# Patient Record
Sex: FEMALE | Race: WHITE | NOT HISPANIC OR LATINO | Employment: FULL TIME | URBAN - METROPOLITAN AREA
[De-identification: names, ages, dates, MRNs, and addresses within clinical notes are randomized per-mention and may not be internally consistent; named-entity substitution may affect disease eponyms.]

---

## 2018-02-24 PROBLEM — S82.842A CLOSED BIMALLEOLAR FRACTURE OF LEFT ANKLE: Status: ACTIVE | Noted: 2018-02-24

## 2018-02-25 PROBLEM — Q85.00 NEUROFIBROMATOSIS: Chronic | Status: ACTIVE | Noted: 2018-02-25

## 2018-02-25 PROBLEM — J45.909 ASTHMA: Chronic | Status: ACTIVE | Noted: 2018-02-25

## 2018-02-25 PROBLEM — S93.05XA ANKLE DISLOCATION, LEFT, INITIAL ENCOUNTER: Status: ACTIVE | Noted: 2018-02-25

## 2024-05-17 PROBLEM — B00.1: Status: ACTIVE | Noted: 2024-05-17

## 2024-05-17 PROBLEM — B00.50: Status: ACTIVE | Noted: 2024-05-17

## 2024-05-18 ENCOUNTER — HOSPITAL ENCOUNTER (INPATIENT)
Facility: HOSPITAL | Age: 69
LOS: 11 days | Discharge: NURSING, REHAB OR LONG-TERM CARE | DRG: 125 | End: 2024-05-29
Attending: STUDENT IN AN ORGANIZED HEALTH CARE EDUCATION/TRAINING PROGRAM | Admitting: HOSPITALIST
Payer: MEDICARE

## 2024-05-18 DIAGNOSIS — R07.9 CHEST PAIN: ICD-10-CM

## 2024-05-18 DIAGNOSIS — B02.30 HERPES ZOSTER OPHTHALMICUS OF LEFT EYE: ICD-10-CM

## 2024-05-18 DIAGNOSIS — E55.9 VITAMIN D DEFICIENCY: Primary | ICD-10-CM

## 2024-05-18 DIAGNOSIS — R78.81 BACTEREMIA: ICD-10-CM

## 2024-05-18 PROBLEM — F41.9 ANXIETY: Status: ACTIVE | Noted: 2024-05-18

## 2024-05-18 LAB
ALBUMIN SERPL BCP-MCNC: 3.1 G/DL (ref 3.5–5.2)
ALP SERPL-CCNC: 92 U/L (ref 55–135)
ALT SERPL W/O P-5'-P-CCNC: 7 U/L (ref 10–44)
ANION GAP SERPL CALC-SCNC: 12 MMOL/L (ref 8–16)
AST SERPL-CCNC: 12 U/L (ref 10–40)
BASOPHILS # BLD AUTO: 0.06 K/UL (ref 0–0.2)
BASOPHILS NFR BLD: 0.6 % (ref 0–1.9)
BILIRUB SERPL-MCNC: 0.5 MG/DL (ref 0.1–1)
BUN SERPL-MCNC: 11 MG/DL (ref 8–23)
CALCIUM SERPL-MCNC: 9.5 MG/DL (ref 8.7–10.5)
CHLORIDE SERPL-SCNC: 100 MMOL/L (ref 95–110)
CO2 SERPL-SCNC: 23 MMOL/L (ref 23–29)
CREAT SERPL-MCNC: 0.7 MG/DL (ref 0.5–1.4)
DIFFERENTIAL METHOD BLD: ABNORMAL
EOSINOPHIL # BLD AUTO: 0.1 K/UL (ref 0–0.5)
EOSINOPHIL NFR BLD: 0.6 % (ref 0–8)
ERYTHROCYTE [DISTWIDTH] IN BLOOD BY AUTOMATED COUNT: 14.3 % (ref 11.5–14.5)
EST. GFR  (NO RACE VARIABLE): >60 ML/MIN/1.73 M^2
GLUCOSE SERPL-MCNC: 104 MG/DL (ref 70–110)
HCT VFR BLD AUTO: 38.1 % (ref 37–48.5)
HGB BLD-MCNC: 12.4 G/DL (ref 12–16)
IMM GRANULOCYTES # BLD AUTO: 0.06 K/UL (ref 0–0.04)
IMM GRANULOCYTES NFR BLD AUTO: 0.6 % (ref 0–0.5)
LYMPHOCYTES # BLD AUTO: 0.8 K/UL (ref 1–4.8)
LYMPHOCYTES NFR BLD: 7 % (ref 18–48)
MAGNESIUM SERPL-MCNC: 1.9 MG/DL (ref 1.6–2.6)
MCH RBC QN AUTO: 29.3 PG (ref 27–31)
MCHC RBC AUTO-ENTMCNC: 32.5 G/DL (ref 32–36)
MCV RBC AUTO: 90 FL (ref 82–98)
MONOCYTES # BLD AUTO: 1.2 K/UL (ref 0.3–1)
MONOCYTES NFR BLD: 11.5 % (ref 4–15)
NEUTROPHILS # BLD AUTO: 8.6 K/UL (ref 1.8–7.7)
NEUTROPHILS NFR BLD: 79.7 % (ref 38–73)
NRBC BLD-RTO: 0 /100 WBC
PHOSPHATE SERPL-MCNC: 3 MG/DL (ref 2.7–4.5)
PLATELET # BLD AUTO: 320 K/UL (ref 150–450)
PMV BLD AUTO: 10.7 FL (ref 9.2–12.9)
POTASSIUM SERPL-SCNC: 3.7 MMOL/L (ref 3.5–5.1)
PROT SERPL-MCNC: 6.4 G/DL (ref 6–8.4)
RBC # BLD AUTO: 4.23 M/UL (ref 4–5.4)
SODIUM SERPL-SCNC: 135 MMOL/L (ref 136–145)
WBC # BLD AUTO: 10.79 K/UL (ref 3.9–12.7)

## 2024-05-18 PROCEDURE — 25000003 PHARM REV CODE 250

## 2024-05-18 PROCEDURE — 84100 ASSAY OF PHOSPHORUS: CPT | Performed by: STUDENT IN AN ORGANIZED HEALTH CARE EDUCATION/TRAINING PROGRAM

## 2024-05-18 PROCEDURE — 63600175 PHARM REV CODE 636 W HCPCS

## 2024-05-18 PROCEDURE — 80053 COMPREHEN METABOLIC PANEL: CPT | Mod: 91 | Performed by: STUDENT IN AN ORGANIZED HEALTH CARE EDUCATION/TRAINING PROGRAM

## 2024-05-18 PROCEDURE — 63600175 PHARM REV CODE 636 W HCPCS: Performed by: STUDENT IN AN ORGANIZED HEALTH CARE EDUCATION/TRAINING PROGRAM

## 2024-05-18 PROCEDURE — 25000003 PHARM REV CODE 250: Performed by: STUDENT IN AN ORGANIZED HEALTH CARE EDUCATION/TRAINING PROGRAM

## 2024-05-18 PROCEDURE — 36415 COLL VENOUS BLD VENIPUNCTURE: CPT | Performed by: STUDENT IN AN ORGANIZED HEALTH CARE EDUCATION/TRAINING PROGRAM

## 2024-05-18 PROCEDURE — 27000207 HC ISOLATION

## 2024-05-18 PROCEDURE — 11000001 HC ACUTE MED/SURG PRIVATE ROOM

## 2024-05-18 PROCEDURE — 85025 COMPLETE CBC W/AUTO DIFF WBC: CPT | Mod: 91 | Performed by: STUDENT IN AN ORGANIZED HEALTH CARE EDUCATION/TRAINING PROGRAM

## 2024-05-18 PROCEDURE — 83735 ASSAY OF MAGNESIUM: CPT | Mod: 91 | Performed by: STUDENT IN AN ORGANIZED HEALTH CARE EDUCATION/TRAINING PROGRAM

## 2024-05-18 RX ORDER — NALOXONE HCL 0.4 MG/ML
0.02 VIAL (ML) INJECTION
Status: DISCONTINUED | OUTPATIENT
Start: 2024-05-18 | End: 2024-05-29 | Stop reason: HOSPADM

## 2024-05-18 RX ORDER — IBUPROFEN 200 MG
24 TABLET ORAL
Status: DISCONTINUED | OUTPATIENT
Start: 2024-05-18 | End: 2024-05-29 | Stop reason: HOSPADM

## 2024-05-18 RX ORDER — CARBOXYMETHYLCELLULOSE SODIUM 10 MG/ML
1 GEL OPHTHALMIC 4 TIMES DAILY
Status: DISCONTINUED | OUTPATIENT
Start: 2024-05-18 | End: 2024-05-29 | Stop reason: HOSPADM

## 2024-05-18 RX ORDER — IBUPROFEN 200 MG
16 TABLET ORAL
Status: DISCONTINUED | OUTPATIENT
Start: 2024-05-18 | End: 2024-05-29 | Stop reason: HOSPADM

## 2024-05-18 RX ORDER — MORPHINE SULFATE 4 MG/ML
3 INJECTION, SOLUTION INTRAMUSCULAR; INTRAVENOUS ONCE
Status: COMPLETED | OUTPATIENT
Start: 2024-05-18 | End: 2024-05-18

## 2024-05-18 RX ORDER — GABAPENTIN 100 MG/1
100 CAPSULE ORAL 3 TIMES DAILY
Status: DISCONTINUED | OUTPATIENT
Start: 2024-05-18 | End: 2024-05-20

## 2024-05-18 RX ORDER — ATROPINE SULFATE 10 MG/ML
1 SOLUTION/ DROPS OPHTHALMIC DAILY
Status: DISCONTINUED | OUTPATIENT
Start: 2024-05-19 | End: 2024-05-29 | Stop reason: HOSPADM

## 2024-05-18 RX ORDER — ERYTHROMYCIN 5 MG/G
OINTMENT OPHTHALMIC 4 TIMES DAILY
Status: DISCONTINUED | OUTPATIENT
Start: 2024-05-18 | End: 2024-05-29 | Stop reason: HOSPADM

## 2024-05-18 RX ORDER — ONDANSETRON HYDROCHLORIDE 2 MG/ML
4 INJECTION, SOLUTION INTRAVENOUS EVERY 6 HOURS PRN
Status: DISCONTINUED | OUTPATIENT
Start: 2024-05-18 | End: 2024-05-29 | Stop reason: HOSPADM

## 2024-05-18 RX ORDER — ACETAMINOPHEN 325 MG/1
650 TABLET ORAL EVERY 6 HOURS PRN
Status: DISCONTINUED | OUTPATIENT
Start: 2024-05-18 | End: 2024-05-29 | Stop reason: HOSPADM

## 2024-05-18 RX ORDER — LORAZEPAM 0.5 MG/1
1 TABLET ORAL NIGHTLY PRN
Status: DISCONTINUED | OUTPATIENT
Start: 2024-05-18 | End: 2024-05-29 | Stop reason: HOSPADM

## 2024-05-18 RX ORDER — PREDNISOLONE ACETATE 10 MG/ML
1 SUSPENSION/ DROPS OPHTHALMIC 2 TIMES DAILY
Status: DISCONTINUED | OUTPATIENT
Start: 2024-05-18 | End: 2024-05-22

## 2024-05-18 RX ORDER — FLUTICASONE FUROATE AND VILANTEROL 100; 25 UG/1; UG/1
1 POWDER RESPIRATORY (INHALATION) DAILY
Status: DISCONTINUED | OUTPATIENT
Start: 2024-05-19 | End: 2024-05-29 | Stop reason: HOSPADM

## 2024-05-18 RX ORDER — SODIUM CHLORIDE 0.9 % (FLUSH) 0.9 %
10 SYRINGE (ML) INJECTION EVERY 12 HOURS PRN
Status: DISCONTINUED | OUTPATIENT
Start: 2024-05-18 | End: 2024-05-29 | Stop reason: HOSPADM

## 2024-05-18 RX ORDER — GLUCAGON 1 MG
1 KIT INJECTION
Status: DISCONTINUED | OUTPATIENT
Start: 2024-05-18 | End: 2024-05-29 | Stop reason: HOSPADM

## 2024-05-18 RX ORDER — OXYCODONE HYDROCHLORIDE 5 MG/1
5 TABLET ORAL EVERY 4 HOURS PRN
Status: DISCONTINUED | OUTPATIENT
Start: 2024-05-18 | End: 2024-05-29 | Stop reason: HOSPADM

## 2024-05-18 RX ADMIN — CARBOXYMETHYLCELLULOSE SODIUM 1 DROP: 10 GEL OPHTHALMIC at 06:05

## 2024-05-18 RX ADMIN — CARBOXYMETHYLCELLULOSE SODIUM 1 DROP: 10 GEL OPHTHALMIC at 08:05

## 2024-05-18 RX ADMIN — LORAZEPAM 1 MG: 0.5 TABLET ORAL at 08:05

## 2024-05-18 RX ADMIN — OXYCODONE 5 MG: 5 TABLET ORAL at 03:05

## 2024-05-18 RX ADMIN — ONDANSETRON 4 MG: 2 INJECTION INTRAMUSCULAR; INTRAVENOUS at 06:05

## 2024-05-18 RX ADMIN — ACYCLOVIR SODIUM 480 MG: 50 INJECTION, SOLUTION INTRAVENOUS at 04:05

## 2024-05-18 RX ADMIN — ERYTHROMYCIN: 5 OINTMENT OPHTHALMIC at 08:05

## 2024-05-18 RX ADMIN — GABAPENTIN 100 MG: 100 CAPSULE ORAL at 08:05

## 2024-05-18 RX ADMIN — MORPHINE SULFATE 3 MG: 4 INJECTION INTRAVENOUS at 04:05

## 2024-05-18 RX ADMIN — OXYCODONE 5 MG: 5 TABLET ORAL at 08:05

## 2024-05-18 RX ADMIN — ACETAMINOPHEN 650 MG: 325 TABLET ORAL at 08:05

## 2024-05-18 RX ADMIN — ERYTHROMYCIN: 5 OINTMENT OPHTHALMIC at 06:05

## 2024-05-18 NOTE — H&P
[facility] Surg ([address] Medicine  History & Physical    Patient Name: Jessi Dial  MRN: [id number]  Patient Class: IP- Inpatient  Admission Date: 5/18/2024  Attending Physician: Ed Jolly MD   Primary Care Provider: Oscar Shafer MD         Patient information was obtained from patient, past medical records, and ER records.     Subjective:     Principal Problem:Herpes zoster ophthalmicus of left eye    Chief Complaint: No chief complaint on file.       HPI: Jessi Dial onesimo  67 yo W w/ PMH of asthma, COPD, seizures, and neurofibromatosis who presented to [facility] ED for persistent pain tot he left side of her face and was transferred to [facility] for inpatient ophthalmology evaluation. She reports that about 5 weeks ago she started having pain b/l below her breasts that later developed into an itchy and painful foul-smelling rash. This has been improving. She presented initially to the ED on 04/23 for L-sided headache and ear pain with N/V and was treated and discharged. She then presented to [facility] ED on 04/26 for a rash with an associated burning sensation that covered the L forehead and upper eyelid. The ED provider noted that she had no Pemberton sign and fluorescein staining of the L eye showed no dendritic lesions. She was discharged with valacyclovir and prednisolone eye drops but presented to the ED again yesterday as the pain had continued to persist despite the medications. She was followed outpatient by her PCP and an ophthalmologist. The vesicular lesions have begun to crust over but she is continuing to have significant pain over her L eye and struggles to keep it open. She reports that she was started on gabapentin outpatient for additional pain control and that it initially made her drowsy and confused but that those symptoms resolved as she continued taking it. Reports vision changes due to eye discharge and pain. Denies fever, chills, hearing  changes, abdominal pain, shortness of breath, cough, chest pain.    In the OSH ED, she was hypertensive and tachycardic, afebrile. Labs notable for leukocytosis to 13.11 and Na 134. CTH without evidence of acute intracranial abnormality but notable for numerous scalp neurofibromas. Received a dose of IV acyclovir. Transferred here for further evaluation and management.     Past Medical History:   Diagnosis Date    Asthma     COPD (chronic obstructive pulmonary disease)     Neurofibromatosis     Seizures        No past surgical history on file.    Review of patient's allergies indicates:   Allergen Reactions    Avelox [moxifloxacin] Swelling       Current Facility-Administered Medications on File Prior to Encounter   Medication    [COMPLETED] acyclovir 500 mg in dextrose 5 % (D5W) 100 mL IVPB    [COMPLETED] morphine injection 4 mg    [COMPLETED] morphine injection 4 mg    [COMPLETED] ondansetron injection 4 mg    [COMPLETED] ondansetron injection 4 mg    [DISCONTINUED] 0.9%  NaCl infusion    [DISCONTINUED] acetaminophen tablet 650 mg    [DISCONTINUED] acyclovir 500 mg in dextrose 5 % (D5W) 100 mL IVPB    [DISCONTINUED] morphine injection 4 mg    [DISCONTINUED] ondansetron injection 4 mg    [DISCONTINUED] prochlorperazine injection Soln 5 mg    [DISCONTINUED] senna-docusate 8.6-50 mg per tablet 1 tablet     Current Outpatient Medications on File Prior to Encounter   Medication Sig    acetaminophen (TYLENOL) 325 MG tablet Take 2 tablets (650 mg total) by mouth every 4 (four) hours as needed.    albuterol 90 mcg/actuation inhaler Inhale 1-2 puffs into the lungs every 6 (six) hours as needed for Wheezing. Rescue    aspirin 325 MG tablet Take 1 tablet (325 mg total) by mouth 2 (two) times daily.    budesonide-formoterol 80-4.5 mcg (SYMBICORT) 80-4.5 mcg/actuation HFAA Inhale 2 puffs into the lungs 2 (two) times daily. Controller    butalbital-acetaminophen-caffeine -40 mg (FIORICET, ESGIC) -40 mg per tablet  Take 1 tablet by mouth every 4 (four) hours as needed for Pain.    fluticasone propionate (FLONASE) 50 mcg/actuation nasal spray 1 spray (50 mcg total) by Each Nostril route 2 (two) times daily as needed for Rhinitis.    levocetirizine (XYZAL) 5 MG tablet Take 1 tablet (5 mg total) by mouth every evening.    LORazepam (ATIVAN) 1 MG tablet Take 1 mg by mouth nightly as needed for Anxiety.    naproxen (NAPROSYN) 500 MG tablet Take 1 tablet (500 mg total) by mouth 2 (two) times daily with meals.    oxyCODONE (ROXICODONE) 5 MG immediate release tablet Take 1 tablet (5 mg total) by mouth every 4 (four) hours as needed for Pain.    senna-docusate 8.6-50 mg (PERICOLACE) 8.6-50 mg per tablet Take 1 tablet by mouth 2 (two) times daily.    valACYclovir (VALTREX) 1000 MG tablet Take 1 tablet (1,000 mg total) by mouth 3 (three) times daily. for 10 days    [DISCONTINUED] azithromycin (Z-VIVIENNE) 250 MG tablet Take 1 tablet (250 mg total) by mouth once daily. Take first 2 tablets together, then 1 every day until finished.     Family History    None       Tobacco Use    Smoking status: Never    Smokeless tobacco: Never   Substance and Sexual Activity    Alcohol use: No    Drug use: No    Sexual activity: Not on file     Review of Systems   Constitutional:  Negative for chills and fever.   HENT:  Negative for ear discharge and ear pain.    Eyes:  Positive for photophobia, pain, discharge and visual disturbance.   Respiratory:  Negative for cough and shortness of breath.    Cardiovascular:  Negative for chest pain.   Gastrointestinal:  Positive for nausea and vomiting. Negative for abdominal pain.   Musculoskeletal:  Negative for arthralgias and myalgias.   Skin:  Positive for rash.   Neurological:  Positive for dizziness and light-headedness.     Objective:     Vital Signs (Most Recent):  Temp: 98.3 °F (36.8 °C) (05/18/24 1419)  Pulse: 84 (05/18/24 1419)  Resp: 19 (05/18/24 1615)  BP: (!) 166/79 (05/18/24 1419)  SpO2: 98 % (05/18/24  1419) Vital Signs (24h Range):  Temp:  [97.9 °F (36.6 °C)-98.3 °F (36.8 °C)] 98.3 °F (36.8 °C)  Pulse:  [84-91] 84  Resp:  [18-20] 19  SpO2:  [96 %-98 %] 98 %  BP: (119-166)/(61-79) 166/79     Weight: 68.9 kg (151 lb 14.4 oz)  Body mass index is 28.7 kg/m².     Physical Exam  Vitals and nursing note reviewed.   Constitutional:       General: She is not in acute distress.     Appearance: She is not ill-appearing.   HENT:      Head: Normocephalic and atraumatic.   Eyes:      Extraocular Movements: Extraocular movements intact.      Comments: Crusted vesicular lesions along the L V1 dermatome with lesions extending onto the L eyelid, neurofibromas present on eyelids   Cardiovascular:      Rate and Rhythm: Normal rate and regular rhythm.      Heart sounds: Normal heart sounds.   Pulmonary:      Effort: Pulmonary effort is normal. No respiratory distress.      Breath sounds: Normal breath sounds.   Musculoskeletal:      Right lower leg: No edema.      Left lower leg: No edema.   Skin:     General: Skin is warm and dry.      Comments:   Diffuse neurofibromas  Erythematous skins and healing lesions below both breasts, no obvious vesicular lesions   Neurological:      General: No focal deficit present.      Mental Status: She is alert and oriented to person, place, and time.   Psychiatric:         Mood and Affect: Mood normal.         Behavior: Behavior normal.                Significant Labs: All pertinent labs within the past 24 hours have been reviewed.    Significant Imaging: I have reviewed all pertinent imaging results/findings within the past 24 hours.  Assessment/Plan:     * Herpes zoster ophthalmicus of left eye  3-4 week history of vesicular lesions and pain extending along the L V1 dermatome including the L eyelid. She reports L eye discharge and pain that impacts her vision. She was seen in the ED on 04/26 for this and followed outpatient by her PCP and ophthalmologist. Was taking Valtrex and using steroid eye  drops, but the pain and rash persisted. Transferred here for inpatient ophthalmology evaluation. Vision impaired by discharge and presence of rash and neurofibromas on L eyelid, but otherwise no vision loss.     - Ophthalmology consulted, appreciate recommendations  - IV acyclovir 10mg/kg q8h  - Gabapentin 100mg TID, Tylenol and oxy 5mg prn for pain    Anxiety  Continuing home ativan      Neurofibromatosis  Hx of neurofibromatosis      Asthma  Continue home inhaler        VTE Risk Mitigation (From admission, onward)           Ordered     IP VTE LOW RISK PATIENT  Once         05/18/24 1505     Place sequential compression device  Until discontinued         05/18/24 1505                                    Duyen Joy MD  Department of Hospital Medicine  [facility] - Med Surg ([id number])

## 2024-05-18 NOTE — CONSULTS
Reason for consult: Herpes zoster of L V1 dermatome     CC: blurry vision, new floaters intermittently for 1 month    HPI: Jessi Dial is a 68 y.o. female who has been having left sided facial rash and light sensitivity for 4 weeks. She has been treated as an outpatient by her PCP and ophthalmologist. She was previously on steroid drops and was taken off of them, but presented to her ophthalmologist yesterday 05/17 and was told to restart steroid drops BID. She also stated that she did have lesions on her eye that went away (per her eye doctor). She also states she was taking valtrex which had been decreased to BID from TID. She endorses difficulty opening eyelids but denies gross visual changes when eyes are open. Endorsing light sensitivity and scratchiness.       POH: CEIOL OU, plus above     Gtts: most recently pred forte BID, left eye      Past Medical History:   Diagnosis Date    Asthma     COPD (chronic obstructive pulmonary disease)     Neurofibromatosis     Seizures          No family history on file.        Current Facility-Administered Medications:     acetaminophen tablet 650 mg, 650 mg, Oral, Q6H PRN, Duyen Joy MD    acyclovir 480 mg in dextrose 5 % (D5W) 100 mL IVPB, 10 mg/kg (Ideal), Intravenous, Q8H, Duyen Joy MD, Last Rate: 100 mL/hr at 05/18/24 1610, 480 mg at 05/18/24 1610    dextrose 10% bolus 125 mL 125 mL, 12.5 g, Intravenous, PRN, Duyen Joy MD    dextrose 10% bolus 250 mL 250 mL, 25 g, Intravenous, PRN, Duyen Joy MD    [START ON 5/19/2024] fluticasone furoate-vilanteroL 100-25 mcg/dose diskus inhaler 1 puff, 1 puff, Inhalation, Daily, Aviva Alvarez MD    gabapentin capsule 100 mg, 100 mg, Oral, TID, Duyen Joy MD    glucagon (human recombinant) injection 1 mg, 1 mg, Intramuscular, PRN, Duyen Joy MD    glucose chewable tablet 16 g, 16 g, Oral, PRN, Duyen Joy MD    glucose chewable tablet 24 g, 24 g, Oral, PRN, Duyen Joy MD    LORazepam tablet 1 mg, 1 mg,  Oral, Nightly PRN, Duyen Joy MD    naloxone 0.4 mg/mL injection 0.02 mg, 0.02 mg, Intravenous, PRN, Duyen Joy MD    ondansetron injection 4 mg, 4 mg, Intravenous, Q6H PRN, Duyen Joy MD    oxyCODONE immediate release tablet 5 mg, 5 mg, Oral, Q4H PRN, Duyen Joy MD, 5 mg at 05/18/24 1536    sodium chloride 0.9% flush 10 mL, 10 mL, Intravenous, Q12H PRN, Duyen Joy MD      Review of patient's allergies indicates:   Allergen Reactions    Avelox [moxifloxacin] Swelling         Social History     Tobacco Use    Smoking status: Never    Smokeless tobacco: Never   Substance Use Topics    Alcohol use: No    Drug use: No         Base Eye Exam       Visual Acuity (Snellen - Linear)         Right Left    Dist sc 20/30 20/30              Tonometry (Tonopen, 4:10 PM)         Right Left    Pressure 15 19              Pupils         Dark Light Shape React APD    Right 2 2 Round Minimal Difficult to assess    Left 3 3 Round Minimal Difficult to assess              Visual Fields         Right Left     Full Full              Extraocular Movement         Right Left     Full Full              Dilation       Both eyes: 1% Mydriacyl, 2.5% Phenylephrine @ 4:24 PM                  Slit Lamp and Fundus Exam       External Exam         Right Left    External neurofibromas neurofibromas, vesicular scaly rash superiorly              Slit Lamp Exam         Right Left    Lids/Lashes neurofibromas erythema, upper lid thickening    Conjunctiva/Sclera White and quiet White and quiet    Cornea Clear dense inferior PEE's, no carrington dendrite/pseudodendrite, few inferior KP's    Anterior Chamber Deep and quiet deep, trace white cell    Iris Round and minimally reactive Round and minimally reactive    Lens IOL IOL    Anterior Vitreous PVD PVD              Fundus Exam         Right Left    Disc Pink and sharp Pink and sharp    C/D Ratio 0.2 0.2    Macula Flat and attached, pigment changes Flat and attached, pigment changes    Vessels  "Normal caliber and crossings Normal caliber and crossings    Periphery Flat with no holes, tears, or detachments Flat with no holes, tears, or detachments                      Plan   A/P: Jessi Dial is a 68 y.o. female    Herpes zoster ophthalmicus, left   HZO Uveitis, left   - complaining severe pain to left side of forehead, light sensitivity, floaters, no carrington vision changes. Has been treated for 4 weeks, and per patient symptoms have improved . Per patient, ophthalmoloigst noted lesions on eye that have since resolved. Was most recently on pred forte BID  - external exam with numerous vesicular lesions superior to left eye, no obvious lesions on eyelid, negative varghese's sign  - on exam, VA 20/30, IOP good, pupil fixed at approx 3 mm, AC with trace cell, K with few inferior KP's and diffuse PEE's. No carrington dendrite/pseudodendrites.   - dilated exam without evidence of retinal necrosis (PVD present OU, which could explain floaters)  -will start drops as below, can increase steroid frequency if inflammation does not improve     Recs  - Agree with IV acyclovir 10mg/kg q8hr and ID consult  - start Pred forte BID left eye   - Start Atropine daily left eye  - erythromycin ointment QID left eye and to periocular skin lesions  - Preservative free artificial tears to both eyes QID  - warm compresses to periocular lesions TID   - please space out drops by 5 minutes to ensure adequate absorption. If applying at the same time as ointment, please apply drops prior to ointment.        Ophthalmology will continue to follow while inpatient.      Adolfo Barth MD (Brad)  [employer] Ophthalmology PGY-2     "

## 2024-05-18 NOTE — HPI
Jessi Dial onesimo  67 yo W w/ PMH of asthma, COPD, seizures, and neurofibromatosis who presented to [facility] ED for persistent pain tot he left side of her face and was transferred to [facility] for inpatient ophthalmology evaluation. She reports that about 5 weeks ago she started having pain b/l below her breasts that later developed into an itchy and painful foul-smelling rash. This has been improving. She presented initially to the ED on 04/23 for L-sided headache and ear pain with N/V and was treated and discharged. She then presented to [facility] ED on 04/26 for a rash with an associated burning sensation that covered the L forehead and upper eyelid. The ED provider noted that she had no Pemberton sign and fluorescein staining of the L eye showed no dendritic lesions. She was discharged with valacyclovir and prednisolone eye drops but presented to the ED again yesterday as the pain had continued to persist despite the medications. She was followed outpatient by her PCP and an ophthalmologist. The vesicular lesions have begun to crust over but she is continuing to have significant pain over her L eye and struggles to keep it open. She reports that she was started on gabapentin outpatient for additional pain control and that it initially made her drowsy and confused but that those symptoms resolved as she continued taking it. Reports vision changes due to eye discharge and pain. Denies fever, chills, hearing changes, abdominal pain, shortness of breath, cough, chest pain.    In the OSH ED, she was hypertensive and tachycardic, afebrile. Labs notable for leukocytosis to 13.11 and Na 134. CTH without evidence of acute intracranial abnormality but notable for numerous scalp neurofibromas. Received a dose of IV acyclovir. Transferred here for further evaluation and management.

## 2024-05-18 NOTE — ASSESSMENT & PLAN NOTE
3-4 week history of vesicular lesions and pain extending along the L V1 dermatome including the L eyelid. She reports L eye discharge and pain that impacts her vision. She was seen in the ED on 04/26 for this and followed outpatient by her PCP and ophthalmologist. Was taking Valtrex and using steroid eye drops, but the pain and rash persisted. Transferred here for inpatient ophthalmology evaluation. Vision impaired by discharge and presence of rash and neurofibromas on L eyelid, but otherwise no vision loss.     - Ophthalmology consulted, appreciate recommendations  - IV acyclovir 10mg/kg q8h  - Gabapentin 100mg TID, Tylenol and oxy 5mg prn for pain

## 2024-05-18 NOTE — SUBJECTIVE & OBJECTIVE
Past Medical History:   Diagnosis Date    Asthma     COPD (chronic obstructive pulmonary disease)     Neurofibromatosis     Seizures        No past surgical history on file.    Review of patient's allergies indicates:   Allergen Reactions    Avelox [moxifloxacin] Swelling       Current Facility-Administered Medications on File Prior to Encounter   Medication    [COMPLETED] acyclovir 500 mg in dextrose 5 % (D5W) 100 mL IVPB    [COMPLETED] morphine injection 4 mg    [COMPLETED] morphine injection 4 mg    [COMPLETED] ondansetron injection 4 mg    [COMPLETED] ondansetron injection 4 mg    [DISCONTINUED] 0.9%  NaCl infusion    [DISCONTINUED] acetaminophen tablet 650 mg    [DISCONTINUED] acyclovir 500 mg in dextrose 5 % (D5W) 100 mL IVPB    [DISCONTINUED] morphine injection 4 mg    [DISCONTINUED] ondansetron injection 4 mg    [DISCONTINUED] prochlorperazine injection Soln 5 mg    [DISCONTINUED] senna-docusate 8.6-50 mg per tablet 1 tablet     Current Outpatient Medications on File Prior to Encounter   Medication Sig    acetaminophen (TYLENOL) 325 MG tablet Take 2 tablets (650 mg total) by mouth every 4 (four) hours as needed.    albuterol 90 mcg/actuation inhaler Inhale 1-2 puffs into the lungs every 6 (six) hours as needed for Wheezing. Rescue    aspirin 325 MG tablet Take 1 tablet (325 mg total) by mouth 2 (two) times daily.    budesonide-formoterol 80-4.5 mcg (SYMBICORT) 80-4.5 mcg/actuation HFAA Inhale 2 puffs into the lungs 2 (two) times daily. Controller    butalbital-acetaminophen-caffeine -40 mg (FIORICET, ESGIC) -40 mg per tablet Take 1 tablet by mouth every 4 (four) hours as needed for Pain.    fluticasone propionate (FLONASE) 50 mcg/actuation nasal spray 1 spray (50 mcg total) by Each Nostril route 2 (two) times daily as needed for Rhinitis.    levocetirizine (XYZAL) 5 MG tablet Take 1 tablet (5 mg total) by mouth every evening.    LORazepam (ATIVAN) 1 MG tablet Take 1 mg by mouth nightly as needed  for Anxiety.    naproxen (NAPROSYN) 500 MG tablet Take 1 tablet (500 mg total) by mouth 2 (two) times daily with meals.    oxyCODONE (ROXICODONE) 5 MG immediate release tablet Take 1 tablet (5 mg total) by mouth every 4 (four) hours as needed for Pain.    senna-docusate 8.6-50 mg (PERICOLACE) 8.6-50 mg per tablet Take 1 tablet by mouth 2 (two) times daily.    valACYclovir (VALTREX) 1000 MG tablet Take 1 tablet (1,000 mg total) by mouth 3 (three) times daily. for 10 days    [DISCONTINUED] azithromycin (Z-VIVIENNE) 250 MG tablet Take 1 tablet (250 mg total) by mouth once daily. Take first 2 tablets together, then 1 every day until finished.     Family History    None       Tobacco Use    Smoking status: Never    Smokeless tobacco: Never   Substance and Sexual Activity    Alcohol use: No    Drug use: No    Sexual activity: Not on file     Review of Systems   Constitutional:  Negative for chills and fever.   HENT:  Negative for ear discharge and ear pain.    Eyes:  Positive for photophobia, pain, discharge and visual disturbance.   Respiratory:  Negative for cough and shortness of breath.    Cardiovascular:  Negative for chest pain.   Gastrointestinal:  Positive for nausea and vomiting. Negative for abdominal pain.   Musculoskeletal:  Negative for arthralgias and myalgias.   Skin:  Positive for rash.   Neurological:  Positive for dizziness and light-headedness.     Objective:     Vital Signs (Most Recent):  Temp: 98.3 °F (36.8 °C) (05/18/24 1419)  Pulse: 84 (05/18/24 1419)  Resp: 19 (05/18/24 1615)  BP: (!) 166/79 (05/18/24 1419)  SpO2: 98 % (05/18/24 1419) Vital Signs (24h Range):  Temp:  [97.9 °F (36.6 °C)-98.3 °F (36.8 °C)] 98.3 °F (36.8 °C)  Pulse:  [84-91] 84  Resp:  [18-20] 19  SpO2:  [96 %-98 %] 98 %  BP: (119-166)/(61-79) 166/79     Weight: 68.9 kg (151 lb 14.4 oz)  Body mass index is 28.7 kg/m².     Physical Exam  Vitals and nursing note reviewed.   Constitutional:       General: She is not in acute distress.      Appearance: She is not ill-appearing.   HENT:      Head: Normocephalic and atraumatic.   Eyes:      Extraocular Movements: Extraocular movements intact.      Comments: Crusted vesicular lesions along the L V1 dermatome with lesions extending onto the L eyelid, neurofibromas present on eyelids   Cardiovascular:      Rate and Rhythm: Normal rate and regular rhythm.      Heart sounds: Normal heart sounds.   Pulmonary:      Effort: Pulmonary effort is normal. No respiratory distress.      Breath sounds: Normal breath sounds.   Musculoskeletal:      Right lower leg: No edema.      Left lower leg: No edema.   Skin:     General: Skin is warm and dry.      Comments:   Diffuse neurofibromas  Erythematous skins and healing lesions below both breasts, no obvious vesicular lesions   Neurological:      General: No focal deficit present.      Mental Status: She is alert and oriented to person, place, and time.   Psychiatric:         Mood and Affect: Mood normal.         Behavior: Behavior normal.                Significant Labs: All pertinent labs within the past 24 hours have been reviewed.    Significant Imaging: I have reviewed all pertinent imaging results/findings within the past 24 hours.

## 2024-05-19 LAB
ALBUMIN SERPL BCP-MCNC: 2.9 G/DL (ref 3.5–5.2)
ALP SERPL-CCNC: 89 U/L (ref 55–135)
ALT SERPL W/O P-5'-P-CCNC: 7 U/L (ref 10–44)
ANION GAP SERPL CALC-SCNC: 10 MMOL/L (ref 8–16)
AST SERPL-CCNC: 11 U/L (ref 10–40)
BASOPHILS # BLD AUTO: 0.05 K/UL (ref 0–0.2)
BASOPHILS NFR BLD: 0.6 % (ref 0–1.9)
BILIRUB SERPL-MCNC: 0.5 MG/DL (ref 0.1–1)
BUN SERPL-MCNC: 11 MG/DL (ref 8–23)
CALCIUM SERPL-MCNC: 9.3 MG/DL (ref 8.7–10.5)
CHLORIDE SERPL-SCNC: 100 MMOL/L (ref 95–110)
CO2 SERPL-SCNC: 22 MMOL/L (ref 23–29)
CREAT SERPL-MCNC: 0.6 MG/DL (ref 0.5–1.4)
DIFFERENTIAL METHOD BLD: ABNORMAL
EOSINOPHIL # BLD AUTO: 0.1 K/UL (ref 0–0.5)
EOSINOPHIL NFR BLD: 1.5 % (ref 0–8)
ERYTHROCYTE [DISTWIDTH] IN BLOOD BY AUTOMATED COUNT: 14.4 % (ref 11.5–14.5)
EST. GFR  (NO RACE VARIABLE): >60 ML/MIN/1.73 M^2
GLUCOSE SERPL-MCNC: 85 MG/DL (ref 70–110)
HCT VFR BLD AUTO: 35.8 % (ref 37–48.5)
HGB BLD-MCNC: 11.6 G/DL (ref 12–16)
IMM GRANULOCYTES # BLD AUTO: 0.06 K/UL (ref 0–0.04)
IMM GRANULOCYTES NFR BLD AUTO: 0.7 % (ref 0–0.5)
LYMPHOCYTES # BLD AUTO: 0.8 K/UL (ref 1–4.8)
LYMPHOCYTES NFR BLD: 9.8 % (ref 18–48)
MAGNESIUM SERPL-MCNC: 1.9 MG/DL (ref 1.6–2.6)
MCH RBC QN AUTO: 29.1 PG (ref 27–31)
MCHC RBC AUTO-ENTMCNC: 32.4 G/DL (ref 32–36)
MCV RBC AUTO: 90 FL (ref 82–98)
MONOCYTES # BLD AUTO: 1 K/UL (ref 0.3–1)
MONOCYTES NFR BLD: 12.3 % (ref 4–15)
NEUTROPHILS # BLD AUTO: 6 K/UL (ref 1.8–7.7)
NEUTROPHILS NFR BLD: 75.1 % (ref 38–73)
NRBC BLD-RTO: 0 /100 WBC
PHOSPHATE SERPL-MCNC: 3 MG/DL (ref 2.7–4.5)
PLATELET # BLD AUTO: 300 K/UL (ref 150–450)
PMV BLD AUTO: 10.7 FL (ref 9.2–12.9)
POTASSIUM SERPL-SCNC: 3.5 MMOL/L (ref 3.5–5.1)
PROT SERPL-MCNC: 6.1 G/DL (ref 6–8.4)
RBC # BLD AUTO: 3.98 M/UL (ref 4–5.4)
SODIUM SERPL-SCNC: 132 MMOL/L (ref 136–145)
WBC # BLD AUTO: 8.03 K/UL (ref 3.9–12.7)

## 2024-05-19 PROCEDURE — 25000003 PHARM REV CODE 250: Performed by: STUDENT IN AN ORGANIZED HEALTH CARE EDUCATION/TRAINING PROGRAM

## 2024-05-19 PROCEDURE — 25000242 PHARM REV CODE 250 ALT 637 W/ HCPCS

## 2024-05-19 PROCEDURE — 99223 1ST HOSP IP/OBS HIGH 75: CPT | Mod: ,,, | Performed by: INTERNAL MEDICINE

## 2024-05-19 PROCEDURE — 25000003 PHARM REV CODE 250

## 2024-05-19 PROCEDURE — 83735 ASSAY OF MAGNESIUM: CPT | Performed by: STUDENT IN AN ORGANIZED HEALTH CARE EDUCATION/TRAINING PROGRAM

## 2024-05-19 PROCEDURE — 27000207 HC ISOLATION

## 2024-05-19 PROCEDURE — 80053 COMPREHEN METABOLIC PANEL: CPT | Performed by: STUDENT IN AN ORGANIZED HEALTH CARE EDUCATION/TRAINING PROGRAM

## 2024-05-19 PROCEDURE — 36415 COLL VENOUS BLD VENIPUNCTURE: CPT | Performed by: STUDENT IN AN ORGANIZED HEALTH CARE EDUCATION/TRAINING PROGRAM

## 2024-05-19 PROCEDURE — 85025 COMPLETE CBC W/AUTO DIFF WBC: CPT | Performed by: STUDENT IN AN ORGANIZED HEALTH CARE EDUCATION/TRAINING PROGRAM

## 2024-05-19 PROCEDURE — 11000001 HC ACUTE MED/SURG PRIVATE ROOM

## 2024-05-19 PROCEDURE — 63600175 PHARM REV CODE 636 W HCPCS: Performed by: STUDENT IN AN ORGANIZED HEALTH CARE EDUCATION/TRAINING PROGRAM

## 2024-05-19 PROCEDURE — 84100 ASSAY OF PHOSPHORUS: CPT | Performed by: STUDENT IN AN ORGANIZED HEALTH CARE EDUCATION/TRAINING PROGRAM

## 2024-05-19 RX ADMIN — SODIUM CHLORIDE 1000 ML: 9 INJECTION, SOLUTION INTRAVENOUS at 10:05

## 2024-05-19 RX ADMIN — GABAPENTIN 100 MG: 100 CAPSULE ORAL at 02:05

## 2024-05-19 RX ADMIN — ACYCLOVIR SODIUM 480 MG: 50 INJECTION, SOLUTION INTRAVENOUS at 03:05

## 2024-05-19 RX ADMIN — ERYTHROMYCIN: 5 OINTMENT OPHTHALMIC at 05:05

## 2024-05-19 RX ADMIN — ERYTHROMYCIN: 5 OINTMENT OPHTHALMIC at 08:05

## 2024-05-19 RX ADMIN — PREDNISOLONE ACETATE 1 DROP: 10 SUSPENSION/ DROPS OPHTHALMIC at 12:05

## 2024-05-19 RX ADMIN — FLUTICASONE FUROATE AND VILANTEROL TRIFENATATE 1 PUFF: 100; 25 POWDER RESPIRATORY (INHALATION) at 08:05

## 2024-05-19 RX ADMIN — GABAPENTIN 100 MG: 100 CAPSULE ORAL at 08:05

## 2024-05-19 RX ADMIN — CARBOXYMETHYLCELLULOSE SODIUM 1 DROP: 10 GEL OPHTHALMIC at 08:05

## 2024-05-19 RX ADMIN — ACYCLOVIR SODIUM 480 MG: 50 INJECTION, SOLUTION INTRAVENOUS at 08:05

## 2024-05-19 RX ADMIN — ERYTHROMYCIN: 5 OINTMENT OPHTHALMIC at 12:05

## 2024-05-19 RX ADMIN — OXYCODONE 5 MG: 5 TABLET ORAL at 06:05

## 2024-05-19 RX ADMIN — PREDNISOLONE ACETATE 1 DROP: 10 SUSPENSION/ DROPS OPHTHALMIC at 08:05

## 2024-05-19 RX ADMIN — ACETAMINOPHEN 650 MG: 325 TABLET ORAL at 08:05

## 2024-05-19 RX ADMIN — LORAZEPAM 1 MG: 0.5 TABLET ORAL at 08:05

## 2024-05-19 RX ADMIN — CARBOXYMETHYLCELLULOSE SODIUM 1 DROP: 10 GEL OPHTHALMIC at 02:05

## 2024-05-19 RX ADMIN — ACYCLOVIR SODIUM 480 MG: 50 INJECTION, SOLUTION INTRAVENOUS at 12:05

## 2024-05-19 RX ADMIN — CARBOXYMETHYLCELLULOSE SODIUM 1 DROP: 10 GEL OPHTHALMIC at 06:05

## 2024-05-19 RX ADMIN — ATROPINE SULFATE 1 DROP: 10 SOLUTION/ DROPS OPHTHALMIC at 10:05

## 2024-05-19 NOTE — CONSULTS
[facility] ([place] [id number])  Infectious Disease  Consult Note    Patient Name: Jessi Dial  MRN: [id number]  Admission Date: 5/18/2024  Hospital Length of Stay: 1 days  Attending Physician: Ed Jolly MD  Primary Care Provider: Oscar Shafer MD     Isolation Status: Contact and Airborne    Patient information was obtained from patient and ER records.      Inpatient consult to Infectious Diseases  Consult performed by: Joy Clemons MD  Consult ordered by: Duyen Joy MD        Assessment/Plan:     Ophtho  * Herpes zoster ophthalmicus of left eye  68F with h/o asthma, COPD, seizures, and neurofibromatosis admitted 5/18 as transfer from [facility] for optho exam. Around 4/26 had shingles over L eye and was started on high dose valtrex 1gm tid x 10 days. Lesions on face have all crusted over, but she's having ongoing issues with pain and opening L eye without physically holding it open with hands    Shingles appears limited to V1 dermatome and not disseminated (no signs of shingles under breast where she was reporting pain). All external lesions have crusted over and do not appear infectious. Pt not known to be immunocompromised. Spoke with ophthalmology team and they are concerned for ongoing shingles infection in L eye because they are seeing inflammation in the anterior chamber, so it's reasonable to continue antiviral treatment for now. No clinical signs of bacterial suprainfection    Recommendations:   - continue iv acyclovir when inpatient. When ready for d/c, can transition to high dose po valtrex 1gm tid (normal renal function). Would plan on another 14 days, but duration should be until resolution of lesions on eye exam, so will needs f/u optho eval arranged outpatient to determine when to stop valtrex.  - routine hiv and hep c testing per cdc guidelines            Thank you for your consult. I will sign off. Please contact us if you have any additional questions.    Joy Clemons  "MD  Infectious Disease  [facility] ([facility])    Subjective:     Principal Problem: Herpes zoster ophthalmicus of left eye    HPI: 68F with h/o asthma, COPD, seizures, and neurofibromatosis admitted 5/18 as transfer from [facility] for optho exam. Pt reports approx 3 weeks of pain at and above L eye. Says she can't open her L eye without physically holding it upon, which has been going on about 1 month. But says she's able to see clearly when eyes held open. Denies f/c. Reports some drainage to L eye. Reports recently having some pain under b/l breasts and thinks she may have had a rash, but improved. Denies other new skin lesions or other areas of pain. Denies hearing loss or ear pain. Had been on valtrex outpatient and taking gabapentin for pain.    Has been seen by optho- noted "VA 20/30, IOP good, pupil fixed at approx 3 mm, AC with trace cell, K with few inferior KP's and diffuse PEE's. No carrington dendrite/pseudodendrites. Dilated exam without evidence of retinal necrosis (PVD present OU, which could explain floaters)"    Optho recommending iv acyclovir steroid drops and id consult    Day #3 IV acyclovir    Took po valtrex 1 tablet (1,000 mg total) by mouth 3 (three) times daily. for 10 days (4/26 - 5/6)    ID consulted for "Herpes zoster ophthalmicus - currently on IV acyclovir. Further treatment recs?     Past Medical History:   Diagnosis Date    Asthma     COPD (chronic obstructive pulmonary disease)     Neurofibromatosis     Seizures        No past surgical history on file.    Review of patient's allergies indicates:   Allergen Reactions    Avelox [moxifloxacin] Swelling       No current facility-administered medications on file prior to encounter.     Current Outpatient Medications on File Prior to Encounter   Medication Sig    acetaminophen (TYLENOL) 325 MG tablet Take 2 tablets (650 mg total) by mouth every 4 (four) hours as needed.    albuterol 90 mcg/actuation inhaler Inhale 1-2 puffs into " the lungs every 6 (six) hours as needed for Wheezing. Rescue    aspirin 325 MG tablet Take 1 tablet (325 mg total) by mouth 2 (two) times daily.    budesonide-formoterol 80-4.5 mcg (SYMBICORT) 80-4.5 mcg/actuation HFAA Inhale 2 puffs into the lungs 2 (two) times daily. Controller    butalbital-acetaminophen-caffeine -40 mg (FIORICET, ESGIC) -40 mg per tablet Take 1 tablet by mouth every 4 (four) hours as needed for Pain.    fluticasone propionate (FLONASE) 50 mcg/actuation nasal spray 1 spray (50 mcg total) by Each Nostril route 2 (two) times daily as needed for Rhinitis.    levocetirizine (XYZAL) 5 MG tablet Take 1 tablet (5 mg total) by mouth every evening.    LORazepam (ATIVAN) 1 MG tablet Take 1 mg by mouth nightly as needed for Anxiety.    naproxen (NAPROSYN) 500 MG tablet Take 1 tablet (500 mg total) by mouth 2 (two) times daily with meals.    oxyCODONE (ROXICODONE) 5 MG immediate release tablet Take 1 tablet (5 mg total) by mouth every 4 (four) hours as needed for Pain.    senna-docusate 8.6-50 mg (PERICOLACE) 8.6-50 mg per tablet Take 1 tablet by mouth 2 (two) times daily.    valACYclovir (VALTREX) 1000 MG tablet Take 1 tablet (1,000 mg total) by mouth 3 (three) times daily. for 10 days     Family History    None       Tobacco Use    Smoking status: Never    Smokeless tobacco: Never   Substance and Sexual Activity    Alcohol use: No    Drug use: No    Sexual activity: Not on file     Review of Systems   Constitutional:  Negative for chills and fever.   HENT:  Negative for ear discharge and ear pain.    Eyes:  Positive for photophobia, pain, discharge and visual disturbance.   Respiratory:  Negative for cough and shortness of breath.    Cardiovascular:  Negative for chest pain.   Gastrointestinal:  Positive for nausea and vomiting. Negative for abdominal pain.   Musculoskeletal:  Negative for arthralgias and myalgias.   Skin:  Positive for rash.   Neurological:  Positive for dizziness and  light-headedness.     Objective:     Vital Signs (Most Recent):  Temp: 98 °F (36.7 °C) (05/19/24 1137)  Pulse: 90 (05/19/24 1137)  Resp: 17 (05/19/24 1137)  BP: (!) 162/84 (05/19/24 1137)  SpO2: 96 % (05/19/24 1137) Vital Signs (24h Range):  Temp:  [97.6 °F (36.4 °C)-98.6 °F (37 °C)] 98 °F (36.7 °C)  Pulse:  [89-92] 90  Resp:  [17-19] 17  SpO2:  [91 %-96 %] 96 %  BP: (128-162)/(61-84) 162/84     Weight: 68.9 kg (151 lb 14.4 oz)  Body mass index is 28.7 kg/m².     Physical Exam  Vitals and nursing note reviewed.   Constitutional:       General: She is not in acute distress.     Appearance: She is not ill-appearing.   HENT:      Head: Normocephalic and atraumatic.   Eyes:      Extraocular Movements: Extraocular movements intact.      Comments: Crusted vesicular lesions along the L V1 dermatome with lesions extending onto the L eyelid, neurofibromas present on eyelids   Cardiovascular:      Rate and Rhythm: Normal rate and regular rhythm.      Heart sounds: Normal heart sounds.   Pulmonary:      Effort: Pulmonary effort is normal. No respiratory distress.      Breath sounds: Normal breath sounds.   Musculoskeletal:      Right lower leg: No edema.      Left lower leg: No edema.   Skin:     General: Skin is warm and dry.      Comments:   Diffuse neurofibromas  Erythematous skins and healing lesions below both breasts, no obvious vesicular lesions   Neurological:      General: No focal deficit present.      Mental Status: She is alert and oriented to person, place, and time.   Psychiatric:         Mood and Affect: Mood normal.         Behavior: Behavior normal.                Significant Labs: All pertinent labs within the past 24 hours have been reviewed.    Significant Imaging: I have reviewed all pertinent imaging results/findings within the past 24 hours.

## 2024-05-19 NOTE — SUBJECTIVE & OBJECTIVE
Interval History: NAEO. AF, 91%+ on RA. Reports that pain has improved this morning, but she still cannot open her L eye. The L eyelid does appear more erythematous this morning and the skin overlying her L forehead is erythematous with a campbell demarcation at the midline.     Review of Systems   Constitutional:  Negative for chills and fever.   Eyes:  Positive for photophobia, pain, discharge and visual disturbance.   Gastrointestinal:  Positive for nausea and vomiting. Negative for abdominal pain.   Skin:  Positive for rash.     Objective:     Vital Signs (Most Recent):  Temp: 98.3 °F (36.8 °C) (05/19/24 0805)  Pulse: 90 (05/19/24 0805)  Resp: 17 (05/19/24 0805)  BP: (!) 150/79 (05/19/24 0805)  SpO2: (!) 92 % (05/19/24 0805) Vital Signs (24h Range):  Temp:  [97.6 °F (36.4 °C)-98.6 °F (37 °C)] 98.3 °F (36.8 °C)  Pulse:  [84-92] 90  Resp:  [17-19] 17  SpO2:  [91 %-98 %] 92 %  BP: (121-166)/(61-79) 150/79     Weight: 68.9 kg (151 lb 14.4 oz)  Body mass index is 28.7 kg/m².    Intake/Output Summary (Last 24 hours) at 5/19/2024 0918  Last data filed at 5/19/2024 0530  Gross per 24 hour   Intake 880 ml   Output 400 ml   Net 480 ml         Physical Exam  Vitals and nursing note reviewed.   Constitutional:       General: She is not in acute distress.     Appearance: She is not ill-appearing.   HENT:      Head: Normocephalic and atraumatic.      Comments:   Crusted vesicular lesions along the L V1 dermatome with lesions extending onto the L eyelid, neurofibromas present on eyelids  Skin overlying L forehead and L eyelid more erythematous today, notable demarcation along midline of forehead     Mouth/Throat:      Mouth: Mucous membranes are dry.   Cardiovascular:      Rate and Rhythm: Normal rate and regular rhythm.   Pulmonary:      Effort: Pulmonary effort is normal. No respiratory distress.   Musculoskeletal:         General: Normal range of motion.      Right lower leg: No edema.      Left lower leg: No edema.   Skin:      General: Skin is warm and dry.      Comments:   Diffuse neurofibromas  Erythematous skin and healing lesions below both breasts, no obvious vesicular lesions    Neurological:      General: No focal deficit present.      Mental Status: She is alert and oriented to person, place, and time.   Psychiatric:         Mood and Affect: Mood normal.         Behavior: Behavior normal.             Significant Labs: All pertinent labs within the past 24 hours have been reviewed.    Significant Imaging: I have reviewed all pertinent imaging results/findings within the past 24 hours.

## 2024-05-19 NOTE — ASSESSMENT & PLAN NOTE
68F with h/o asthma, COPD, seizures, and neurofibromatosis admitted 5/18 as transfer from [facility] for optho exam. Around 4/26 had shingles over L eye and was started on high dose valtrex 1gm tid x 10 days. Lesions on face have all crusted over, but she's having ongoing issues with pain and opening L eye without physically holding it open with hands    Shingles appears limited to V1 dermatome and not disseminated (no signs of shingles under breast where she was reporting pain). All external lesions have crusted over and do not appear infectious. Pt not known to be immunocompromised. Spoke with ophthalmology team and they are concerned for ongoing shingles infection in L eye because they are seeing inflammation in the anterior chamber, so it's reasonable to continue antiviral treatment for now. No clinical signs of bacterial suprainfection    Recommendations:   - continue iv acyclovir when inpatient. When ready for d/c, can transition to high dose po valtrex 1gm tid (normal renal function). Would plan on another 14 days, but duration should be until resolution of lesions on eye exam, so will needs f/u optho eval arranged outpatient to determine when to stop valtrex.  - routine hiv and hep c testing per cdc guidelines

## 2024-05-19 NOTE — SUBJECTIVE & OBJECTIVE
Past Medical History:   Diagnosis Date    Asthma     COPD (chronic obstructive pulmonary disease)     Neurofibromatosis     Seizures        No past surgical history on file.    Review of patient's allergies indicates:   Allergen Reactions    Avelox [moxifloxacin] Swelling       No current facility-administered medications on file prior to encounter.     Current Outpatient Medications on File Prior to Encounter   Medication Sig    acetaminophen (TYLENOL) 325 MG tablet Take 2 tablets (650 mg total) by mouth every 4 (four) hours as needed.    albuterol 90 mcg/actuation inhaler Inhale 1-2 puffs into the lungs every 6 (six) hours as needed for Wheezing. Rescue    aspirin 325 MG tablet Take 1 tablet (325 mg total) by mouth 2 (two) times daily.    budesonide-formoterol 80-4.5 mcg (SYMBICORT) 80-4.5 mcg/actuation HFAA Inhale 2 puffs into the lungs 2 (two) times daily. Controller    butalbital-acetaminophen-caffeine -40 mg (FIORICET, ESGIC) -40 mg per tablet Take 1 tablet by mouth every 4 (four) hours as needed for Pain.    fluticasone propionate (FLONASE) 50 mcg/actuation nasal spray 1 spray (50 mcg total) by Each Nostril route 2 (two) times daily as needed for Rhinitis.    levocetirizine (XYZAL) 5 MG tablet Take 1 tablet (5 mg total) by mouth every evening.    LORazepam (ATIVAN) 1 MG tablet Take 1 mg by mouth nightly as needed for Anxiety.    naproxen (NAPROSYN) 500 MG tablet Take 1 tablet (500 mg total) by mouth 2 (two) times daily with meals.    oxyCODONE (ROXICODONE) 5 MG immediate release tablet Take 1 tablet (5 mg total) by mouth every 4 (four) hours as needed for Pain.    senna-docusate 8.6-50 mg (PERICOLACE) 8.6-50 mg per tablet Take 1 tablet by mouth 2 (two) times daily.    valACYclovir (VALTREX) 1000 MG tablet Take 1 tablet (1,000 mg total) by mouth 3 (three) times daily. for 10 days     Family History    None       Tobacco Use    Smoking status: Never    Smokeless tobacco: Never   Substance and Sexual  Activity    Alcohol use: No    Drug use: No    Sexual activity: Not on file     Review of Systems   Constitutional:  Negative for chills and fever.   HENT:  Negative for ear discharge and ear pain.    Eyes:  Positive for photophobia, pain, discharge and visual disturbance.   Respiratory:  Negative for cough and shortness of breath.    Cardiovascular:  Negative for chest pain.   Gastrointestinal:  Positive for nausea and vomiting. Negative for abdominal pain.   Musculoskeletal:  Negative for arthralgias and myalgias.   Skin:  Positive for rash.   Neurological:  Positive for dizziness and light-headedness.     Objective:     Vital Signs (Most Recent):  Temp: 98 °F (36.7 °C) (05/19/24 1137)  Pulse: 90 (05/19/24 1137)  Resp: 17 (05/19/24 1137)  BP: (!) 162/84 (05/19/24 1137)  SpO2: 96 % (05/19/24 1137) Vital Signs (24h Range):  Temp:  [97.6 °F (36.4 °C)-98.6 °F (37 °C)] 98 °F (36.7 °C)  Pulse:  [89-92] 90  Resp:  [17-19] 17  SpO2:  [91 %-96 %] 96 %  BP: (128-162)/(61-84) 162/84     Weight: 68.9 kg (151 lb 14.4 oz)  Body mass index is 28.7 kg/m².     Physical Exam  Vitals and nursing note reviewed.   Constitutional:       General: She is not in acute distress.     Appearance: She is not ill-appearing.   HENT:      Head: Normocephalic and atraumatic.   Eyes:      Extraocular Movements: Extraocular movements intact.      Comments: Crusted vesicular lesions along the L V1 dermatome with lesions extending onto the L eyelid, neurofibromas present on eyelids   Cardiovascular:      Rate and Rhythm: Normal rate and regular rhythm.      Heart sounds: Normal heart sounds.   Pulmonary:      Effort: Pulmonary effort is normal. No respiratory distress.      Breath sounds: Normal breath sounds.   Musculoskeletal:      Right lower leg: No edema.      Left lower leg: No edema.   Skin:     General: Skin is warm and dry.      Comments:   Diffuse neurofibromas  Erythematous skins and healing lesions below both breasts, no obvious vesicular  lesions   Neurological:      General: No focal deficit present.      Mental Status: She is alert and oriented to person, place, and time.   Psychiatric:         Mood and Affect: Mood normal.         Behavior: Behavior normal.                Significant Labs: All pertinent labs within the past 24 hours have been reviewed.    Significant Imaging: I have reviewed all pertinent imaging results/findings within the past 24 hours.

## 2024-05-19 NOTE — ASSESSMENT & PLAN NOTE
3-4 week history of vesicular lesions and pain extending along the L V1 dermatome including the L eyelid. She reports L eye discharge and pain that impacts her vision. She was seen in the ED on 04/26 for this and followed outpatient by her PCP and ophthalmologist. Was taking Valtrex and using steroid eye drops, but the pain and rash persisted. Transferred here for inpatient ophthalmology evaluation. Vision impaired by discharge and presence of rash and neurofibromas on L eyelid, but otherwise no vision loss.     - Ophthalmology consulted, recommended additional eye drop medications and ID consult  - ID consulted  - IV acyclovir 10mg/kg q8h  - Gabapentin 100mg TID, Tylenol and oxy 5mg prn for pain

## 2024-05-19 NOTE — PLAN OF CARE
Problem: Adult Inpatient Plan of Care  Goal: Plan of Care Review  Outcome: Progressing  Goal: Patient-Specific Goal (Individualized)  Outcome: Progressing  Goal: Absence of Hospital-Acquired Illness or Injury  Outcome: Progressing  Goal: Optimal Comfort and Wellbeing  Outcome: Progressing  Goal: Readiness for Transition of Care  Outcome: Progressing     Problem: Skin Injury Risk Increased  Goal: Skin Health and Integrity  Outcome: Progressing     Problem: Infection  Goal: Absence of Infection Signs and Symptoms  Outcome: Progressing

## 2024-05-19 NOTE — HPI
"Ms. Dial is a 68F with PMH of asthma, COPD, seizures, and neurofibromatosis admitted 5/18 as transfer from [facility] for optho exam. Pt reports approx 3 weeks of pain at and above L eye. Says she can't open her L eye without physically holding it upon, which has been going on about 1 month. But says she's able to see clearly when eyes held open. Denies f/c. Reports some drainage to L eye. Reports recently having some pain under b/l breasts and thinks she may have had a rash, but improved. Denies other new skin lesions or other areas of pain. Denies hearing loss or ear pain. Had been on valtrex outpatient and taking gabapentin for pain. Took po valtrex 1 tablet (1,000 mg total) by mouth 3 (three) times daily. for 10 days (4/26 - 5/6)    Has been seen by optho- noted "VA 20/30, IOP good, pupil fixed at approx 3 mm, AC with trace cell, K with few inferior KP's and diffuse PEE's. No carrington dendrite/pseudodendrites. Dilated exam without evidence of retinal necrosis (PVD present OU, which could explain floaters)"    Optho recommending iv acyclovir steroid drops and id consult    On last ID evaluation, plan was to continue IV acyclovir while inpatient, and to transition to PO valtrex 1gm tid (normal renal function) for tentatively 14 days, but dependent on clinical improvement and resolution of eye lesions.    Infectious disease now re-consulted for "Herpes zoster ophthalmicus now with GPC bactermeia, rapid ID showing MSSA. Antibiotic recs?".   "

## 2024-05-19 NOTE — PROGRESS NOTES
[facility] - Med Surg ([address] Medicine  Progress Note    Patient Name: Jessi Dial  MRN: [id number]  Patient Class: IP- Inpatient   Admission Date: 5/18/2024  Length of Stay: 1 days  Attending Physician: Ed Jolly MD  Primary Care Provider: Oscar Shafer MD        Subjective:     Principal Problem:Herpes zoster ophthalmicus of left eye        HPI:  Jessi Dial onesimo  69 yo W w/ PMH of asthma, COPD, seizures, and neurofibromatosis who presented to [facility] ED for persistent pain tot he left side of her face and was transferred to [facility] for inpatient ophthalmology evaluation. She reports that about 5 weeks ago she started having pain b/l below her breasts that later developed into an itchy and painful foul-smelling rash. This has been improving. She presented initially to the ED on 04/23 for L-sided headache and ear pain with N/V and was treated and discharged. She then presented to [facility] ED on 04/26 for a rash with an associated burning sensation that covered the L forehead and upper eyelid. The ED provider noted that she had no Pemberton sign and fluorescein staining of the L eye showed no dendritic lesions. She was discharged with valacyclovir and prednisolone eye drops but presented to the ED again yesterday as the pain had continued to persist despite the medications. She was followed outpatient by her PCP and an ophthalmologist. The vesicular lesions have begun to crust over but she is continuing to have significant pain over her L eye and struggles to keep it open. She reports that she was started on gabapentin outpatient for additional pain control and that it initially made her drowsy and confused but that those symptoms resolved as she continued taking it. Reports vision changes due to eye discharge and pain. Denies fever, chills, hearing changes, abdominal pain, shortness of breath, cough, chest pain.    In the OSH ED, she was hypertensive and tachycardic,  afebrile. Labs notable for leukocytosis to 13.11 and Na 134. CTH without evidence of acute intracranial abnormality but notable for numerous scalp neurofibromas. Received a dose of IV acyclovir. Transferred here for further evaluation and management.     Overview/Hospital Course:  Admitted to hospital medicine for herpes zoster ophthalmicus. Started on IV acyclovir. Ophthalmology consulted and added several eye drops. ID consulted.    Interval History: NAEO. AF, 91%+ on RA. Reports that pain has improved this morning, but she still cannot open her L eye. The L eyelid does appear more erythematous this morning and the skin overlying her L forehead is erythematous with a campbell demarcation at the midline.     Review of Systems   Constitutional:  Negative for chills and fever.   Eyes:  Positive for photophobia, pain, discharge and visual disturbance.   Gastrointestinal:  Positive for nausea and vomiting. Negative for abdominal pain.   Skin:  Positive for rash.     Objective:     Vital Signs (Most Recent):  Temp: 98.3 °F (36.8 °C) (05/19/24 0805)  Pulse: 90 (05/19/24 0805)  Resp: 17 (05/19/24 0805)  BP: (!) 150/79 (05/19/24 0805)  SpO2: (!) 92 % (05/19/24 0805) Vital Signs (24h Range):  Temp:  [97.6 °F (36.4 °C)-98.6 °F (37 °C)] 98.3 °F (36.8 °C)  Pulse:  [84-92] 90  Resp:  [17-19] 17  SpO2:  [91 %-98 %] 92 %  BP: (121-166)/(61-79) 150/79     Weight: 68.9 kg (151 lb 14.4 oz)  Body mass index is 28.7 kg/m².    Intake/Output Summary (Last 24 hours) at 5/19/2024 0918  Last data filed at 5/19/2024 0530  Gross per 24 hour   Intake 880 ml   Output 400 ml   Net 480 ml         Physical Exam  Vitals and nursing note reviewed.   Constitutional:       General: She is not in acute distress.     Appearance: She is not ill-appearing.   HENT:      Head: Normocephalic and atraumatic.      Comments:   Crusted vesicular lesions along the L V1 dermatome with lesions extending onto the L eyelid, neurofibromas present on eyelids  Skin  overlying L forehead and L eyelid more erythematous today, notable demarcation along midline of forehead     Mouth/Throat:      Mouth: Mucous membranes are dry.   Cardiovascular:      Rate and Rhythm: Normal rate and regular rhythm.   Pulmonary:      Effort: Pulmonary effort is normal. No respiratory distress.   Musculoskeletal:         General: Normal range of motion.      Right lower leg: No edema.      Left lower leg: No edema.   Skin:     General: Skin is warm and dry.      Comments:   Diffuse neurofibromas  Erythematous skin and healing lesions below both breasts, no obvious vesicular lesions    Neurological:      General: No focal deficit present.      Mental Status: She is alert and oriented to person, place, and time.   Psychiatric:         Mood and Affect: Mood normal.         Behavior: Behavior normal.             Significant Labs: All pertinent labs within the past 24 hours have been reviewed.    Significant Imaging: I have reviewed all pertinent imaging results/findings within the past 24 hours.    Assessment/Plan:      * Herpes zoster ophthalmicus of left eye  3-4 week history of vesicular lesions and pain extending along the L V1 dermatome including the L eyelid. She reports L eye discharge and pain that impacts her vision. She was seen in the ED on 04/26 for this and followed outpatient by her PCP and ophthalmologist. Was taking Valtrex and using steroid eye drops, but the pain and rash persisted. Transferred here for inpatient ophthalmology evaluation. Vision impaired by discharge and presence of rash and neurofibromas on L eyelid, but otherwise no vision loss.     - Ophthalmology consulted, recommended additional eye drop medications and ID consult  - ID consulted  - IV acyclovir 10mg/kg q8h  - Gabapentin 100mg TID, Tylenol and oxy 5mg prn for pain    Anxiety  Continuing home ativan      Neurofibromatosis  Hx of neurofibromatosis      Asthma  Continue home inhaler        VTE Risk Mitigation (From  admission, onward)           Ordered     IP VTE LOW RISK PATIENT  Once         05/18/24 1505     Place sequential compression device  Until discontinued         05/18/24 1505                    Discharge Planning   AJ:      Code Status: Full Code   Is the patient medically ready for discharge?:     Reason for patient still in hospital (select all that apply): Treatment and Consult recommendations                     Duyen Joy MD  Department of Hospital Medicine   [facility] Surg ([place])

## 2024-05-19 NOTE — HOSPITAL COURSE
Admitted to hospital medicine for herpes zoster ophthalmicus. Started on IV acyclovir. Ophthalmology consulted and added several eye drops. ID consulted with recs to transition to PO valtrex for 14 days upon discharge. Swelling and vision with mild improvement noted on 5/20/24 but significant pain still present. Became febrile and tachycardic with a new leukocytosis on 05/21, started on vanc + rocephin. CXR without an acute intrathoracic process. BCx growing GPCs and rapid ID showing MSSA, deescalated to ancef. CT maxillofacial showing mild paranasal sinus disease with aerated secretions in L sphenoid sinus and innumerable cutaneous nodules consistent with neurofibromatosis. ID re-consulted for new bacteremia. Repeat BCx NGTD. RUE US showing thrombophlebitis. TTE with EF 60-65% and without valvular vegetations. Zoster lesions visualized to be scabbed over and dry, isolation precautions discontinued. Midline placed for continued IV abx therapy, end date to be 06/20/24. IV acyclovir transitioning to Valtrex on discharge, to be continued until f/u with ophthalmologist in [place]. She has been medically stable with improving rash and pain. Discharging to SNF.

## 2024-05-20 PROBLEM — R03.0 ELEVATED BP WITHOUT DIAGNOSIS OF HYPERTENSION: Status: ACTIVE | Noted: 2024-05-20

## 2024-05-20 LAB
ALBUMIN SERPL BCP-MCNC: 2.8 G/DL (ref 3.5–5.2)
ALP SERPL-CCNC: 100 U/L (ref 55–135)
ALT SERPL W/O P-5'-P-CCNC: 9 U/L (ref 10–44)
ANION GAP SERPL CALC-SCNC: 7 MMOL/L (ref 8–16)
AST SERPL-CCNC: 13 U/L (ref 10–40)
BASOPHILS # BLD AUTO: 0.06 K/UL (ref 0–0.2)
BASOPHILS NFR BLD: 0.6 % (ref 0–1.9)
BILIRUB SERPL-MCNC: 0.5 MG/DL (ref 0.1–1)
BUN SERPL-MCNC: 7 MG/DL (ref 8–23)
CALCIUM SERPL-MCNC: 8.9 MG/DL (ref 8.7–10.5)
CHLORIDE SERPL-SCNC: 97 MMOL/L (ref 95–110)
CO2 SERPL-SCNC: 27 MMOL/L (ref 23–29)
CREAT SERPL-MCNC: 0.6 MG/DL (ref 0.5–1.4)
DIFFERENTIAL METHOD BLD: ABNORMAL
EOSINOPHIL # BLD AUTO: 0.1 K/UL (ref 0–0.5)
EOSINOPHIL NFR BLD: 0.8 % (ref 0–8)
ERYTHROCYTE [DISTWIDTH] IN BLOOD BY AUTOMATED COUNT: 13.8 % (ref 11.5–14.5)
EST. GFR  (NO RACE VARIABLE): >60 ML/MIN/1.73 M^2
GLUCOSE SERPL-MCNC: 85 MG/DL (ref 70–110)
HCT VFR BLD AUTO: 35.9 % (ref 37–48.5)
HGB BLD-MCNC: 11.6 G/DL (ref 12–16)
IMM GRANULOCYTES # BLD AUTO: 0.08 K/UL (ref 0–0.04)
IMM GRANULOCYTES NFR BLD AUTO: 0.8 % (ref 0–0.5)
LYMPHOCYTES # BLD AUTO: 0.7 K/UL (ref 1–4.8)
LYMPHOCYTES NFR BLD: 7 % (ref 18–48)
MAGNESIUM SERPL-MCNC: 1.7 MG/DL (ref 1.6–2.6)
MCH RBC QN AUTO: 28.9 PG (ref 27–31)
MCHC RBC AUTO-ENTMCNC: 32.3 G/DL (ref 32–36)
MCV RBC AUTO: 90 FL (ref 82–98)
MONOCYTES # BLD AUTO: 1.4 K/UL (ref 0.3–1)
MONOCYTES NFR BLD: 14.6 % (ref 4–15)
NEUTROPHILS # BLD AUTO: 7.3 K/UL (ref 1.8–7.7)
NEUTROPHILS NFR BLD: 76.2 % (ref 38–73)
NRBC BLD-RTO: 0 /100 WBC
PHOSPHATE SERPL-MCNC: 2.5 MG/DL (ref 2.7–4.5)
PLATELET # BLD AUTO: 291 K/UL (ref 150–450)
PMV BLD AUTO: 10.4 FL (ref 9.2–12.9)
POTASSIUM SERPL-SCNC: 3.4 MMOL/L (ref 3.5–5.1)
PROT SERPL-MCNC: 6 G/DL (ref 6–8.4)
RBC # BLD AUTO: 4.01 M/UL (ref 4–5.4)
SODIUM SERPL-SCNC: 131 MMOL/L (ref 136–145)
WBC # BLD AUTO: 9.57 K/UL (ref 3.9–12.7)

## 2024-05-20 PROCEDURE — 27000207 HC ISOLATION

## 2024-05-20 PROCEDURE — 84100 ASSAY OF PHOSPHORUS: CPT | Performed by: STUDENT IN AN ORGANIZED HEALTH CARE EDUCATION/TRAINING PROGRAM

## 2024-05-20 PROCEDURE — 63600175 PHARM REV CODE 636 W HCPCS

## 2024-05-20 PROCEDURE — 11000001 HC ACUTE MED/SURG PRIVATE ROOM

## 2024-05-20 PROCEDURE — 83735 ASSAY OF MAGNESIUM: CPT | Performed by: STUDENT IN AN ORGANIZED HEALTH CARE EDUCATION/TRAINING PROGRAM

## 2024-05-20 PROCEDURE — 63600175 PHARM REV CODE 636 W HCPCS: Performed by: STUDENT IN AN ORGANIZED HEALTH CARE EDUCATION/TRAINING PROGRAM

## 2024-05-20 PROCEDURE — 25000003 PHARM REV CODE 250

## 2024-05-20 PROCEDURE — 80053 COMPREHEN METABOLIC PANEL: CPT | Performed by: STUDENT IN AN ORGANIZED HEALTH CARE EDUCATION/TRAINING PROGRAM

## 2024-05-20 PROCEDURE — 25000003 PHARM REV CODE 250: Performed by: STUDENT IN AN ORGANIZED HEALTH CARE EDUCATION/TRAINING PROGRAM

## 2024-05-20 PROCEDURE — 85025 COMPLETE CBC W/AUTO DIFF WBC: CPT | Performed by: STUDENT IN AN ORGANIZED HEALTH CARE EDUCATION/TRAINING PROGRAM

## 2024-05-20 PROCEDURE — 36415 COLL VENOUS BLD VENIPUNCTURE: CPT | Performed by: STUDENT IN AN ORGANIZED HEALTH CARE EDUCATION/TRAINING PROGRAM

## 2024-05-20 RX ORDER — PREDNISOLONE ACETATE 10 MG/ML
1 SUSPENSION/ DROPS OPHTHALMIC 4 TIMES DAILY
COMMUNITY

## 2024-05-20 RX ORDER — POTASSIUM CHLORIDE 20 MEQ/1
40 TABLET, EXTENDED RELEASE ORAL ONCE
Status: COMPLETED | OUTPATIENT
Start: 2024-05-20 | End: 2024-05-20

## 2024-05-20 RX ORDER — LOSARTAN POTASSIUM 50 MG/1
50 TABLET ORAL DAILY
COMMUNITY

## 2024-05-20 RX ORDER — GABAPENTIN 100 MG/1
100 CAPSULE ORAL 3 TIMES DAILY
Status: ON HOLD | COMMUNITY
End: 2024-05-24 | Stop reason: HOSPADM

## 2024-05-20 RX ORDER — LISINOPRIL 2.5 MG/1
5 TABLET ORAL DAILY
Status: DISCONTINUED | OUTPATIENT
Start: 2024-05-20 | End: 2024-05-21

## 2024-05-20 RX ORDER — MAGNESIUM SULFATE HEPTAHYDRATE 40 MG/ML
2 INJECTION, SOLUTION INTRAVENOUS ONCE
Status: COMPLETED | OUTPATIENT
Start: 2024-05-20 | End: 2024-05-20

## 2024-05-20 RX ORDER — GABAPENTIN 100 MG/1
200 CAPSULE ORAL 3 TIMES DAILY
Status: DISCONTINUED | OUTPATIENT
Start: 2024-05-20 | End: 2024-05-23

## 2024-05-20 RX ORDER — SODIUM,POTASSIUM PHOSPHATES 280-250MG
2 POWDER IN PACKET (EA) ORAL ONCE
Status: COMPLETED | OUTPATIENT
Start: 2024-05-20 | End: 2024-05-20

## 2024-05-20 RX ADMIN — ONDANSETRON 4 MG: 2 INJECTION INTRAMUSCULAR; INTRAVENOUS at 10:05

## 2024-05-20 RX ADMIN — LORAZEPAM 1 MG: 0.5 TABLET ORAL at 07:05

## 2024-05-20 RX ADMIN — OXYCODONE 5 MG: 5 TABLET ORAL at 09:05

## 2024-05-20 RX ADMIN — ATROPINE SULFATE 1 DROP: 10 SOLUTION/ DROPS OPHTHALMIC at 09:05

## 2024-05-20 RX ADMIN — CARBOXYMETHYLCELLULOSE SODIUM 1 DROP: 10 GEL OPHTHALMIC at 04:05

## 2024-05-20 RX ADMIN — ERYTHROMYCIN: 5 OINTMENT OPHTHALMIC at 05:05

## 2024-05-20 RX ADMIN — POTASSIUM CHLORIDE 40 MEQ: 1500 TABLET, EXTENDED RELEASE ORAL at 09:05

## 2024-05-20 RX ADMIN — GABAPENTIN 200 MG: 100 CAPSULE ORAL at 08:05

## 2024-05-20 RX ADMIN — GABAPENTIN 200 MG: 100 CAPSULE ORAL at 02:05

## 2024-05-20 RX ADMIN — ACYCLOVIR SODIUM 480 MG: 50 INJECTION, SOLUTION INTRAVENOUS at 12:05

## 2024-05-20 RX ADMIN — ACETAMINOPHEN 650 MG: 325 TABLET ORAL at 02:05

## 2024-05-20 RX ADMIN — ACETAMINOPHEN 650 MG: 325 TABLET ORAL at 03:05

## 2024-05-20 RX ADMIN — OXYCODONE 5 MG: 5 TABLET ORAL at 07:05

## 2024-05-20 RX ADMIN — ACYCLOVIR SODIUM 480 MG: 50 INJECTION, SOLUTION INTRAVENOUS at 09:05

## 2024-05-20 RX ADMIN — ERYTHROMYCIN: 5 OINTMENT OPHTHALMIC at 08:05

## 2024-05-20 RX ADMIN — CARBOXYMETHYLCELLULOSE SODIUM 1 DROP: 10 GEL OPHTHALMIC at 02:05

## 2024-05-20 RX ADMIN — CARBOXYMETHYLCELLULOSE SODIUM 1 DROP: 10 GEL OPHTHALMIC at 09:05

## 2024-05-20 RX ADMIN — CARBOXYMETHYLCELLULOSE SODIUM 1 DROP: 10 GEL OPHTHALMIC at 10:05

## 2024-05-20 RX ADMIN — GABAPENTIN 100 MG: 100 CAPSULE ORAL at 09:05

## 2024-05-20 RX ADMIN — ACYCLOVIR SODIUM 480 MG: 50 INJECTION, SOLUTION INTRAVENOUS at 04:05

## 2024-05-20 RX ADMIN — ERYTHROMYCIN: 5 OINTMENT OPHTHALMIC at 02:05

## 2024-05-20 RX ADMIN — PREDNISOLONE ACETATE 1 DROP: 10 SUSPENSION/ DROPS OPHTHALMIC at 08:05

## 2024-05-20 RX ADMIN — MAGNESIUM SULFATE HEPTAHYDRATE 2 G: 40 INJECTION, SOLUTION INTRAVENOUS at 10:05

## 2024-05-20 RX ADMIN — OXYCODONE 5 MG: 5 TABLET ORAL at 03:05

## 2024-05-20 RX ADMIN — POTASSIUM & SODIUM PHOSPHATES POWDER PACK 280-160-250 MG 2 PACKET: 280-160-250 PACK at 09:05

## 2024-05-20 RX ADMIN — LISINOPRIL 5 MG: 2.5 TABLET ORAL at 09:05

## 2024-05-20 RX ADMIN — ERYTHROMYCIN: 5 OINTMENT OPHTHALMIC at 09:05

## 2024-05-20 NOTE — SUBJECTIVE & OBJECTIVE
Interval History: No acute events overnight. Pt continued on eye drop regimen. Vision and swelling with mild improvement. Acyclovir continued for treatment of acute Herpes Zoster with ocular involvement. Ophthalmology following.     Review of Systems   Constitutional:  Negative for chills and fever.   Eyes:  Positive for photophobia, pain, discharge and visual disturbance.   Gastrointestinal:  Positive for nausea and vomiting. Negative for abdominal pain.   Skin:  Positive for rash.     Objective:     Vital Signs (Most Recent):  Temp: 99.1 °F (37.3 °C) (05/20/24 1130)  Pulse: 108 (05/20/24 1130)  Resp: 18 (05/20/24 1130)  BP: 139/65 (05/20/24 1130)  SpO2: (!) 92 % (05/20/24 1130) Vital Signs (24h Range):  Temp:  [97.9 °F (36.6 °C)-99.4 °F (37.4 °C)] 99.1 °F (37.3 °C)  Pulse:  [] 108  Resp:  [17-19] 18  SpO2:  [92 %-94 %] 92 %  BP: (139-199)/(65-93) 139/65     Weight: 68.9 kg (151 lb 14.4 oz)  Body mass index is 28.7 kg/m².    Intake/Output Summary (Last 24 hours) at 5/20/2024 1148  Last data filed at 5/20/2024 0104  Gross per 24 hour   Intake 1200 ml   Output --   Net 1200 ml         Physical Exam  Vitals and nursing note reviewed.   Constitutional:       General: She is not in acute distress.     Appearance: She is not ill-appearing.   HENT:      Head: Normocephalic and atraumatic.      Comments: Crusted vesicular lesions along the L V1 dermatome with lesions extending onto the L eyelid, neurofibromas present on eyelids  Skin overlying L forehead and L eyelid more erythematous today, notable demarcation along midline of forehead     Nose: Nose normal. No congestion.      Mouth/Throat:      Mouth: Mucous membranes are dry.   Cardiovascular:      Rate and Rhythm: Normal rate and regular rhythm.      Pulses: Normal pulses.      Heart sounds: No murmur heard.  Pulmonary:      Effort: Pulmonary effort is normal. No respiratory distress.      Breath sounds: No wheezing.   Musculoskeletal:         General: Normal  range of motion.      Right lower leg: No edema.      Left lower leg: No edema.   Skin:     General: Skin is warm and dry.      Findings: Lesion and rash present.      Comments: Scabbed vesicular lesions on left brow  Diffuse neurofibromas     Neurological:      General: No focal deficit present.      Mental Status: She is alert and oriented to person, place, and time.   Psychiatric:         Mood and Affect: Mood normal.         Behavior: Behavior normal.             Significant Labs: All pertinent labs within the past 24 hours have been reviewed.    Significant Imaging: I have reviewed all pertinent imaging results/findings within the past 24 hours.

## 2024-05-20 NOTE — PLAN OF CARE
Patient rested most of the day. IV medications administered, eye drops and ointment administered. Patient not eating well, states doesn't like the food.

## 2024-05-20 NOTE — PROGRESS NOTES
[facility] - Med Surg ([address] Medicine  Progress Note    Patient Name: Jessi Dial  MRN: [id number]  Patient Class: IP- Inpatient   Admission Date: 5/18/2024  Length of Stay: 2 days  Attending Physician: Ed Jolly MD  Primary Care Provider: Oscar Shafer MD        Subjective:     Principal Problem:Herpes zoster ophthalmicus of left eye        HPI:  Jessi Dial onesimo  67 yo W w/ PMH of asthma, COPD, seizures, and neurofibromatosis who presented to [facility] ED for persistent pain tot he left side of her face and was transferred to [facility] for inpatient ophthalmology evaluation. She reports that about 5 weeks ago she started having pain b/l below her breasts that later developed into an itchy and painful foul-smelling rash. This has been improving. She presented initially to the ED on 04/23 for L-sided headache and ear pain with N/V and was treated and discharged. She then presented to [facility] ED on 04/26 for a rash with an associated burning sensation that covered the L forehead and upper eyelid. The ED provider noted that she had no Pemberton sign and fluorescein staining of the L eye showed no dendritic lesions. She was discharged with valacyclovir and prednisolone eye drops but presented to the ED again yesterday as the pain had continued to persist despite the medications. She was followed outpatient by her PCP and an ophthalmologist. The vesicular lesions have begun to crust over but she is continuing to have significant pain over her L eye and struggles to keep it open. She reports that she was started on gabapentin outpatient for additional pain control and that it initially made her drowsy and confused but that those symptoms resolved as she continued taking it. Reports vision changes due to eye discharge and pain. Denies fever, chills, hearing changes, abdominal pain, shortness of breath, cough, chest pain.    In the OSH ED, she was hypertensive and tachycardic,  afebrile. Labs notable for leukocytosis to 13.11 and Na 134. CTH without evidence of acute intracranial abnormality but notable for numerous scalp neurofibromas. Received a dose of IV acyclovir. Transferred here for further evaluation and management.     Overview/Hospital Course:  Admitted to hospital medicine for herpes zoster ophthalmicus. Started on IV acyclovir. Ophthalmology consulted and added several eye drops. ID consulted with recs to transition to PO valtrex for 14 days upon discharge. Swelling and vision with mild improvement noted on 5/20/24 but significant pain still present.    Interval History: No acute events overnight. Pt continued on eye drop regimen. Vision and swelling with mild improvement. Acyclovir continued for treatment of acute Herpes Zoster with ocular involvement. Ophthalmology following.     Review of Systems   Constitutional:  Negative for chills and fever.   Eyes:  Positive for photophobia, pain, discharge and visual disturbance.   Gastrointestinal:  Positive for nausea and vomiting. Negative for abdominal pain.   Skin:  Positive for rash.     Objective:     Vital Signs (Most Recent):  Temp: 99.1 °F (37.3 °C) (05/20/24 1130)  Pulse: 108 (05/20/24 1130)  Resp: 18 (05/20/24 1130)  BP: 139/65 (05/20/24 1130)  SpO2: (!) 92 % (05/20/24 1130) Vital Signs (24h Range):  Temp:  [97.9 °F (36.6 °C)-99.4 °F (37.4 °C)] 99.1 °F (37.3 °C)  Pulse:  [] 108  Resp:  [17-19] 18  SpO2:  [92 %-94 %] 92 %  BP: (139-199)/(65-93) 139/65     Weight: 68.9 kg (151 lb 14.4 oz)  Body mass index is 28.7 kg/m².    Intake/Output Summary (Last 24 hours) at 5/20/2024 1148  Last data filed at 5/20/2024 0104  Gross per 24 hour   Intake 1200 ml   Output --   Net 1200 ml         Physical Exam  Vitals and nursing note reviewed.   Constitutional:       General: She is not in acute distress.     Appearance: She is not ill-appearing.   HENT:      Head: Normocephalic and atraumatic.      Comments: Crusted vesicular lesions  along the L V1 dermatome with lesions extending onto the L eyelid, neurofibromas present on eyelids  Skin overlying L forehead and L eyelid more erythematous today, notable demarcation along midline of forehead     Nose: Nose normal. No congestion.      Mouth/Throat:      Mouth: Mucous membranes are dry.   Cardiovascular:      Rate and Rhythm: Normal rate and regular rhythm.      Pulses: Normal pulses.      Heart sounds: No murmur heard.  Pulmonary:      Effort: Pulmonary effort is normal. No respiratory distress.      Breath sounds: No wheezing.   Musculoskeletal:         General: Normal range of motion.      Right lower leg: No edema.      Left lower leg: No edema.   Skin:     General: Skin is warm and dry.      Findings: Lesion and rash present.      Comments: Scabbed vesicular lesions on left brow  Diffuse neurofibromas     Neurological:      General: No focal deficit present.      Mental Status: She is alert and oriented to person, place, and time.   Psychiatric:         Mood and Affect: Mood normal.         Behavior: Behavior normal.             Significant Labs: All pertinent labs within the past 24 hours have been reviewed.    Significant Imaging: I have reviewed all pertinent imaging results/findings within the past 24 hours.    Assessment/Plan:      * Herpes zoster ophthalmicus of left eye  3-4 week history of vesicular lesions and pain extending along the L V1 dermatome including the L eyelid. She reports L eye discharge and pain that impacts her vision. She was seen in the ED on 04/26 for this and followed outpatient by her PCP and ophthalmologist. Was taking Valtrex and using steroid eye drops, but the pain and rash persisted. Transferred here for inpatient ophthalmology evaluation. Vision impaired by discharge and presence of rash and neurofibromas on L eyelid, but otherwise no vision loss.     - Ophthalmology consulted, recommended additional eye drop medications   - ID consulted with recs to continue  IV acyclovir 10mg/kg q8h & transition to PO valtrex 1gm tid   - Gabapentin 200mg TID, Tylenol and oxy 5mg prn for pain    Elevated BP without diagnosis of hypertension  - Lisinopril started for BP management while inpatient  - Trend BP and labs    Anxiety  Continuing home ativan      Neurofibromatosis  Hx of neurofibromatosis      Asthma  Continue home inhaler        VTE Risk Mitigation (From admission, onward)           Ordered     IP VTE LOW RISK PATIENT  Once         05/18/24 1505     Place sequential compression device  Until discontinued         05/18/24 1505                    Discharge Planning   AJ: 5/24/2024     Code Status: Full Code   Is the patient medically ready for discharge?:     Reason for patient still in hospital (select all that apply): Treatment               Alexandre Castillo MD  Department of Hospital Medicine   [facility] - Med Surg ([place]-)

## 2024-05-20 NOTE — ASSESSMENT & PLAN NOTE
3-4 week history of vesicular lesions and pain extending along the L V1 dermatome including the L eyelid. She reports L eye discharge and pain that impacts her vision. She was seen in the ED on 04/26 for this and followed outpatient by her PCP and ophthalmologist. Was taking Valtrex and using steroid eye drops, but the pain and rash persisted. Transferred here for inpatient ophthalmology evaluation. Vision impaired by discharge and presence of rash and neurofibromas on L eyelid, but otherwise no vision loss.     - Ophthalmology consulted, recommended additional eye drop medications   - ID consulted with recs to continue IV acyclovir 10mg/kg q8h & transition to PO valtrex 1gm tid   - Gabapentin 200mg TID, Tylenol and oxy 5mg prn for pain

## 2024-05-20 NOTE — PLAN OF CARE
SW attempted DPA w/ patient via phone, no answer. STEPHANIE phoned patient's spouse for completion of DPA. No answer. SW left  for callback.     SW will re-attempt.              MARCELO Munoz, LMSW  [facility]  Case Management  Ext. [phone]

## 2024-05-20 NOTE — PROGRESS NOTES
Consultation Report  Ophthalmology Service    Date: 05/20/2024    CC: blurry vision, new floaters intermittently for 1 month     HPI: Jessi Dial is a 68 y.o. female who has been having left sided facial rash and light sensitivity for 4 weeks. She has been treated as an outpatient by her PCP and ophthalmologist. She was previously on steroid drops and was taken off of them, but presented to her ophthalmologist yesterday 05/17 and was told to restart steroid drops BID. She also stated that she did have lesions on her eye that went away (per her eye doctor). She also states she was taking valtrex which had been decreased to BID from TID. She endorses difficulty opening eyelids but denies gross visual changes when eyes are open. Endorsing light sensitivity and scratchiness.         POH: CEIOL OU, plus above      Gtts: most recently pred forte BID, left eye    Family Hx: Denies family history of glaucoma, macular degeneration, or blindness. family history is not on file.     PMHx:  has a past medical history of Asthma, COPD (chronic obstructive pulmonary disease), Neurofibromatosis, and Seizures.     PSurgHx:  has no past surgical history on file.     Home Medications:   Prior to Admission medications    Medication Sig Start Date End Date Taking? Authorizing Provider   acetaminophen (TYLENOL) 325 MG tablet Take 2 tablets (650 mg total) by mouth every 4 (four) hours as needed. 2/25/18   Sukhwinder Oliva MD   budesonide-formoterol 80-4.5 mcg (SYMBICORT) 80-4.5 mcg/actuation HFAA Inhale 2 puffs into the lungs 2 (two) times daily. Controller 10/5/17 10/5/18  Ximena Bell MD   fluticasone propionate (FLONASE) 50 mcg/actuation nasal spray 1 spray (50 mcg total) by Each Nostril route 2 (two) times daily as needed for Rhinitis. 4/23/24   Stefan Burr DO   gabapentin (NEURONTIN) 100 MG capsule Take 100 mg by mouth 3 (three) times daily.    Provider, Historical   levocetirizine (XYZAL) 5 MG tablet  Take 1 tablet (5 mg total) by mouth every evening. 4/23/24 4/23/25  Stefan Burr DO   LORazepam (ATIVAN) 1 MG tablet Take 1 mg by mouth nightly as needed for Anxiety.    Provider, Historical   losartan (COZAAR) 50 MG tablet Take 50 mg by mouth once daily.    Provider, Historical   prednisoLONE acetate (PRED FORTE) 1 % DrpS Place 1 drop into the left eye 4 (four) times daily.    Provider, Historical   albuterol 90 mcg/actuation inhaler Inhale 1-2 puffs into the lungs every 6 (six) hours as needed for Wheezing. Rescue 10/5/17 5/20/24  Ximena Bell MD   aspirin 325 MG tablet Take 1 tablet (325 mg total) by mouth 2 (two) times daily. 2/25/18 5/20/24  Sukhwinder Oliva MD   butalbital-acetaminophen-caffeine -40 mg (FIORICET, ESGIC) -40 mg per tablet Take 1 tablet by mouth every 4 (four) hours as needed for Pain. 4/23/24 5/20/24  Stefan Burr,    naproxen (NAPROSYN) 500 MG tablet Take 1 tablet (500 mg total) by mouth 2 (two) times daily with meals. 7/2/21 5/20/24  Yvonne Garcia PA-C   oxyCODONE (ROXICODONE) 5 MG immediate release tablet Take 1 tablet (5 mg total) by mouth every 4 (four) hours as needed for Pain. 2/25/18 5/20/24  Sukhwinder Oliva MD   senna-docusate 8.6-50 mg (PERICOLACE) 8.6-50 mg per tablet Take 1 tablet by mouth 2 (two) times daily. 2/25/18 5/20/24  Sukhwinder Oliva MD   valACYclovir (VALTREX) 1000 MG tablet Take 1 tablet (1,000 mg total) by mouth 3 (three) times daily. for 10 days 4/26/24 5/20/24  Nusrat Frank, EPIFANIO        Medications this encounter:    acyclovir  10 mg/kg (Ideal) Intravenous Q8H    atropine 1%  1 drop Left Eye Daily    carboxymethylcellulose sodium  1 drop Ophthalmic QID    erythromycin   Left Eye QID    fluticasone furoate-vilanteroL  1 puff Inhalation Daily    gabapentin  200 mg Oral TID    lisinopriL  5 mg Oral Daily    prednisoLONE acetate  1 drop Left Eye BID       Allergies: is allergic to avelox  [moxifloxacin].     Social:  reports that she has never smoked. She has never used smokeless tobacco. She reports that she does not drink alcohol and does not use drugs.     ROS: As per HPI    Ocular examination/Dilated fundus examination:  Base Eye Exam       Visual Acuity (Snellen - Linear)         Right Left    Dist sc 20/25 20/70    Dist ph sc  20/50              Tonometry (Tonopen, 2:33 PM)         Right Left    Pressure 20 21              Pupils         Dark Light Shape React APD    Right 3 2 Round Brisk None    Left 6 6 Round Minimal None by reverse   Pharm dilated OS             Extraocular Movement         Right Left     Full, Ortho Full, Ortho                  Slit Lamp and Fundus Exam       External Exam         Right Left    External neurofibromas neurofibromas, vesicular scaly rash superiorly              Slit Lamp Exam         Right Left    Lids/Lashes neurofibromas erythema, upper lid thickening, unroofed vesicular lesions    Conjunctiva/Sclera White and quiet White and quiet    Cornea Clear Moderate inferior PEE's, no carrington dendrite/pseudodendrite, few inferior KP's    Anterior Chamber Deep and quiet deep, trace white cell    Iris Round and minimally reactive Round and minimally reactive    Lens IOL IOL    Anterior Vitreous PVD PVD                      Assessment/Plan:     # Herpes zoster ophthalmicus, left   # HZO anterior uveitis, left   - complaining severe pain to left side of forehead, light sensitivity, floaters, no carrington vision changes. Has been treated for 4 weeks, and per patient symptoms have improved . Per patient, ophthalmoloigst noted lesions on eye that have since resolved. Was most recently on pred forte BID  - external exam with numerous vesicular lesions superior to left eye, no obvious lesions on eyelid, negative varghese's sign  - on exam, VA 20/30, IOP good, pupil fixed at approx 3 mm, AC with trace cell, K with few inferior KP's and diffuse PEE's. No carrington  dendrite/pseudodendrites.   - dilated exam without evidence of retinal necrosis (PVD present OU, which could explain floaters)  -will start drops as below, can increase steroid frequency if inflammation does not improve   - 5/20/24: Without evidence of posterior involvement would defer to Infectious Disease regarding continuation of IV acyclovir for skin infection. Anterior uveitis can be treated with topical prednisolone acetate and atropine      Recs  - Defer to ID/medicine for length of treatment with IV acyclovir, though agree with outpatient 1g Valtrex PO TID at discharge. Intraocular inflammation and eyelids to be managed as below:  - Increase predforte to QID left eye   - Start Atropine daily left eye  - Continue erythromycin ointment QID left eye and to periocular skin lesions until resolved   - Preservative free artificial tears to both eyes QID  - warm compresses to periocular lesions TID   - please space out drops by 5 minutes to ensure adequate absorption. If applying at the same time as ointment, please apply drops prior to ointment.   - Patient has follow up with ophthalmologist in [place], can continue outpatient management there.       Davie Spann MD PGY-2  [employer] Ophthalmology Resident  05/20/2024  2:15 PM

## 2024-05-21 LAB
ALBUMIN SERPL BCP-MCNC: 3 G/DL (ref 3.5–5.2)
ALP SERPL-CCNC: 133 U/L (ref 55–135)
ALT SERPL W/O P-5'-P-CCNC: 25 U/L (ref 10–44)
ANION GAP SERPL CALC-SCNC: 13 MMOL/L (ref 8–16)
AST SERPL-CCNC: 27 U/L (ref 10–40)
BASOPHILS # BLD AUTO: 0.07 K/UL (ref 0–0.2)
BASOPHILS NFR BLD: 0.4 % (ref 0–1.9)
BILIRUB SERPL-MCNC: 1 MG/DL (ref 0.1–1)
BUN SERPL-MCNC: 7 MG/DL (ref 8–23)
CALCIUM SERPL-MCNC: 9.4 MG/DL (ref 8.7–10.5)
CHLORIDE SERPL-SCNC: 97 MMOL/L (ref 95–110)
CO2 SERPL-SCNC: 22 MMOL/L (ref 23–29)
CREAT SERPL-MCNC: 0.6 MG/DL (ref 0.5–1.4)
DIFFERENTIAL METHOD BLD: ABNORMAL
EOSINOPHIL # BLD AUTO: 0 K/UL (ref 0–0.5)
EOSINOPHIL NFR BLD: 0.1 % (ref 0–8)
ERYTHROCYTE [DISTWIDTH] IN BLOOD BY AUTOMATED COUNT: 13.9 % (ref 11.5–14.5)
EST. GFR  (NO RACE VARIABLE): >60 ML/MIN/1.73 M^2
GLUCOSE SERPL-MCNC: 94 MG/DL (ref 70–110)
HCT VFR BLD AUTO: 38.2 % (ref 37–48.5)
HGB BLD-MCNC: 12.8 G/DL (ref 12–16)
IMM GRANULOCYTES # BLD AUTO: 0.22 K/UL (ref 0–0.04)
IMM GRANULOCYTES NFR BLD AUTO: 1.4 % (ref 0–0.5)
LYMPHOCYTES # BLD AUTO: 0.6 K/UL (ref 1–4.8)
LYMPHOCYTES NFR BLD: 4 % (ref 18–48)
MAGNESIUM SERPL-MCNC: 2 MG/DL (ref 1.6–2.6)
MCH RBC QN AUTO: 29.8 PG (ref 27–31)
MCHC RBC AUTO-ENTMCNC: 33.5 G/DL (ref 32–36)
MCV RBC AUTO: 89 FL (ref 82–98)
MONOCYTES # BLD AUTO: 2 K/UL (ref 0.3–1)
MONOCYTES NFR BLD: 12.9 % (ref 4–15)
NEUTROPHILS # BLD AUTO: 12.7 K/UL (ref 1.8–7.7)
NEUTROPHILS NFR BLD: 81.2 % (ref 38–73)
NRBC BLD-RTO: 0 /100 WBC
PHOSPHATE SERPL-MCNC: 2.5 MG/DL (ref 2.7–4.5)
PLATELET # BLD AUTO: 316 K/UL (ref 150–450)
PMV BLD AUTO: 10.8 FL (ref 9.2–12.9)
POTASSIUM SERPL-SCNC: 4 MMOL/L (ref 3.5–5.1)
PROT SERPL-MCNC: 6.6 G/DL (ref 6–8.4)
RBC # BLD AUTO: 4.3 M/UL (ref 4–5.4)
SODIUM SERPL-SCNC: 132 MMOL/L (ref 136–145)
WBC # BLD AUTO: 15.64 K/UL (ref 3.9–12.7)

## 2024-05-21 PROCEDURE — 25000003 PHARM REV CODE 250: Performed by: STUDENT IN AN ORGANIZED HEALTH CARE EDUCATION/TRAINING PROGRAM

## 2024-05-21 PROCEDURE — 87150 DNA/RNA AMPLIFIED PROBE: CPT | Performed by: STUDENT IN AN ORGANIZED HEALTH CARE EDUCATION/TRAINING PROGRAM

## 2024-05-21 PROCEDURE — 80053 COMPREHEN METABOLIC PANEL: CPT | Performed by: STUDENT IN AN ORGANIZED HEALTH CARE EDUCATION/TRAINING PROGRAM

## 2024-05-21 PROCEDURE — 87186 SC STD MICRODIL/AGAR DIL: CPT | Performed by: STUDENT IN AN ORGANIZED HEALTH CARE EDUCATION/TRAINING PROGRAM

## 2024-05-21 PROCEDURE — 87040 BLOOD CULTURE FOR BACTERIA: CPT | Mod: 59 | Performed by: STUDENT IN AN ORGANIZED HEALTH CARE EDUCATION/TRAINING PROGRAM

## 2024-05-21 PROCEDURE — 85025 COMPLETE CBC W/AUTO DIFF WBC: CPT | Performed by: STUDENT IN AN ORGANIZED HEALTH CARE EDUCATION/TRAINING PROGRAM

## 2024-05-21 PROCEDURE — 25000003 PHARM REV CODE 250

## 2024-05-21 PROCEDURE — 11000001 HC ACUTE MED/SURG PRIVATE ROOM

## 2024-05-21 PROCEDURE — 83735 ASSAY OF MAGNESIUM: CPT | Performed by: STUDENT IN AN ORGANIZED HEALTH CARE EDUCATION/TRAINING PROGRAM

## 2024-05-21 PROCEDURE — 84100 ASSAY OF PHOSPHORUS: CPT | Performed by: STUDENT IN AN ORGANIZED HEALTH CARE EDUCATION/TRAINING PROGRAM

## 2024-05-21 PROCEDURE — 63600175 PHARM REV CODE 636 W HCPCS: Performed by: HOSPITALIST

## 2024-05-21 PROCEDURE — 87077 CULTURE AEROBIC IDENTIFY: CPT | Performed by: STUDENT IN AN ORGANIZED HEALTH CARE EDUCATION/TRAINING PROGRAM

## 2024-05-21 PROCEDURE — 36415 COLL VENOUS BLD VENIPUNCTURE: CPT | Performed by: STUDENT IN AN ORGANIZED HEALTH CARE EDUCATION/TRAINING PROGRAM

## 2024-05-21 PROCEDURE — 27000207 HC ISOLATION

## 2024-05-21 PROCEDURE — 63600175 PHARM REV CODE 636 W HCPCS: Performed by: STUDENT IN AN ORGANIZED HEALTH CARE EDUCATION/TRAINING PROGRAM

## 2024-05-21 PROCEDURE — 25000003 PHARM REV CODE 250: Performed by: HOSPITALIST

## 2024-05-21 RX ORDER — LISINOPRIL 10 MG/1
10 TABLET ORAL DAILY
Status: DISCONTINUED | OUTPATIENT
Start: 2024-05-21 | End: 2024-05-23

## 2024-05-21 RX ORDER — CAPSAICIN 0.03 G/100G
CREAM TOPICAL 2 TIMES DAILY
Status: DISCONTINUED | OUTPATIENT
Start: 2024-05-21 | End: 2024-05-22

## 2024-05-21 RX ORDER — CEPHALEXIN 500 MG/1
500 CAPSULE ORAL EVERY 6 HOURS
Status: DISCONTINUED | OUTPATIENT
Start: 2024-05-21 | End: 2024-05-21

## 2024-05-21 RX ORDER — CARVEDILOL 6.25 MG/1
6.25 TABLET ORAL 2 TIMES DAILY
Status: DISCONTINUED | OUTPATIENT
Start: 2024-05-21 | End: 2024-05-21

## 2024-05-21 RX ORDER — CARVEDILOL 12.5 MG/1
12.5 TABLET ORAL 2 TIMES DAILY
Status: DISCONTINUED | OUTPATIENT
Start: 2024-05-21 | End: 2024-05-23

## 2024-05-21 RX ORDER — HYDRALAZINE HYDROCHLORIDE 25 MG/1
25 TABLET, FILM COATED ORAL EVERY 6 HOURS PRN
Status: DISCONTINUED | OUTPATIENT
Start: 2024-05-21 | End: 2024-05-29 | Stop reason: HOSPADM

## 2024-05-21 RX ORDER — SODIUM,POTASSIUM PHOSPHATES 280-250MG
2 POWDER IN PACKET (EA) ORAL
Status: COMPLETED | OUTPATIENT
Start: 2024-05-21 | End: 2024-05-21

## 2024-05-21 RX ADMIN — ACYCLOVIR SODIUM 480 MG: 50 INJECTION, SOLUTION INTRAVENOUS at 08:05

## 2024-05-21 RX ADMIN — POTASSIUM & SODIUM PHOSPHATES POWDER PACK 280-160-250 MG 2 PACKET: 280-160-250 PACK at 11:05

## 2024-05-21 RX ADMIN — ATROPINE SULFATE 1 DROP: 10 SOLUTION/ DROPS OPHTHALMIC at 08:05

## 2024-05-21 RX ADMIN — POTASSIUM & SODIUM PHOSPHATES POWDER PACK 280-160-250 MG 2 PACKET: 280-160-250 PACK at 04:05

## 2024-05-21 RX ADMIN — PREDNISOLONE ACETATE 1 DROP: 10 SUSPENSION/ DROPS OPHTHALMIC at 08:05

## 2024-05-21 RX ADMIN — ACETAMINOPHEN 650 MG: 325 TABLET ORAL at 12:05

## 2024-05-21 RX ADMIN — ERYTHROMYCIN: 5 OINTMENT OPHTHALMIC at 12:05

## 2024-05-21 RX ADMIN — GABAPENTIN 200 MG: 100 CAPSULE ORAL at 08:05

## 2024-05-21 RX ADMIN — POTASSIUM & SODIUM PHOSPHATES POWDER PACK 280-160-250 MG 2 PACKET: 280-160-250 PACK at 08:05

## 2024-05-21 RX ADMIN — LORAZEPAM 1 MG: 0.5 TABLET ORAL at 08:05

## 2024-05-21 RX ADMIN — CAPSAICIN: 0.25 CREAM TOPICAL at 09:05

## 2024-05-21 RX ADMIN — OXYCODONE 5 MG: 5 TABLET ORAL at 08:05

## 2024-05-21 RX ADMIN — ERYTHROMYCIN: 5 OINTMENT OPHTHALMIC at 08:05

## 2024-05-21 RX ADMIN — CARBOXYMETHYLCELLULOSE SODIUM 1 DROP: 10 GEL OPHTHALMIC at 04:05

## 2024-05-21 RX ADMIN — ACYCLOVIR SODIUM 480 MG: 50 INJECTION, SOLUTION INTRAVENOUS at 12:05

## 2024-05-21 RX ADMIN — CEFTRIAXONE 1 G: 1 INJECTION, POWDER, FOR SOLUTION INTRAMUSCULAR; INTRAVENOUS at 01:05

## 2024-05-21 RX ADMIN — VANCOMYCIN HYDROCHLORIDE 1000 MG: 1 INJECTION, POWDER, LYOPHILIZED, FOR SOLUTION INTRAVENOUS at 02:05

## 2024-05-21 RX ADMIN — CARBOXYMETHYLCELLULOSE SODIUM 1 DROP: 10 GEL OPHTHALMIC at 08:05

## 2024-05-21 RX ADMIN — GABAPENTIN 200 MG: 100 CAPSULE ORAL at 02:05

## 2024-05-21 RX ADMIN — CARVEDILOL 6.25 MG: 6.25 TABLET, FILM COATED ORAL at 12:05

## 2024-05-21 RX ADMIN — CARBOXYMETHYLCELLULOSE SODIUM 1 DROP: 10 GEL OPHTHALMIC at 12:05

## 2024-05-21 RX ADMIN — LISINOPRIL 10 MG: 10 TABLET ORAL at 08:05

## 2024-05-21 RX ADMIN — CARVEDILOL 12.5 MG: 12.5 TABLET, FILM COATED ORAL at 08:05

## 2024-05-21 RX ADMIN — ACYCLOVIR SODIUM 480 MG: 50 INJECTION, SOLUTION INTRAVENOUS at 04:05

## 2024-05-21 RX ADMIN — OXYCODONE 5 MG: 5 TABLET ORAL at 03:05

## 2024-05-21 RX ADMIN — ERYTHROMYCIN: 5 OINTMENT OPHTHALMIC at 04:05

## 2024-05-21 NOTE — PLAN OF CARE
Problem: Adult Inpatient Plan of Care  Goal: Plan of Care Review  Outcome: Progressing  Goal: Absence of Hospital-Acquired Illness or Injury  Outcome: Progressing  Goal: Optimal Comfort and Wellbeing  Outcome: Progressing  Goal: Readiness for Transition of Care  Outcome: Progressing

## 2024-05-21 NOTE — PLAN OF CARE
"[facility] - [facility] ([place] [address])  Initial Discharge Assessment       Primary Care Provider: Oscar Shafer MD    Admission Diagnosis: Herpes zoster ophthalmicus of left eye [B02.30]    Admission Date: 5/18/2024  Expected Discharge Date: 5/24/2024    Transition of Care Barriers: (P) None    Payor: HUMANA MANAGED MEDICARE / Plan: HUMANA MEDICARE HMO / Product Type: Capitation /     Extended Emergency Contact Information  Primary Emergency Contact: Sukhwinder Dial   [place]  Mobile Phone: [phone]  Relation: Spouse    Discharge Plan A: (P) Home with family  Discharge Plan B: (P) Home      [id number] - [place], [place] - [address]  [address]  [place] [address]  Phone: [phone] Fax: [phone]      Initial Assessment (most recent)       Adult Discharge Assessment - 05/21/24 1451          Discharge Assessment    Assessment Type Discharge Planning Assessment (P)      Confirmed/corrected address, phone number and insurance Yes (P)      Confirmed Demographics Correct on Facesheet (P)      Source of Information patient;family (P)      Communicated AJ with patient/caregiver Yes (P)      Reason For Admission "shingles in eyes" (P)      People in Home spouse (P)      Do you expect to return to your current living situation? Yes (P)      Do you have help at home or someone to help you manage your care at home? Yes (P)      Who are your caregiver(s) and their phone number(s)? DialSukhwinder (Spouse)  [phone] (P)      Prior to hospitilization cognitive status: Alert/Oriented (P)      Current cognitive status: Alert/Oriented (P)      Walking or Climbing Stairs Difficulty yes (P)      Walking or Climbing Stairs ambulation difficulty, requires equipment (P)      Mobility Management cane as needed (P)      Dressing/Bathing Difficulty no (P)      Home Accessibility wheelchair accessible (P)      Home Layout Able to live on 1st floor (P)      Equipment Currently Used at Home cane, " straight (P)      Readmission within 30 days? No (P)      Patient currently being followed by outpatient case management? No (P)      Do you currently have service(s) that help you manage your care at home? No (P)      Do you take prescription medications? Yes (P)      Do you have prescription coverage? Yes (P)      Coverage HU[employer]NA MANAGED MEDICARE - HU[employer]NA MEDICARE HMO - (P)      Do you have any problems affording any of your prescribed medications? No (P)      Is the patient taking medications as prescribed? yes (P)      Who is going to help you get home at discharge? Sukhwinder Dial (Spouse)  [phone] (P)      How do you get to doctors appointments? car, drives self;family or friend will provide (P)      Are you on dialysis? No (P)      Do you take coumadin? No (P)      Discharge Plan A Home with family (P)      Discharge Plan B Home (P)      DME Needed Upon Discharge  none (P)      Discharge Plan discussed with: Patient;Spouse/sig other (P)      Name(s) and Number(s) Sukhwinder Dial (Spouse) [phone] (P)      Transition of Care Barriers None (P)         Physical Activity    On average, how many days per week do you engage in moderate to strenuous exercise (like a brisk walk)? 0 days (P)      On average, how many minutes do you engage in exercise at this level? 0 min (P)         Financial Resource Strain    How hard is it for you to pay for the very basics like food, housing, medical care, and heating? Not very hard (P)         Housing Stability    In the last 12 months, was there a time when you were not able to pay the mortgage or rent on time? No (P)      At any time in the past 12 months, were you homeless or living in a shelter (including now)? No (P)         Transportation Needs    Has the lack of transportation kept you from medical appointments, meetings, work or from getting things needed for daily living? No (P)         Food Insecurity    Within the past 12 months, you worried that your food would  run out before you got the money to buy more. Never true (P)      Within the past 12 months, the food you bought just didn't last and you didn't have money to get more. Never true (P)         Stress    Do you feel stress - tense, restless, nervous, or anxious, or unable to sleep at night because your mind is troubled all the time - these days? To some extent (P)         Social Isolation    How often do you feel lonely or isolated from those around you?  Never (P)         Alcohol Use    Q1: How often do you have a drink containing alcohol? Never (P)      Q2: How many drinks containing alcohol do you have on a typical day when you are drinking? Patient does not drink (P)      Q3: How often do you have six or more drinks on one occasion? Never (P)         Utilities    In the past 12 months has the electric, gas, oil, or water company threatened to shut off services in your home? No (P)         Health Literacy    How often do you need to have someone help you when you read instructions, pamphlets, or other written material from your doctor or pharmacy? Rarely (P)         OTHER    Name(s) of People in Home Sukhwinder Dial (Spouse) [phone] (P)                  Discharge Plan A and Plan B have been determined by review of patient's clinical status, future medical and therapeutic needs, and coverage/benefits for post-acute care in coordination with multidisciplinary team members.                       MARCELO Munoz, SW  [facility]  Case Management  Ext. [phone]

## 2024-05-21 NOTE — SUBJECTIVE & OBJECTIVE
Interval History: NAEO. Febrile and tachycardic this morning. Still endorsing significant L eye pain and difficulty seeing due to inability to open her eyes. Rash and erythema appear stable. New leukocytosis, workup pending.     Review of Systems   Eyes:  Positive for photophobia, pain, discharge and visual disturbance.   Respiratory:  Negative for cough and shortness of breath.    Gastrointestinal:  Negative for abdominal pain.   Skin:  Positive for rash.     Objective:     Vital Signs (Most Recent):  Temp: (!) 100.6 °F (38.1 °C) (05/21/24 1219)  Pulse: 108 (05/21/24 1219)  Resp: 18 (05/21/24 1211)  BP: (!) 180/81 (05/21/24 1219)  SpO2: (!) 94 % (05/21/24 1211) Vital Signs (24h Range):  Temp:  [98.3 °F (36.8 °C)-100.6 °F (38.1 °C)] 100.6 °F (38.1 °C)  Pulse:  [100-119] 108  Resp:  [17-18] 18  SpO2:  [91 %-94 %] 94 %  BP: (163-192)/() 180/81     Weight: 68.9 kg (151 lb 14.4 oz)  Body mass index is 28.7 kg/m².    Intake/Output Summary (Last 24 hours) at 5/21/2024 1329  Last data filed at 5/21/2024 0435  Gross per 24 hour   Intake 480 ml   Output --   Net 480 ml         Physical Exam  Vitals and nursing note reviewed.   Constitutional:       General: She is not in acute distress.     Appearance: She is not ill-appearing.   HENT:      Head: Normocephalic and atraumatic.      Comments:   Crusted vesicular lesions along the L V1 dermatome with lesions extending onto the L eyelid, neurofibromas present on eyelids  Erythema overlying L forehead and L eyelid stable, notable demarcation along midline of forehead     Mouth/Throat:      Mouth: Mucous membranes are dry.   Cardiovascular:      Rate and Rhythm: Regular rhythm. Tachycardia present.   Pulmonary:      Effort: Pulmonary effort is normal. No respiratory distress.   Musculoskeletal:         General: Normal range of motion.      Right lower leg: No edema.      Left lower leg: No edema.   Skin:     General: Skin is warm and dry.      Findings: Lesion and rash  present.      Comments:   Scabbed vesicular lesions on left brow  Diffuse neurofibromas   Neurological:      General: No focal deficit present.      Mental Status: She is alert and oriented to person, place, and time.   Psychiatric:         Mood and Affect: Mood normal.         Behavior: Behavior normal.             Significant Labs: All pertinent labs within the past 24 hours have been reviewed.    Significant Imaging: I have reviewed all pertinent imaging results/findings within the past 24 hours.

## 2024-05-21 NOTE — PROGRESS NOTES
[facility] - Med Surg ([address] Medicine  Progress Note    Patient Name: Jessi Dial  MRN: [id number]  Patient Class: IP- Inpatient   Admission Date: 5/18/2024  Length of Stay: 3 days  Attending Physician: Ed Jolly MD  Primary Care Provider: Oscar Shafer MD        Subjective:     Principal Problem:Herpes zoster ophthalmicus of left eye        HPI:  Jessi Dial onesimo  67 yo W w/ PMH of asthma, COPD, seizures, and neurofibromatosis who presented to [facility] ED for persistent pain tot he left side of her face and was transferred to [facility] for inpatient ophthalmology evaluation. She reports that about 5 weeks ago she started having pain b/l below her breasts that later developed into an itchy and painful foul-smelling rash. This has been improving. She presented initially to the ED on 04/23 for L-sided headache and ear pain with N/V and was treated and discharged. She then presented to [facility] ED on 04/26 for a rash with an associated burning sensation that covered the L forehead and upper eyelid. The ED provider noted that she had no Pemberton sign and fluorescein staining of the L eye showed no dendritic lesions. She was discharged with valacyclovir and prednisolone eye drops but presented to the ED again yesterday as the pain had continued to persist despite the medications. She was followed outpatient by her PCP and an ophthalmologist. The vesicular lesions have begun to crust over but she is continuing to have significant pain over her L eye and struggles to keep it open. She reports that she was started on gabapentin outpatient for additional pain control and that it initially made her drowsy and confused but that those symptoms resolved as she continued taking it. Reports vision changes due to eye discharge and pain. Denies fever, chills, hearing changes, abdominal pain, shortness of breath, cough, chest pain.    In the OSH ED, she was hypertensive and tachycardic,  afebrile. Labs notable for leukocytosis to 13.11 and Na 134. CTH without evidence of acute intracranial abnormality but notable for numerous scalp neurofibromas. Received a dose of IV acyclovir. Transferred here for further evaluation and management.     Overview/Hospital Course:  Admitted to hospital medicine for herpes zoster ophthalmicus. Started on IV acyclovir. Ophthalmology consulted and added several eye drops. ID consulted with recs to transition to PO valtrex for 14 days upon discharge. Swelling and vision with mild improvement noted on 5/20/24 but significant pain still present. Became febrile and tachycardic with a new leukocytosis on 05/21. BCx and CXR pending, starting IV abx.    Interval History: NAEO. Febrile and tachycardic this morning. Still endorsing significant L eye pain and difficulty seeing due to inability to open her eyes. Rash and erythema appear stable. New leukocytosis, workup pending.     Review of Systems   Eyes:  Positive for photophobia, pain, discharge and visual disturbance.   Respiratory:  Negative for cough and shortness of breath.    Gastrointestinal:  Negative for abdominal pain.   Skin:  Positive for rash.     Objective:     Vital Signs (Most Recent):  Temp: (!) 100.6 °F (38.1 °C) (05/21/24 1219)  Pulse: 108 (05/21/24 1219)  Resp: 18 (05/21/24 1211)  BP: (!) 180/81 (05/21/24 1219)  SpO2: (!) 94 % (05/21/24 1211) Vital Signs (24h Range):  Temp:  [98.3 °F (36.8 °C)-100.6 °F (38.1 °C)] 100.6 °F (38.1 °C)  Pulse:  [100-119] 108  Resp:  [17-18] 18  SpO2:  [91 %-94 %] 94 %  BP: (163-192)/() 180/81     Weight: 68.9 kg (151 lb 14.4 oz)  Body mass index is 28.7 kg/m².    Intake/Output Summary (Last 24 hours) at 5/21/2024 1329  Last data filed at 5/21/2024 0435  Gross per 24 hour   Intake 480 ml   Output --   Net 480 ml         Physical Exam  Vitals and nursing note reviewed.   Constitutional:       General: She is not in acute distress.     Appearance: She is not ill-appearing.    HENT:      Head: Normocephalic and atraumatic.      Comments:   Crusted vesicular lesions along the L V1 dermatome with lesions extending onto the L eyelid, neurofibromas present on eyelids  Erythema overlying L forehead and L eyelid stable, notable demarcation along midline of forehead     Mouth/Throat:      Mouth: Mucous membranes are dry.   Cardiovascular:      Rate and Rhythm: Regular rhythm. Tachycardia present.   Pulmonary:      Effort: Pulmonary effort is normal. No respiratory distress.   Musculoskeletal:         General: Normal range of motion.      Right lower leg: No edema.      Left lower leg: No edema.   Skin:     General: Skin is warm and dry.      Findings: Lesion and rash present.      Comments:   Scabbed vesicular lesions on left brow  Diffuse neurofibromas   Neurological:      General: No focal deficit present.      Mental Status: She is alert and oriented to person, place, and time.   Psychiatric:         Mood and Affect: Mood normal.         Behavior: Behavior normal.             Significant Labs: All pertinent labs within the past 24 hours have been reviewed.    Significant Imaging: I have reviewed all pertinent imaging results/findings within the past 24 hours.    Assessment/Plan:      * Herpes zoster ophthalmicus of left eye  3-4 week history of vesicular lesions and pain extending along the L V1 dermatome including the L eyelid. She reports L eye discharge and pain that impacts her vision. She was seen in the ED on 04/26 for this and followed outpatient by her PCP and ophthalmologist. Was taking Valtrex and using steroid eye drops, but the pain and rash persisted. Transferred here for inpatient ophthalmology evaluation. Vision impaired by discharge and presence of rash and neurofibromas on L eyelid, but otherwise no vision loss. Became febrile and tachycardic with new leukocytosis on 05/21.    - F/u BCx and CXR  - Starting IV vanc + rocephin  - Ophthalmology consulted, recommended  additional eye drop medications   - ID consulted with recs to continue IV acyclovir 10mg/kg q8h & transition to PO valtrex 1gm tid   - Gabapentin 200mg TID, Tylenol and oxy 5mg prn for pain  - F/u with ophthalmology within 2 weeks of discharge    Elevated BP without diagnosis of hypertension  - Lisinopril started for BP management while inpatient, increased dose today  - Trend BP and labs    Anxiety  Continuing home ativan      Neurofibromatosis  Hx of neurofibromatosis      Asthma  Continue home inhaler        VTE Risk Mitigation (From admission, onward)           Ordered     IP VTE LOW RISK PATIENT  Once         05/18/24 1505     Place sequential compression device  Until discontinued         05/18/24 1505                    Discharge Planning   JA: 5/24/2024     Code Status: Full Code   Is the patient medically ready for discharge?:     Reason for patient still in hospital (select all that apply): Laboratory test, Treatment, and Imaging                     Duyen Joy MD  Department of Hospital Medicine   [facility] - Med Surg ([place])

## 2024-05-21 NOTE — PLAN OF CARE
Problem: Adult Inpatient Plan of Care  Goal: Plan of Care Review  Outcome: Progressing  Goal: Patient-Specific Goal (Individualized)  Outcome: Progressing  Goal: Absence of Hospital-Acquired Illness or Injury  Outcome: Progressing  Goal: Optimal Comfort and Wellbeing  Outcome: Progressing  Goal: Readiness for Transition of Care  Outcome: Progressing     Problem: Skin Injury Risk Increased  Goal: Skin Health and Integrity  Outcome: Progressing     Problem: Infection  Goal: Absence of Infection Signs and Symptoms  Outcome: Progressing     Problem: Pain Acute  Goal: Optimal Pain Control and Function  Outcome: Progressing   Pt AAO X 4; able to express needs.  Pain managed with PO PRN meds.  Eye drops & IV ABX given as ordered.   Lesions all over body.  Eyes closed shut.   Safety maintained.  Bed in low position,  call  light in reach.

## 2024-05-21 NOTE — ASSESSMENT & PLAN NOTE
3-4 week history of vesicular lesions and pain extending along the L V1 dermatome including the L eyelid. She reports L eye discharge and pain that impacts her vision. She was seen in the ED on 04/26 for this and followed outpatient by her PCP and ophthalmologist. Was taking Valtrex and using steroid eye drops, but the pain and rash persisted. Transferred here for inpatient ophthalmology evaluation. Vision impaired by discharge and presence of rash and neurofibromas on L eyelid, but otherwise no vision loss. Became febrile and tachycardic with new leukocytosis on 05/21.    - F/u BCx and CXR  - Starting IV vanc + rocephin  - Ophthalmology consulted, recommended additional eye drop medications   - ID consulted with recs to continue IV acyclovir 10mg/kg q8h & transition to PO valtrex 1gm tid   - Gabapentin 200mg TID, Tylenol and oxy 5mg prn for pain  - F/u with ophthalmology within 2 weeks of discharge

## 2024-05-21 NOTE — PROGRESS NOTES
Pharmacokinetic Initial Assessment: IV Vancomycin    Assessment/Plan:  Begin vancomycin 1000 mg q12h (~ 16.9 mg/kg)   Desired empiric serum trough concentration is 10 to 20 mcg/mL  Draw vancomycin trough level 60 min prior to fourth dose on 5/23 at approximately 0200  Pharmacy will continue to follow and monitor vancomycin.      Please contact pharmacy at extension [phone] with any questions regarding this assessment.     Thank you for the consult,   Vanessaher Ramsey       Patient brief summary:  Jessi Dial is a 68 y.o. female initiated on antimicrobial therapy with IV Vancomycin for treatment of suspected skin & soft tissue infection    Drug Allergies:   Review of patient's allergies indicates:   Allergen Reactions    Avelox [moxifloxacin] Swelling       Actual Body Weight:   68.9 kg    Renal Function:   Estimated Creatinine Clearance: 80.7 mL/min (based on SCr of 0.6 mg/dL).,     Dialysis Method (if applicable):  N/A    CBC (last 72 hours):  Recent Labs   Lab Result Units 05/18/24 1834 05/19/24  0621 05/20/24  0603 05/21/24  0400   WBC K/uL 10.79 8.03 9.57 15.64*   Hemoglobin g/dL 12.4 11.6* 11.6* 12.8   Hematocrit % 38.1 35.8* 35.9* 38.2   Platelets K/uL 320 300 291 316   Gran % % 79.7* 75.1* 76.2* 81.2*   Lymph % % 7.0* 9.8* 7.0* 4.0*   Mono % % 11.5 12.3 14.6 12.9   Eosinophil % % 0.6 1.5 0.8 0.1   Basophil % % 0.6 0.6 0.6 0.4   Differential Method  Automated Automated Automated Automated       Metabolic Panel (last 72 hours):  Recent Labs   Lab Result Units 05/18/24  1834 05/19/24  0621 05/20/24  0603 05/21/24  0400   Sodium mmol/L 135* 132* 131* 132*   Potassium mmol/L 3.7 3.5 3.4* 4.0   Chloride mmol/L 100 100 97 97   CO2 mmol/L 23 22* 27 22*   Glucose mg/dL 104 85 85 94   BUN mg/dL 11 11 7* 7*   Creatinine mg/dL 0.7 0.6 0.6 0.6   Albumin g/dL 3.1* 2.9* 2.8* 3.0*   Total Bilirubin mg/dL 0.5 0.5 0.5 1.0   Alkaline Phosphatase U/L 92 89 100 133   AST U/L 12 11 13 27   ALT U/L 7* 7* 9* 25   Magnesium mg/dL 1.9  "1.9 1.7 2.0   Phosphorus mg/dL 3.0 3.0 2.5* 2.5*       Drug levels (last 3 results):  No results for input(s): "VANCOMYCINRA", "VANCORANDOM", "VANCOMYCINPE", "VANCOPEAK", "VANCOMYCINTR", "VANCOTROUGH" in the last 72 hours.    Microbiologic Results:  Microbiology Results (last 7 days)       Procedure Component Value Units Date/Time    Blood culture [[id number]] Collected: 05/21/24 1150    Order Status: Sent Specimen: Blood Updated: 05/21/24 1204    Blood culture [[id number]] Collected: 05/21/24 1151    Order Status: Sent Specimen: Blood Updated: 05/21/24 1204            "

## 2024-05-21 NOTE — PROGRESS NOTES
Progress Note  Ophthalmology Service    Date: 05/21/2024    CC: blurry vision, new floaters intermittently for 1 month     HPI: Jessi Dial is a 68 y.o. female who has been having left sided facial rash and light sensitivity for 4 weeks. She has been treated as an outpatient by her PCP and ophthalmologist. She was previously on steroid drops and was taken off of them, but presented to her ophthalmologist yesterday 05/17 and was told to restart steroid drops BID. She also stated that she did have lesions on her eye that went away (per her eye doctor). She also states she was taking valtrex which had been decreased to BID from TID. She endorses difficulty opening eyelids but denies gross visual changes when eyes are open. Endorsing light sensitivity and scratchiness.      Interval: No new complaints. Still with irritation/foreign body sensation beneath left eyelids. States feeling more fatigued.      POH: CEIOL OU, plus above      Gtts: most recently pred forte BID, left eye    Family Hx: Denies family history of glaucoma, macular degeneration, or blindness. family history is not on file.     PMHx:  has a past medical history of Asthma, COPD (chronic obstructive pulmonary disease), Neurofibromatosis, and Seizures.     PSurgHx:  has no past surgical history on file.     Home Medications:   Prior to Admission medications    Medication Sig Start Date End Date Taking? Authorizing Provider   acetaminophen (TYLENOL) 325 MG tablet Take 2 tablets (650 mg total) by mouth every 4 (four) hours as needed. 2/25/18   Sukhwinder Oliva MD   budesonide-formoterol 80-4.5 mcg (SYMBICORT) 80-4.5 mcg/actuation HFAA Inhale 2 puffs into the lungs 2 (two) times daily. Controller 10/5/17 10/5/18  Ximena Bell MD   fluticasone propionate (FLONASE) 50 mcg/actuation nasal spray 1 spray (50 mcg total) by Each Nostril route 2 (two) times daily as needed for Rhinitis. 4/23/24   Stefan Burr DO   gabapentin (NEURONTIN)  100 MG capsule Take 100 mg by mouth 3 (three) times daily.    Provider, Historical   levocetirizine (XYZAL) 5 MG tablet Take 1 tablet (5 mg total) by mouth every evening. 4/23/24 4/23/25  Stefan Burr,    LORazepam (ATIVAN) 1 MG tablet Take 1 mg by mouth nightly as needed for Anxiety.    Provider, Historical   losartan (COZAAR) 50 MG tablet Take 50 mg by mouth once daily.    Provider, Historical   prednisoLONE acetate (PRED FORTE) 1 % DrpS Place 1 drop into the left eye 4 (four) times daily.    Provider, Historical   albuterol 90 mcg/actuation inhaler Inhale 1-2 puffs into the lungs every 6 (six) hours as needed for Wheezing. Rescue 10/5/17 5/20/24  Ximena Bell MD   aspirin 325 MG tablet Take 1 tablet (325 mg total) by mouth 2 (two) times daily. 2/25/18 5/20/24  Sukhwinder Oliva MD   butalbital-acetaminophen-caffeine -40 mg (FIORICET, ESGIC) -40 mg per tablet Take 1 tablet by mouth every 4 (four) hours as needed for Pain. 4/23/24 5/20/24  Stefan Burr DO   naproxen (NAPROSYN) 500 MG tablet Take 1 tablet (500 mg total) by mouth 2 (two) times daily with meals. 7/2/21 5/20/24  Yvonne Garcia PA-C   oxyCODONE (ROXICODONE) 5 MG immediate release tablet Take 1 tablet (5 mg total) by mouth every 4 (four) hours as needed for Pain. 2/25/18 5/20/24  Sukhwinder Oliva MD   senna-docusate 8.6-50 mg (PERICOLACE) 8.6-50 mg per tablet Take 1 tablet by mouth 2 (two) times daily. 2/25/18 5/20/24  Sukhwinder Oliva MD   valACYclovir (VALTREX) 1000 MG tablet Take 1 tablet (1,000 mg total) by mouth 3 (three) times daily. for 10 days 4/26/24 5/20/24  Frank, Nusrat, NP        Medications this encounter:    acyclovir  10 mg/kg (Ideal) Intravenous Q8H    atropine 1%  1 drop Left Eye Daily    capsaicin   Topical (Top) BID    carboxymethylcellulose sodium  1 drop Ophthalmic QID    carvediloL  12.5 mg Oral BID    cefTRIAXone (Rocephin) IV (PEDS and ADULTS)  1 g Intravenous  Q24H    erythromycin   Left Eye QID    fluticasone furoate-vilanteroL  1 puff Inhalation Daily    gabapentin  200 mg Oral TID    lisinopriL  10 mg Oral Daily    potassium, sodium phosphates  2 packet Oral QID (AC & HS)    prednisoLONE acetate  1 drop Left Eye BID    vancomycin (VANCOCIN) IV (PEDS and ADULTS)  15 mg/kg Intravenous Q12H       Allergies: is allergic to avelox [moxifloxacin].     Social:  reports that she has never smoked. She has never used smokeless tobacco. She reports that she does not drink alcohol and does not use drugs.     ROS: As per HPI    Ocular examination/Dilated fundus examination:  Base Eye Exam       Visual Acuity (Snellen - Linear)         Right Left    Dist sc 20/50 20/30              Tonometry (Tonopen, 5:03 PM)         Right Left    Pressure 15 19              Pupils         Dark Light Shape React APD    Right 4 2 Round Brisk None    Left 5 5 Round Minimal None                  Slit Lamp and Fundus Exam       External Exam         Right Left    External neurofibromas neurofibromas, vesicular scaly rash superiorly              Slit Lamp Exam         Right Left    Lids/Lashes neurofibromas erythema, upper lid thickening, unroofed vesicular lesions    Conjunctiva/Sclera White and quiet White and quiet    Cornea Clear Inferior PEE's, no carrington dendrite/pseudodendrite, few central and inferior KP's    Anterior Chamber Deep and quiet deep, flare    Iris Round and minimally reactive Round and minimally reactive    Lens IOL IOL    Anterior Vitreous PVD PVD              Fundus Exam         Right Left    Disc  Pink and sharp    C/D Ratio  0.2    Macula  Flat and attached, pigment changes    Vessels  Normal caliber and crossings    Periphery  Flat with no holes, tears, or detachments                      Assessment/Plan:     # Herpes zoster ophthalmicus, left   # HZO anterior uveitis, left   - complaining severe pain to left side of forehead, light sensitivity, floaters, no carrington vision changes.  Has been treated for 4 weeks, and per patient symptoms have improved . Per patient, ophthalmoloigst noted lesions on eye that have since resolved. Was most recently on pred forte BID  - external exam with numerous vesicular lesions superior to left eye, no obvious lesions on eyelid, negative varghese's sign  - on exam, VA 20/30, IOP good, pupil fixed at approx 3 mm, AC with trace cell, K with few inferior KP's and diffuse PEE's. No carrington dendrite/pseudodendrites.   - dilated exam without evidence of retinal necrosis (PVD present OU, which could explain floaters)  -will start drops as below, can increase steroid frequency if inflammation does not improve   - Without evidence of posterior involvement would defer to Infectious Disease regarding continuation of IV acyclovir for skin infection. Anterior uveitis can be treated with topical prednisolone acetate and atropine. Continue treatment of open ulcerations on eyelid with erythromycin ointment.   - 5/21/24: Spiked a temp today (5/21/24) and increased WBC, hospital medicine following. Eyelid and periorbital skin with open and ulcerated lesions, no carrington evidence of preseptal cellulitis on examination, but could consider CT Orbits/Max/Face w/ contrast if concern for possible skin/soft tissue infection. EOMI remain stable w/o pain, no APD by reverse in left eye, vision stable, posterior exam normal.      Recs  - Defer to ID/medicine for length of treatment with IV acyclovir, though agree with outpatient 1g Valtrex PO TID at discharge. Intraocular inflammation and eyelids to be managed as below:  - Increase predforte to 6x per day left eye   - Continue Atropine daily left eye  - Continue erythromycin ointment QID left eye and to periocular skin lesions until resolved   - Preservative free artificial tears to both eyes QID  - warm compresses to periocular lesions TID   - please space out drops by 5 minutes to ensure adequate absorption. If applying at the same time as  ointment, please apply drops prior to ointment.   - Patient has follow up with ophthalmologist in [place], can continue outpatient management there.       Davie Spann MD PGY-2  [employer] Ophthalmology Resident  05/21/2024  2:15 PM

## 2024-05-22 PROBLEM — B95.61 MSSA BACTEREMIA: Status: ACTIVE | Noted: 2024-05-22

## 2024-05-22 PROBLEM — R78.81 MSSA BACTEREMIA: Status: ACTIVE | Noted: 2024-05-22

## 2024-05-22 LAB
ALBUMIN SERPL BCP-MCNC: 2.6 G/DL (ref 3.5–5.2)
ALP SERPL-CCNC: 142 U/L (ref 55–135)
ALT SERPL W/O P-5'-P-CCNC: 25 U/L (ref 10–44)
ANION GAP SERPL CALC-SCNC: 10 MMOL/L (ref 8–16)
AST SERPL-CCNC: 21 U/L (ref 10–40)
BASOPHILS # BLD AUTO: 0.08 K/UL (ref 0–0.2)
BASOPHILS NFR BLD: 0.6 % (ref 0–1.9)
BILIRUB SERPL-MCNC: 0.7 MG/DL (ref 0.1–1)
BUN SERPL-MCNC: 9 MG/DL (ref 8–23)
CALCIUM SERPL-MCNC: 9.2 MG/DL (ref 8.7–10.5)
CHLORIDE SERPL-SCNC: 93 MMOL/L (ref 95–110)
CO2 SERPL-SCNC: 27 MMOL/L (ref 23–29)
CREAT SERPL-MCNC: 0.6 MG/DL (ref 0.5–1.4)
DIFFERENTIAL METHOD BLD: ABNORMAL
EOSINOPHIL # BLD AUTO: 0 K/UL (ref 0–0.5)
EOSINOPHIL NFR BLD: 0.1 % (ref 0–8)
ERYTHROCYTE [DISTWIDTH] IN BLOOD BY AUTOMATED COUNT: 14 % (ref 11.5–14.5)
EST. GFR  (NO RACE VARIABLE): >60 ML/MIN/1.73 M^2
GLUCOSE SERPL-MCNC: 109 MG/DL (ref 70–110)
HCT VFR BLD AUTO: 35.9 % (ref 37–48.5)
HGB BLD-MCNC: 12.3 G/DL (ref 12–16)
IMM GRANULOCYTES # BLD AUTO: 0.36 K/UL (ref 0–0.04)
IMM GRANULOCYTES NFR BLD AUTO: 2.6 % (ref 0–0.5)
LACTATE SERPL-SCNC: 0.8 MMOL/L (ref 0.5–2.2)
LYMPHOCYTES # BLD AUTO: 0.8 K/UL (ref 1–4.8)
LYMPHOCYTES NFR BLD: 5.5 % (ref 18–48)
MAGNESIUM SERPL-MCNC: 1.6 MG/DL (ref 1.6–2.6)
MCH RBC QN AUTO: 29.5 PG (ref 27–31)
MCHC RBC AUTO-ENTMCNC: 34.3 G/DL (ref 32–36)
MCV RBC AUTO: 86 FL (ref 82–98)
MONOCYTES # BLD AUTO: 2.3 K/UL (ref 0.3–1)
MONOCYTES NFR BLD: 16.8 % (ref 4–15)
MRSA ID BY PCR: NEGATIVE
NEUTROPHILS # BLD AUTO: 10.3 K/UL (ref 1.8–7.7)
NEUTROPHILS NFR BLD: 74.4 % (ref 38–73)
NRBC BLD-RTO: 0 /100 WBC
PHOSPHATE SERPL-MCNC: 2.6 MG/DL (ref 2.7–4.5)
PLATELET # BLD AUTO: 302 K/UL (ref 150–450)
PMV BLD AUTO: 10.2 FL (ref 9.2–12.9)
POTASSIUM SERPL-SCNC: 3.7 MMOL/L (ref 3.5–5.1)
PROT SERPL-MCNC: 6.3 G/DL (ref 6–8.4)
RBC # BLD AUTO: 4.17 M/UL (ref 4–5.4)
SODIUM SERPL-SCNC: 130 MMOL/L (ref 136–145)
STAPH AUREUS ID BY PCR: POSITIVE
WBC # BLD AUTO: 13.79 K/UL (ref 3.9–12.7)

## 2024-05-22 PROCEDURE — 85025 COMPLETE CBC W/AUTO DIFF WBC: CPT | Performed by: STUDENT IN AN ORGANIZED HEALTH CARE EDUCATION/TRAINING PROGRAM

## 2024-05-22 PROCEDURE — 25000003 PHARM REV CODE 250

## 2024-05-22 PROCEDURE — 11000001 HC ACUTE MED/SURG PRIVATE ROOM

## 2024-05-22 PROCEDURE — 25000003 PHARM REV CODE 250: Performed by: STUDENT IN AN ORGANIZED HEALTH CARE EDUCATION/TRAINING PROGRAM

## 2024-05-22 PROCEDURE — 25000003 PHARM REV CODE 250: Performed by: HOSPITALIST

## 2024-05-22 PROCEDURE — 63600175 PHARM REV CODE 636 W HCPCS: Performed by: STUDENT IN AN ORGANIZED HEALTH CARE EDUCATION/TRAINING PROGRAM

## 2024-05-22 PROCEDURE — 99223 1ST HOSP IP/OBS HIGH 75: CPT | Mod: GC,,, | Performed by: INTERNAL MEDICINE

## 2024-05-22 PROCEDURE — 83605 ASSAY OF LACTIC ACID: CPT | Performed by: STUDENT IN AN ORGANIZED HEALTH CARE EDUCATION/TRAINING PROGRAM

## 2024-05-22 PROCEDURE — 83735 ASSAY OF MAGNESIUM: CPT | Performed by: STUDENT IN AN ORGANIZED HEALTH CARE EDUCATION/TRAINING PROGRAM

## 2024-05-22 PROCEDURE — 63600175 PHARM REV CODE 636 W HCPCS: Performed by: HOSPITALIST

## 2024-05-22 PROCEDURE — 84100 ASSAY OF PHOSPHORUS: CPT | Performed by: STUDENT IN AN ORGANIZED HEALTH CARE EDUCATION/TRAINING PROGRAM

## 2024-05-22 PROCEDURE — 27000207 HC ISOLATION

## 2024-05-22 PROCEDURE — 80053 COMPREHEN METABOLIC PANEL: CPT | Performed by: STUDENT IN AN ORGANIZED HEALTH CARE EDUCATION/TRAINING PROGRAM

## 2024-05-22 RX ORDER — PREDNISOLONE ACETATE 10 MG/ML
1 SUSPENSION/ DROPS OPHTHALMIC
Status: DISCONTINUED | OUTPATIENT
Start: 2024-05-22 | End: 2024-05-24

## 2024-05-22 RX ORDER — SODIUM,POTASSIUM PHOSPHATES 280-250MG
2 POWDER IN PACKET (EA) ORAL ONCE
Status: COMPLETED | OUTPATIENT
Start: 2024-05-22 | End: 2024-05-22

## 2024-05-22 RX ORDER — ACYCLOVIR 50 MG/G
OINTMENT TOPICAL
Status: DISCONTINUED | OUTPATIENT
Start: 2024-05-22 | End: 2024-05-23

## 2024-05-22 RX ORDER — DIPHENHYDRAMINE HCL 25 MG
25 CAPSULE ORAL EVERY 4 HOURS PRN
Status: DISCONTINUED | OUTPATIENT
Start: 2024-05-22 | End: 2024-05-29 | Stop reason: HOSPADM

## 2024-05-22 RX ORDER — DIPHENHYDRAMINE HCL 25 MG
25 CAPSULE ORAL ONCE
Status: COMPLETED | OUTPATIENT
Start: 2024-05-22 | End: 2024-05-22

## 2024-05-22 RX ADMIN — ERYTHROMYCIN: 5 OINTMENT OPHTHALMIC at 12:05

## 2024-05-22 RX ADMIN — CARBOXYMETHYLCELLULOSE SODIUM 1 DROP: 10 GEL OPHTHALMIC at 04:05

## 2024-05-22 RX ADMIN — ERYTHROMYCIN: 5 OINTMENT OPHTHALMIC at 04:05

## 2024-05-22 RX ADMIN — ACYCLOVIR SODIUM 480 MG: 50 INJECTION, SOLUTION INTRAVENOUS at 01:05

## 2024-05-22 RX ADMIN — CARBOXYMETHYLCELLULOSE SODIUM 1 DROP: 10 GEL OPHTHALMIC at 12:05

## 2024-05-22 RX ADMIN — ACYCLOVIR: 50 OINTMENT TOPICAL at 05:05

## 2024-05-22 RX ADMIN — CARBOXYMETHYLCELLULOSE SODIUM 1 DROP: 10 GEL OPHTHALMIC at 08:05

## 2024-05-22 RX ADMIN — CEFAZOLIN 2 G: 2 INJECTION, POWDER, FOR SOLUTION INTRAMUSCULAR; INTRAVENOUS at 08:05

## 2024-05-22 RX ADMIN — ACYCLOVIR SODIUM 480 MG: 50 INJECTION, SOLUTION INTRAVENOUS at 08:05

## 2024-05-22 RX ADMIN — PREDNISOLONE ACETATE 1 DROP: 10 SUSPENSION/ DROPS OPHTHALMIC at 09:05

## 2024-05-22 RX ADMIN — ACYCLOVIR SODIUM 480 MG: 50 INJECTION, SOLUTION INTRAVENOUS at 04:05

## 2024-05-22 RX ADMIN — CARVEDILOL 12.5 MG: 12.5 TABLET, FILM COATED ORAL at 08:05

## 2024-05-22 RX ADMIN — ERYTHROMYCIN: 5 OINTMENT OPHTHALMIC at 08:05

## 2024-05-22 RX ADMIN — PREDNISOLONE ACETATE 1 DROP: 10 SUSPENSION/ DROPS OPHTHALMIC at 02:05

## 2024-05-22 RX ADMIN — ACYCLOVIR: 50 OINTMENT TOPICAL at 11:05

## 2024-05-22 RX ADMIN — CAPSAICIN: 0.25 CREAM TOPICAL at 08:05

## 2024-05-22 RX ADMIN — GABAPENTIN 200 MG: 100 CAPSULE ORAL at 08:05

## 2024-05-22 RX ADMIN — PREDNISOLONE ACETATE 1 DROP: 10 SUSPENSION/ DROPS OPHTHALMIC at 05:05

## 2024-05-22 RX ADMIN — POTASSIUM & SODIUM PHOSPHATES POWDER PACK 280-160-250 MG 2 PACKET: 280-160-250 PACK at 09:05

## 2024-05-22 RX ADMIN — PREDNISOLONE ACETATE 1 DROP: 10 SUSPENSION/ DROPS OPHTHALMIC at 08:05

## 2024-05-22 RX ADMIN — VANCOMYCIN HYDROCHLORIDE 1000 MG: 1 INJECTION, POWDER, LYOPHILIZED, FOR SOLUTION INTRAVENOUS at 03:05

## 2024-05-22 RX ADMIN — ATROPINE SULFATE 1 DROP: 10 SOLUTION/ DROPS OPHTHALMIC at 08:05

## 2024-05-22 RX ADMIN — GABAPENTIN 200 MG: 100 CAPSULE ORAL at 02:05

## 2024-05-22 RX ADMIN — ACYCLOVIR: 50 OINTMENT TOPICAL at 09:05

## 2024-05-22 RX ADMIN — LISINOPRIL 10 MG: 10 TABLET ORAL at 08:05

## 2024-05-22 RX ADMIN — DIPHENHYDRAMINE HYDROCHLORIDE 25 MG: 25 CAPSULE ORAL at 11:05

## 2024-05-22 RX ADMIN — ACYCLOVIR: 50 OINTMENT TOPICAL at 02:05

## 2024-05-22 RX ADMIN — CEFAZOLIN 2 G: 2 INJECTION, POWDER, FOR SOLUTION INTRAMUSCULAR; INTRAVENOUS at 11:05

## 2024-05-22 NOTE — PROGRESS NOTES
Therapy with vancomycin complete and/or consult discontinued by provider.  Pharmacy will sign off, please re-consult as needed.    Vanessa Ramsey, PharmD, BCPS  Clinical Pharmacist - Internal Medicine   [id number]

## 2024-05-22 NOTE — ASSESSMENT & PLAN NOTE
On 05/21, noted to have tachycardia, fever, and new leukocytosis. CXR without acute intracranial process. BCx growing GPCs, rapid ID showing MSSA.    - Started on vanc + rocephin, deescalated to ancef today  - ID consulted for further recs  - F/u BCx speciation and sensitivities  - F/u CT maxillofacial w/ contrast

## 2024-05-22 NOTE — ASSESSMENT & PLAN NOTE
3-4 week history of vesicular lesions and pain extending along the L V1 dermatome including the L eyelid. She reports L eye discharge and pain that impacts her vision. She was seen in the ED on 04/26 for this and followed outpatient by her PCP and ophthalmologist. Was taking Valtrex and using steroid eye drops, but the pain and rash persisted. Transferred here for inpatient ophthalmology evaluation. Vision impaired by discharge and presence of rash and neurofibromas on L eyelid, but otherwise no vision loss. Became febrile and tachycardic with new leukocytosis on 05/21.    - Increasing prednisolone eye drop to 6x daily  - Ophthalmology consulted, recommended additional eye drop medications   - Adding benadryl and topical acyclovir for pruritis  - ID consulted with recs to continue IV acyclovir 10mg/kg q8h & transition to PO valtrex 1gm tid   - Gabapentin 200mg TID, Tylenol and oxy 5mg prn for pain  - F/u with ophthalmology within 2 weeks of discharge

## 2024-05-22 NOTE — SUBJECTIVE & OBJECTIVE
Interval History: NAEO.     Review of Systems   HENT:          Pruritic scalp   Eyes:  Positive for photophobia, pain, discharge and visual disturbance.   Respiratory:  Negative for cough and shortness of breath.    Gastrointestinal:  Negative for abdominal pain.   Skin:  Positive for rash.   Neurological:  Negative for dizziness and light-headedness.     Objective:     Vital Signs (Most Recent):  Temp: 98.6 °F (37 °C) (05/22/24 1122)  Pulse: 91 (05/22/24 1122)  Resp: 18 (05/22/24 0844)  BP: (!) 147/73 (05/22/24 1122)  SpO2: (!) 92 % (05/22/24 1122) Vital Signs (24h Range):  Temp:  [98.3 °F (36.8 °C)-100.6 °F (38.1 °C)] 98.6 °F (37 °C)  Pulse:  [] 91  Resp:  [17-18] 18  SpO2:  [92 %-98 %] 92 %  BP: (147-180)/(73-89) 147/73     Weight: 68.9 kg (151 lb 14.4 oz)  Body mass index is 28.7 kg/m².    Intake/Output Summary (Last 24 hours) at 5/22/2024 1144  Last data filed at 5/22/2024 0729  Gross per 24 hour   Intake 1730.1 ml   Output --   Net 1730.1 ml         Physical Exam  Vitals and nursing note reviewed.   Constitutional:       General: She is not in acute distress.  HENT:      Head: Normocephalic and atraumatic.      Comments:   Crusted vesicular lesions along the L V1 dermatome with lesions extending onto the L eyelid, neurofibromas present on eyelids  Erythema overlying L forehead and L eyelid stable, notable demarcation along midline of forehead     Mouth/Throat:      Mouth: Mucous membranes are dry.   Eyes:      Extraocular Movements: Extraocular movements intact.      Conjunctiva/sclera: Conjunctivae normal.   Cardiovascular:      Rate and Rhythm: Normal rate and regular rhythm.   Pulmonary:      Effort: Pulmonary effort is normal. No respiratory distress.   Musculoskeletal:         General: Normal range of motion.      Right lower leg: No edema.      Left lower leg: No edema.   Skin:     General: Skin is warm and dry.      Findings: Lesion and rash present.      Comments:   Scabbed vesicular lesions on  left brow  Diffuse neurofibromas    Neurological:      General: No focal deficit present.      Mental Status: She is alert and oriented to person, place, and time.   Psychiatric:         Mood and Affect: Mood normal.         Behavior: Behavior normal.             Significant Labs: All pertinent labs within the past 24 hours have been reviewed.    Significant Imaging: I have reviewed all pertinent imaging results/findings within the past 24 hours.

## 2024-05-22 NOTE — ASSESSMENT & PLAN NOTE
Developed fever of 100.6 on 5/21, blood cultures drawn and growing MSSA. Source likely skin given VZV rash that patient reports scratching, vs possible superficial thrombophlebitis at R antecubital previous IV site. Currently on cefazolin.     Recommendations  Continue cefazolin  Obtain repeat blood cultures  Obtain TTE  Obtain US of RUE

## 2024-05-22 NOTE — PLAN OF CARE
Problem: Adult Inpatient Plan of Care  Goal: Plan of Care Review  5/22/2024 1713 by Glendy Hedrick RN  Outcome: Progressing  5/22/2024 1713 by Glendy Hedrick RN  Outcome: Progressing  Goal: Absence of Hospital-Acquired Illness or Injury  5/22/2024 1713 by Glendy Hedrick RN  Outcome: Progressing  5/22/2024 1713 by Glendy Hedrick RN  Outcome: Progressing  Goal: Optimal Comfort and Wellbeing  5/22/2024 1713 by Glendy Hedrick RN  Outcome: Progressing  5/22/2024 1713 by Glendy Hedrick RN  Outcome: Progressing  Goal: Readiness for Transition of Care  5/22/2024 1713 by Glendy Hedrick RN  Outcome: Progressing  5/22/2024 1713 by Glendy Hedrick RN  Outcome: Progressing

## 2024-05-22 NOTE — ASSESSMENT & PLAN NOTE
68F with h/o asthma, COPD, seizures, and neurofibromatosis admitted 5/18 as transfer from [facility] for optho exam, being treated for herpes ophthalmicus. Pt not known to be immunocompromised.     Recommendations:   Continue IV acyclovir while inpatient, can switch to PO valtrex 1g TID on discharge  Duration atleast 14d, but will need ophtho follow up to determine final duration depending on resolution of lesions on eye exam

## 2024-05-22 NOTE — PROGRESS NOTES
Progress Note  Ophthalmology Service    Date: 05/22/2024    CC: blurry vision, new floaters intermittently for 1 month     HPI: Jessi Dial is a 68 y.o. female who has been having left sided facial rash and light sensitivity for 4 weeks. She has been treated as an outpatient by her PCP and ophthalmologist. She was previously on steroid drops and was taken off of them, but presented to her ophthalmologist yesterday 05/17 and was told to restart steroid drops BID. She also stated that she did have lesions on her eye that went away (per her eye doctor). She also states she was taking valtrex which had been decreased to BID from TID. She endorses difficulty opening eyelids but denies gross visual changes when eyes are open. Endorsing light sensitivity and scratchiness.      Interval: No new complaints. Still with irritation/foreign body sensation beneath left eyelids. States feeling more fatigued.      POH: CEIOL OU, plus above      Gtts: most recently pred forte BID, left eye    Family Hx: Denies family history of glaucoma, macular degeneration, or blindness. family history is not on file.     PMHx:  has a past medical history of Asthma, COPD (chronic obstructive pulmonary disease), Neurofibromatosis, and Seizures.     PSurgHx:  has no past surgical history on file.     Home Medications:   Prior to Admission medications    Medication Sig Start Date End Date Taking? Authorizing Provider   acetaminophen (TYLENOL) 325 MG tablet Take 2 tablets (650 mg total) by mouth every 4 (four) hours as needed. 2/25/18   Sukhwinder Oliva MD   budesonide-formoterol 80-4.5 mcg (SYMBICORT) 80-4.5 mcg/actuation HFAA Inhale 2 puffs into the lungs 2 (two) times daily. Controller 10/5/17 10/5/18  Ximena Bell MD   fluticasone propionate (FLONASE) 50 mcg/actuation nasal spray 1 spray (50 mcg total) by Each Nostril route 2 (two) times daily as needed for Rhinitis. 4/23/24   Stefan Burr DO   gabapentin (NEURONTIN)  100 MG capsule Take 100 mg by mouth 3 (three) times daily.    Provider, Historical   levocetirizine (XYZAL) 5 MG tablet Take 1 tablet (5 mg total) by mouth every evening. 4/23/24 4/23/25  Stefan Burr,    LORazepam (ATIVAN) 1 MG tablet Take 1 mg by mouth nightly as needed for Anxiety.    Provider, Historical   losartan (COZAAR) 50 MG tablet Take 50 mg by mouth once daily.    Provider, Historical   prednisoLONE acetate (PRED FORTE) 1 % DrpS Place 1 drop into the left eye 4 (four) times daily.    Provider, Historical   albuterol 90 mcg/actuation inhaler Inhale 1-2 puffs into the lungs every 6 (six) hours as needed for Wheezing. Rescue 10/5/17 5/20/24  Ximena Bell MD   aspirin 325 MG tablet Take 1 tablet (325 mg total) by mouth 2 (two) times daily. 2/25/18 5/20/24  Sukhwinder Oliva MD   butalbital-acetaminophen-caffeine -40 mg (FIORICET, ESGIC) -40 mg per tablet Take 1 tablet by mouth every 4 (four) hours as needed for Pain. 4/23/24 5/20/24  Stefan Burr DO   naproxen (NAPROSYN) 500 MG tablet Take 1 tablet (500 mg total) by mouth 2 (two) times daily with meals. 7/2/21 5/20/24  Yvonne Garcia PA-C   oxyCODONE (ROXICODONE) 5 MG immediate release tablet Take 1 tablet (5 mg total) by mouth every 4 (four) hours as needed for Pain. 2/25/18 5/20/24  Sukhwinder Oliva MD   senna-docusate 8.6-50 mg (PERICOLACE) 8.6-50 mg per tablet Take 1 tablet by mouth 2 (two) times daily. 2/25/18 5/20/24  Sukhwinder Oliva MD   valACYclovir (VALTREX) 1000 MG tablet Take 1 tablet (1,000 mg total) by mouth 3 (three) times daily. for 10 days 4/26/24 5/20/24  Frank, Nusrat, NP        Medications this encounter:    acyclovir  10 mg/kg (Ideal) Intravenous Q8H    acyclovir 5%   Topical (Top) 6x Daily    atropine 1%  1 drop Left Eye Daily    carboxymethylcellulose sodium  1 drop Ophthalmic QID    carvediloL  12.5 mg Oral BID    ceFAZolin (Ancef) IV (PEDS and ADULTS)  2 g  Intravenous Q8H    erythromycin   Left Eye QID    fluticasone furoate-vilanteroL  1 puff Inhalation Daily    gabapentin  200 mg Oral TID    lisinopriL  10 mg Oral Daily    prednisoLONE acetate  1 drop Left Eye 6x Daily       Allergies: is allergic to avelox [moxifloxacin].     Social:  reports that she has never smoked. She has never used smokeless tobacco. She reports that she does not drink alcohol and does not use drugs.     ROS: As per HPI    Ocular examination/Dilated fundus examination:  Base Eye Exam       Visual Acuity (Snellen - Linear)         Right Left    Dist sc 20/30 20/70 -1              Tonometry (Tonopen, 12:33 PM)         Right Left    Pressure 8 9              Pupils         Dark Light Shape React APD    Right 2 1 Round Brisk None    Left 6 6 Round Minimal None by reverse              Extraocular Movement         Right Left     Full, Ortho Full, Ortho                  Slit Lamp and Fundus Exam       External Exam         Right Left    External neurofibromas neurofibromas, vesicular scaly rash superiorly              Slit Lamp Exam         Right Left    Lids/Lashes neurofibromas erythema, upper lid thickening, unroofed vesicular lesions    Conjunctiva/Sclera White and quiet White and quiet    Cornea Clear Inferior PEE's, no carrington dendrite/pseudodendrite, few central and inferior KP's    Anterior Chamber Deep and quiet deep, flare    Iris Round and minimally reactive, lisch nodules Round and minimally reactive, lisch nodules    Lens IOL IOL    Anterior Vitreous PVD PVD              Fundus Exam         Right Left    Disc  Pink and sharp    C/D Ratio  0.2    Macula  Flat and attached, pigment changes    Vessels  Normal caliber and crossings    Periphery  Flat with no holes, tears, or detachments                      Assessment/Plan:     # Herpes zoster ophthalmicus, left   # HZO anterior uveitis, left   - complaining severe pain to left side of forehead, light sensitivity, floaters, no carrington vision  changes. Has been treated for 4 weeks, and per patient symptoms have improved . Per patient, ophthalmoloigst noted lesions on eye that have since resolved. Was most recently on pred forte BID  - external exam with numerous vesicular lesions superior to left eye, no obvious lesions on eyelid, negative varghese's sign  - on exam, VA 20/30, IOP good, pupil fixed at approx 3 mm, AC with trace cell, K with few inferior KP's and diffuse PEE's. No carrington dendrite/pseudodendrites.   - dilated exam without evidence of retinal necrosis (PVD present OU, which could explain floaters)  -will start drops as below, can increase steroid frequency if inflammation does not improve   - Without evidence of posterior involvement would defer to Infectious Disease regarding continuation of IV acyclovir for skin infection. Anterior uveitis can be treated with topical prednisolone acetate and atropine. Continue treatment of open ulcerations on eyelid with erythromycin ointment.   - 5/22/24: Pending bacteremia work-up with medicine (+ MSSA). Pending CT Max/Face. No evidence of significant preseptal cellulitis. EOMI remain stable w/o pain, no APD by reverse in left eye, vision stable, posterior exam normal.      Recs  - Defer to ID/medicine for length of treatment with IV acyclovir, though agree with outpatient 1g Valtrex PO TID at discharge. Intraocular inflammation and eyelids to be managed as below:  - Continue predforte to 6x per day left eye   - Continue Atropine daily left eye  - Continue erythromycin ointment QID left eye and to periocular skin lesions until resolved   - Preservative free artificial tears to both eyes QID  - Warm compresses to periocular lesions TID   - Please space out drops by 5 minutes to ensure adequate absorption. If applying at the same time as ointment, please apply drops prior to ointment.   - Patient has follow up with ophthalmologist in [place], can continue outpatient management there.       Davie  MD Zana PGY-2  [employer] Ophthalmology Resident  05/22/2024  2:15 PM

## 2024-05-22 NOTE — PROGRESS NOTES
[facility] - Med Surg ([address] Medicine  Progress Note    Patient Name: Jessi Dial  MRN: [id number]  Patient Class: IP- Inpatient   Admission Date: 5/18/2024  Length of Stay: 4 days  Attending Physician: Ed Jolly MD  Primary Care Provider: Oscar Shafer MD        Subjective:     Principal Problem:Herpes zoster ophthalmicus of left eye        HPI:  Jessi Dial onesimo  69 yo W w/ PMH of asthma, COPD, seizures, and neurofibromatosis who presented to [facility] ED for persistent pain tot he left side of her face and was transferred to [facility] for inpatient ophthalmology evaluation. She reports that about 5 weeks ago she started having pain b/l below her breasts that later developed into an itchy and painful foul-smelling rash. This has been improving. She presented initially to the ED on 04/23 for L-sided headache and ear pain with N/V and was treated and discharged. She then presented to [facility] ED on 04/26 for a rash with an associated burning sensation that covered the L forehead and upper eyelid. The ED provider noted that she had no Pemberton sign and fluorescein staining of the L eye showed no dendritic lesions. She was discharged with valacyclovir and prednisolone eye drops but presented to the ED again yesterday as the pain had continued to persist despite the medications. She was followed outpatient by her PCP and an ophthalmologist. The vesicular lesions have begun to crust over but she is continuing to have significant pain over her L eye and struggles to keep it open. She reports that she was started on gabapentin outpatient for additional pain control and that it initially made her drowsy and confused but that those symptoms resolved as she continued taking it. Reports vision changes due to eye discharge and pain. Denies fever, chills, hearing changes, abdominal pain, shortness of breath, cough, chest pain.    In the OSH ED, she was hypertensive and tachycardic,  afebrile. Labs notable for leukocytosis to 13.11 and Na 134. CTH without evidence of acute intracranial abnormality but notable for numerous scalp neurofibromas. Received a dose of IV acyclovir. Transferred here for further evaluation and management.     Overview/Hospital Course:  Admitted to hospital medicine for herpes zoster ophthalmicus. Started on IV acyclovir. Ophthalmology consulted and added several eye drops. ID consulted with recs to transition to PO valtrex for 14 days upon discharge. Swelling and vision with mild improvement noted on 5/20/24 but significant pain still present. Became febrile and tachycardic with a new leukocytosis on 05/21, started on vanc + rocephin. CXR without an acute intrathoracic process. BCx growing GPCs and rapid ID showing MSSA, deescalated to ancef. ID consulted for additional recs. CT maxillofacial pending.    Interval History: NAEO.     Review of Systems   HENT:          Pruritic scalp   Eyes:  Positive for photophobia, pain, discharge and visual disturbance.   Respiratory:  Negative for cough and shortness of breath.    Gastrointestinal:  Negative for abdominal pain.   Skin:  Positive for rash.   Neurological:  Negative for dizziness and light-headedness.     Objective:     Vital Signs (Most Recent):  Temp: 98.6 °F (37 °C) (05/22/24 1122)  Pulse: 91 (05/22/24 1122)  Resp: 18 (05/22/24 0844)  BP: (!) 147/73 (05/22/24 1122)  SpO2: (!) 92 % (05/22/24 1122) Vital Signs (24h Range):  Temp:  [98.3 °F (36.8 °C)-100.6 °F (38.1 °C)] 98.6 °F (37 °C)  Pulse:  [] 91  Resp:  [17-18] 18  SpO2:  [92 %-98 %] 92 %  BP: (147-180)/(73-89) 147/73     Weight: 68.9 kg (151 lb 14.4 oz)  Body mass index is 28.7 kg/m².    Intake/Output Summary (Last 24 hours) at 5/22/2024 1144  Last data filed at 5/22/2024 0729  Gross per 24 hour   Intake 1730.1 ml   Output --   Net 1730.1 ml         Physical Exam  Vitals and nursing note reviewed.   Constitutional:       General: She is not in acute  distress.  HENT:      Head: Normocephalic and atraumatic.      Comments:   Crusted vesicular lesions along the L V1 dermatome with lesions extending onto the L eyelid, neurofibromas present on eyelids  Erythema overlying L forehead and L eyelid stable, notable demarcation along midline of forehead     Mouth/Throat:      Mouth: Mucous membranes are dry.   Eyes:      Extraocular Movements: Extraocular movements intact.      Conjunctiva/sclera: Conjunctivae normal.   Cardiovascular:      Rate and Rhythm: Normal rate and regular rhythm.   Pulmonary:      Effort: Pulmonary effort is normal. No respiratory distress.   Musculoskeletal:         General: Normal range of motion.      Right lower leg: No edema.      Left lower leg: No edema.   Skin:     General: Skin is warm and dry.      Findings: Lesion and rash present.      Comments:   Scabbed vesicular lesions on left brow  Diffuse neurofibromas    Neurological:      General: No focal deficit present.      Mental Status: She is alert and oriented to person, place, and time.   Psychiatric:         Mood and Affect: Mood normal.         Behavior: Behavior normal.             Significant Labs: All pertinent labs within the past 24 hours have been reviewed.    Significant Imaging: I have reviewed all pertinent imaging results/findings within the past 24 hours.    Assessment/Plan:      * Herpes zoster ophthalmicus of left eye  3-4 week history of vesicular lesions and pain extending along the L V1 dermatome including the L eyelid. She reports L eye discharge and pain that impacts her vision. She was seen in the ED on 04/26 for this and followed outpatient by her PCP and ophthalmologist. Was taking Valtrex and using steroid eye drops, but the pain and rash persisted. Transferred here for inpatient ophthalmology evaluation. Vision impaired by discharge and presence of rash and neurofibromas on L eyelid, but otherwise no vision loss. Became febrile and tachycardic with new  leukocytosis on 05/21.    - Increasing prednisolone eye drop to 6x daily  - Ophthalmology consulted, recommended additional eye drop medications   - Adding benadryl and topical acyclovir for pruritis  - ID consulted with recs to continue IV acyclovir 10mg/kg q8h & transition to PO valtrex 1gm tid   - Gabapentin 200mg TID, Tylenol and oxy 5mg prn for pain  - F/u with ophthalmology within 2 weeks of discharge    Gram-positive cocci bacteremia  On 05/21, noted to have tachycardia, fever, and new leukocytosis. CXR without acute intracranial process. BCx growing GPCs, rapid ID showing MSSA.    - Started on vanc + rocephin, deescalated to ancef today  - ID consulted for further recs  - F/u BCx speciation and sensitivities  - F/u CT maxillofacial w/ contrast    Elevated BP without diagnosis of hypertension  - Lisinopril started for BP management while inpatient, increased dose today  - Trend BP and labs    Anxiety  Continuing home ativan      Neurofibromatosis  Hx of neurofibromatosis      Asthma  Continue home inhaler        VTE Risk Mitigation (From admission, onward)           Ordered     IP VTE LOW RISK PATIENT  Once         05/18/24 1505     Place sequential compression device  Until discontinued         05/18/24 1505                    Discharge Planning   AJ: 5/24/2024     Code Status: Full Code   Is the patient medically ready for discharge?:     Reason for patient still in hospital (select all that apply): Treatment, Imaging, and Consult recommendations  Discharge Plan A: Home with family                  Duyen Joy MD  Department of Hospital Medicine   [facility] - Med Surg ([place]-16)

## 2024-05-22 NOTE — PLAN OF CARE
Problem: Adult Inpatient Plan of Care  Goal: Plan of Care Review  Outcome: Progressing  Goal: Patient-Specific Goal (Individualized)  Outcome: Progressing  Goal: Absence of Hospital-Acquired Illness or Injury  Outcome: Progressing  Goal: Optimal Comfort and Wellbeing  Outcome: Progressing  Goal: Readiness for Transition of Care  Outcome: Progressing     Problem: Skin Injury Risk Increased  Goal: Skin Health and Integrity  Outcome: Progressing     Problem: Infection  Goal: Absence of Infection Signs and Symptoms  Outcome: Progressing     Problem: Pain Acute  Goal: Optimal Pain Control and Function  Outcome: Progressing   Pt AAO X 4; able to express needs.  C/o discomfort and itching eye pain.  IV ABX given  as ordered.  Vanc. Trough on 5/23 at 2am.  + Blood cultures   reported from lab that were collected on 5/21/24 at 11:58 in the aerobic bottles  indicating gram + cocci in clusters resembling staff.  Reported to on call this morning  via pager.  NO new orders at this time.  Safety maintained.  Bed in low position,  call  light in reach.

## 2024-05-23 LAB
ALBUMIN SERPL BCP-MCNC: 2.5 G/DL (ref 3.5–5.2)
ALP SERPL-CCNC: 170 U/L (ref 55–135)
ALT SERPL W/O P-5'-P-CCNC: 25 U/L (ref 10–44)
ANION GAP SERPL CALC-SCNC: 9 MMOL/L (ref 8–16)
AST SERPL-CCNC: 29 U/L (ref 10–40)
BASOPHILS # BLD AUTO: 0.07 K/UL (ref 0–0.2)
BASOPHILS NFR BLD: 0.6 % (ref 0–1.9)
BILIRUB SERPL-MCNC: 0.5 MG/DL (ref 0.1–1)
BUN SERPL-MCNC: 7 MG/DL (ref 8–23)
CALCIUM SERPL-MCNC: 9 MG/DL (ref 8.7–10.5)
CHLORIDE SERPL-SCNC: 94 MMOL/L (ref 95–110)
CO2 SERPL-SCNC: 29 MMOL/L (ref 23–29)
CREAT SERPL-MCNC: 0.6 MG/DL (ref 0.5–1.4)
DIFFERENTIAL METHOD BLD: ABNORMAL
DOHLE BOD BLD QL SMEAR: PRESENT
EOSINOPHIL # BLD AUTO: 0 K/UL (ref 0–0.5)
EOSINOPHIL NFR BLD: 0.2 % (ref 0–8)
ERYTHROCYTE [DISTWIDTH] IN BLOOD BY AUTOMATED COUNT: 14.2 % (ref 11.5–14.5)
EST. GFR  (NO RACE VARIABLE): >60 ML/MIN/1.73 M^2
GLUCOSE SERPL-MCNC: 113 MG/DL (ref 70–110)
HCT VFR BLD AUTO: 35.4 % (ref 37–48.5)
HGB BLD-MCNC: 11.8 G/DL (ref 12–16)
IMM GRANULOCYTES # BLD AUTO: 0.54 K/UL (ref 0–0.04)
IMM GRANULOCYTES NFR BLD AUTO: 4.4 % (ref 0–0.5)
LYMPHOCYTES # BLD AUTO: 0.9 K/UL (ref 1–4.8)
LYMPHOCYTES NFR BLD: 7.4 % (ref 18–48)
MAGNESIUM SERPL-MCNC: 1.9 MG/DL (ref 1.6–2.6)
MCH RBC QN AUTO: 29.2 PG (ref 27–31)
MCHC RBC AUTO-ENTMCNC: 33.3 G/DL (ref 32–36)
MCV RBC AUTO: 88 FL (ref 82–98)
MONOCYTES # BLD AUTO: 2.3 K/UL (ref 0.3–1)
MONOCYTES NFR BLD: 18.7 % (ref 4–15)
NEUTROPHILS # BLD AUTO: 8.4 K/UL (ref 1.8–7.7)
NEUTROPHILS NFR BLD: 68.7 % (ref 38–73)
NRBC BLD-RTO: 0 /100 WBC
PHOSPHATE SERPL-MCNC: 2.9 MG/DL (ref 2.7–4.5)
PLATELET # BLD AUTO: 309 K/UL (ref 150–450)
PLATELET BLD QL SMEAR: ABNORMAL
PMV BLD AUTO: 10.8 FL (ref 9.2–12.9)
POTASSIUM SERPL-SCNC: 3.6 MMOL/L (ref 3.5–5.1)
PROT SERPL-MCNC: 6.3 G/DL (ref 6–8.4)
RBC # BLD AUTO: 4.04 M/UL (ref 4–5.4)
SODIUM SERPL-SCNC: 132 MMOL/L (ref 136–145)
SPHEROCYTES BLD QL SMEAR: ABNORMAL
WBC # BLD AUTO: 12.22 K/UL (ref 3.9–12.7)

## 2024-05-23 PROCEDURE — 36415 COLL VENOUS BLD VENIPUNCTURE: CPT | Performed by: STUDENT IN AN ORGANIZED HEALTH CARE EDUCATION/TRAINING PROGRAM

## 2024-05-23 PROCEDURE — 99900035 HC TECH TIME PER 15 MIN (STAT)

## 2024-05-23 PROCEDURE — 27000207 HC ISOLATION

## 2024-05-23 PROCEDURE — 63600175 PHARM REV CODE 636 W HCPCS: Performed by: STUDENT IN AN ORGANIZED HEALTH CARE EDUCATION/TRAINING PROGRAM

## 2024-05-23 PROCEDURE — 25000242 PHARM REV CODE 250 ALT 637 W/ HCPCS: Performed by: STUDENT IN AN ORGANIZED HEALTH CARE EDUCATION/TRAINING PROGRAM

## 2024-05-23 PROCEDURE — 11000001 HC ACUTE MED/SURG PRIVATE ROOM

## 2024-05-23 PROCEDURE — 87040 BLOOD CULTURE FOR BACTERIA: CPT | Mod: 59 | Performed by: STUDENT IN AN ORGANIZED HEALTH CARE EDUCATION/TRAINING PROGRAM

## 2024-05-23 PROCEDURE — 83735 ASSAY OF MAGNESIUM: CPT | Performed by: STUDENT IN AN ORGANIZED HEALTH CARE EDUCATION/TRAINING PROGRAM

## 2024-05-23 PROCEDURE — 25000003 PHARM REV CODE 250: Performed by: STUDENT IN AN ORGANIZED HEALTH CARE EDUCATION/TRAINING PROGRAM

## 2024-05-23 PROCEDURE — 25000003 PHARM REV CODE 250: Performed by: HOSPITALIST

## 2024-05-23 PROCEDURE — 99233 SBSQ HOSP IP/OBS HIGH 50: CPT | Mod: ,,, | Performed by: INTERNAL MEDICINE

## 2024-05-23 PROCEDURE — 63600175 PHARM REV CODE 636 W HCPCS: Performed by: HOSPITALIST

## 2024-05-23 PROCEDURE — 25000003 PHARM REV CODE 250

## 2024-05-23 PROCEDURE — 85025 COMPLETE CBC W/AUTO DIFF WBC: CPT | Performed by: STUDENT IN AN ORGANIZED HEALTH CARE EDUCATION/TRAINING PROGRAM

## 2024-05-23 PROCEDURE — 80053 COMPREHEN METABOLIC PANEL: CPT | Performed by: STUDENT IN AN ORGANIZED HEALTH CARE EDUCATION/TRAINING PROGRAM

## 2024-05-23 PROCEDURE — 84100 ASSAY OF PHOSPHORUS: CPT | Performed by: STUDENT IN AN ORGANIZED HEALTH CARE EDUCATION/TRAINING PROGRAM

## 2024-05-23 RX ORDER — GABAPENTIN 300 MG/1
300 CAPSULE ORAL 3 TIMES DAILY
Status: DISCONTINUED | OUTPATIENT
Start: 2024-05-23 | End: 2024-05-23

## 2024-05-23 RX ORDER — FLUTICASONE PROPIONATE 50 MCG
2 SPRAY, SUSPENSION (ML) NASAL DAILY
Status: DISCONTINUED | OUTPATIENT
Start: 2024-05-23 | End: 2024-05-29 | Stop reason: HOSPADM

## 2024-05-23 RX ORDER — CARVEDILOL 25 MG/1
25 TABLET ORAL 2 TIMES DAILY
Status: DISCONTINUED | OUTPATIENT
Start: 2024-05-23 | End: 2024-05-29 | Stop reason: HOSPADM

## 2024-05-23 RX ORDER — CETIRIZINE HYDROCHLORIDE 5 MG/1
5 TABLET ORAL DAILY
Status: DISCONTINUED | OUTPATIENT
Start: 2024-05-23 | End: 2024-05-29 | Stop reason: HOSPADM

## 2024-05-23 RX ORDER — ENOXAPARIN SODIUM 100 MG/ML
40 INJECTION SUBCUTANEOUS EVERY 24 HOURS
Status: DISCONTINUED | OUTPATIENT
Start: 2024-05-23 | End: 2024-05-29 | Stop reason: HOSPADM

## 2024-05-23 RX ORDER — LISINOPRIL 20 MG/1
20 TABLET ORAL DAILY
Status: DISCONTINUED | OUTPATIENT
Start: 2024-05-23 | End: 2024-05-28

## 2024-05-23 RX ORDER — GABAPENTIN 300 MG/1
300 CAPSULE ORAL NIGHTLY
Status: DISCONTINUED | OUTPATIENT
Start: 2024-05-23 | End: 2024-05-24

## 2024-05-23 RX ORDER — GABAPENTIN 300 MG/1
300 CAPSULE ORAL 3 TIMES DAILY
Status: DISCONTINUED | OUTPATIENT
Start: 2024-05-23 | End: 2024-05-24

## 2024-05-23 RX ORDER — GABAPENTIN 300 MG/1
600 CAPSULE ORAL NIGHTLY
Status: DISCONTINUED | OUTPATIENT
Start: 2024-05-23 | End: 2024-05-23

## 2024-05-23 RX ORDER — OXYMETAZOLINE HCL 0.05 %
2 SPRAY, NON-AEROSOL (ML) NASAL EVERY 8 HOURS PRN
Status: DISPENSED | OUTPATIENT
Start: 2024-05-23 | End: 2024-05-26

## 2024-05-23 RX ADMIN — CEFAZOLIN 2 G: 2 INJECTION, POWDER, FOR SOLUTION INTRAMUSCULAR; INTRAVENOUS at 03:05

## 2024-05-23 RX ADMIN — PREDNISOLONE ACETATE 1 DROP: 10 SUSPENSION/ DROPS OPHTHALMIC at 11:05

## 2024-05-23 RX ADMIN — CARBOXYMETHYLCELLULOSE SODIUM 1 DROP: 10 GEL OPHTHALMIC at 01:05

## 2024-05-23 RX ADMIN — ACYCLOVIR: 50 OINTMENT TOPICAL at 03:05

## 2024-05-23 RX ADMIN — ATROPINE SULFATE 1 DROP: 10 SOLUTION/ DROPS OPHTHALMIC at 08:05

## 2024-05-23 RX ADMIN — OXYCODONE 5 MG: 5 TABLET ORAL at 09:05

## 2024-05-23 RX ADMIN — LORAZEPAM 1 MG: 0.5 TABLET ORAL at 08:05

## 2024-05-23 RX ADMIN — FLUTICASONE PROPIONATE 100 MCG: 50 SPRAY, METERED NASAL at 11:05

## 2024-05-23 RX ADMIN — CARBOXYMETHYLCELLULOSE SODIUM 1 DROP: 10 GEL OPHTHALMIC at 05:05

## 2024-05-23 RX ADMIN — ERYTHROMYCIN: 5 OINTMENT OPHTHALMIC at 08:05

## 2024-05-23 RX ADMIN — ACYCLOVIR SODIUM 480 MG: 50 INJECTION, SOLUTION INTRAVENOUS at 04:05

## 2024-05-23 RX ADMIN — CETIRIZINE HYDROCHLORIDE 5 MG: 5 TABLET, FILM COATED ORAL at 11:05

## 2024-05-23 RX ADMIN — PREDNISOLONE ACETATE 1 DROP: 10 SUSPENSION/ DROPS OPHTHALMIC at 01:05

## 2024-05-23 RX ADMIN — CEFAZOLIN 2 G: 2 INJECTION, POWDER, FOR SOLUTION INTRAMUSCULAR; INTRAVENOUS at 11:05

## 2024-05-23 RX ADMIN — ERYTHROMYCIN: 5 OINTMENT OPHTHALMIC at 05:05

## 2024-05-23 RX ADMIN — PREDNISOLONE ACETATE 1 DROP: 10 SUSPENSION/ DROPS OPHTHALMIC at 05:05

## 2024-05-23 RX ADMIN — CARVEDILOL 25 MG: 25 TABLET, FILM COATED ORAL at 08:05

## 2024-05-23 RX ADMIN — GABAPENTIN 300 MG: 300 CAPSULE ORAL at 10:05

## 2024-05-23 RX ADMIN — PREDNISOLONE ACETATE 1 DROP: 10 SUSPENSION/ DROPS OPHTHALMIC at 06:05

## 2024-05-23 RX ADMIN — HYDRALAZINE HYDROCHLORIDE 25 MG: 25 TABLET ORAL at 06:05

## 2024-05-23 RX ADMIN — ERYTHROMYCIN: 5 OINTMENT OPHTHALMIC at 01:05

## 2024-05-23 RX ADMIN — GABAPENTIN 300 MG: 300 CAPSULE ORAL at 05:05

## 2024-05-23 RX ADMIN — DIPHENHYDRAMINE HYDROCHLORIDE 25 MG: 25 CAPSULE ORAL at 08:05

## 2024-05-23 RX ADMIN — CEFAZOLIN 2 G: 2 INJECTION, POWDER, FOR SOLUTION INTRAMUSCULAR; INTRAVENOUS at 08:05

## 2024-05-23 RX ADMIN — ENOXAPARIN SODIUM 40 MG: 40 INJECTION SUBCUTANEOUS at 05:05

## 2024-05-23 RX ADMIN — CARBOXYMETHYLCELLULOSE SODIUM 1 DROP: 10 GEL OPHTHALMIC at 08:05

## 2024-05-23 RX ADMIN — ACYCLOVIR: 50 OINTMENT TOPICAL at 09:05

## 2024-05-23 RX ADMIN — LISINOPRIL 20 MG: 20 TABLET ORAL at 09:05

## 2024-05-23 RX ADMIN — PREDNISOLONE ACETATE 1 DROP: 10 SUSPENSION/ DROPS OPHTHALMIC at 03:05

## 2024-05-23 RX ADMIN — ACYCLOVIR: 50 OINTMENT TOPICAL at 06:05

## 2024-05-23 RX ADMIN — GABAPENTIN 200 MG: 100 CAPSULE ORAL at 08:05

## 2024-05-23 RX ADMIN — CARBOXYMETHYLCELLULOSE SODIUM 1 DROP: 10 GEL OPHTHALMIC at 11:05

## 2024-05-23 RX ADMIN — ACYCLOVIR SODIUM 480 MG: 50 INJECTION, SOLUTION INTRAVENOUS at 12:05

## 2024-05-23 RX ADMIN — ACETAMINOPHEN 650 MG: 325 TABLET ORAL at 08:05

## 2024-05-23 NOTE — ASSESSMENT & PLAN NOTE
3-4 week history of vesicular lesions and pain extending along the L V1 dermatome including the L eyelid. She reports L eye discharge and pain that impacts her vision. She was seen in the ED on 04/26 for this and followed outpatient by her PCP and ophthalmologist. Was taking Valtrex and using steroid eye drops, but the pain and rash persisted. Transferred here for inpatient ophthalmology evaluation. Vision impaired by discharge and presence of rash and neurofibromas on L eyelid, but otherwise no vision loss. Became febrile and tachycardic with new leukocytosis on 05/21.    - Ophthalmology consulted, recommended additional eye drop medications - prednisolone 6x/day, atropine, erythromycin, and artifical tears  - Benadryl and topical acyclovir for pruritis  - ID consulted with recs to continue IV acyclovir 10mg/kg q8h & transition to PO valtrex 1gm tid   - Increasing gabapentin to 300/300/600mg, Tylenol and oxy 5mg prn for pain  - F/u with ophthalmology within 2 weeks of discharge

## 2024-05-23 NOTE — PROGRESS NOTES
[facility] - Med Surg ([address] Medicine  Progress Note    Patient Name: Jessi Dial  MRN: [id number]  Patient Class: IP- Inpatient   Admission Date: 5/18/2024  Length of Stay: 5 days  Attending Physician: Hernando Amos MD  Primary Care Provider: Oscar Shafer MD        Subjective:     Principal Problem:Herpes zoster ophthalmicus of left eye        HPI:  Jessi Dial onesimo  67 yo W w/ PMH of asthma, COPD, seizures, and neurofibromatosis who presented to [facility] ED for persistent pain tot he left side of her face and was transferred to [facility] for inpatient ophthalmology evaluation. She reports that about 5 weeks ago she started having pain b/l below her breasts that later developed into an itchy and painful foul-smelling rash. This has been improving. She presented initially to the ED on 04/23 for L-sided headache and ear pain with N/V and was treated and discharged. She then presented to [facility] ED on 04/26 for a rash with an associated burning sensation that covered the L forehead and upper eyelid. The ED provider noted that she had no Pemberton sign and fluorescein staining of the L eye showed no dendritic lesions. She was discharged with valacyclovir and prednisolone eye drops but presented to the ED again yesterday as the pain had continued to persist despite the medications. She was followed outpatient by her PCP and an ophthalmologist. The vesicular lesions have begun to crust over but she is continuing to have significant pain over her L eye and struggles to keep it open. She reports that she was started on gabapentin outpatient for additional pain control and that it initially made her drowsy and confused but that those symptoms resolved as she continued taking it. Reports vision changes due to eye discharge and pain. Denies fever, chills, hearing changes, abdominal pain, shortness of breath, cough, chest pain.    In the O[facility] ED, she was hypertensive and tachycardic,  afebrile. Labs notable for leukocytosis to 13.11 and Na 134. CTH without evidence of acute intracranial abnormality but notable for numerous scalp neurofibromas. Received a dose of IV acyclovir. Transferred here for further evaluation and management.     Overview/Hospital Course:  Admitted to hospital medicine for herpes zoster ophthalmicus. Started on IV acyclovir. Ophthalmology consulted and added several eye drops. ID consulted with recs to transition to PO valtrex for 14 days upon discharge. Swelling and vision with mild improvement noted on 5/20/24 but significant pain still present. Became febrile and tachycardic with a new leukocytosis on 05/21, started on vanc + rocephin. CXR without an acute intrathoracic process. BCx growing GPCs and rapid ID showing MSSA, deescalated to ancef. CT maxillofacial showing mild paranasal sinus disease with aerated secretions in L sphenoid sinus and innumerable cutaneous nodules consistent with neurofibromatosis. ID re-consulted for new bacteremia. TTE, RUE US, and repeat BCx pending.    Interval History: NAEO. AF, hypertensive overnight requiring a dose of hydralazine. Reports improved pruritus with addition of benadryl. Complaining of nasal congestion. Still endorsing eye pain and nasal pain.    Review of Systems   Eyes:  Positive for photophobia, pain, discharge and visual disturbance.   Respiratory:  Negative for cough and shortness of breath.    Gastrointestinal:  Negative for abdominal pain, nausea and vomiting.   Skin:  Positive for rash.   Neurological:  Negative for dizziness and light-headedness.     Objective:     Vital Signs (Most Recent):  Temp: 98.8 °F (37.1 °C) (05/23/24 0810)  Pulse: 87 (05/23/24 0810)  Resp: 18 (05/23/24 0614)  BP: (!) 175/83 (05/23/24 0810)  SpO2: (!) 91 % (05/23/24 0810) Vital Signs (24h Range):  Temp:  [98.3 °F (36.8 °C)-98.9 °F (37.2 °C)] 98.8 °F (37.1 °C)  Pulse:  [87-93] 87  Resp:  [18] 18  SpO2:  [91 %-97 %] 91 %  BP: (147-189)/(73-92)  175/83     Weight: 68.9 kg (151 lb 14.4 oz)  Body mass index is 28.7 kg/m².  No intake or output data in the 24 hours ending 05/23/24 0848      Physical Exam  Vitals and nursing note reviewed.   Constitutional:       General: She is not in acute distress.  HENT:      Head: Normocephalic and atraumatic.      Comments:   Crusted vesicular lesions along the L V1 dermatome with lesions extending onto the L eyelid, neurofibromas present on eyelids  Erythema overlying L forehead and L eyelid stable, notable demarcation along midline of forehead     Mouth/Throat:      Mouth: Mucous membranes are dry.   Eyes:      Comments: Increased tearing L eye   Cardiovascular:      Rate and Rhythm: Normal rate and regular rhythm.   Pulmonary:      Effort: Pulmonary effort is normal. No respiratory distress.   Musculoskeletal:         General: Normal range of motion.      Right lower leg: No edema.      Left lower leg: No edema.   Skin:     General: Skin is warm and dry.      Comments:   Scabbed vesicular lesions on left brow  Diffuse neurofibromas     Neurological:      General: No focal deficit present.      Mental Status: She is alert and oriented to person, place, and time.   Psychiatric:         Mood and Affect: Mood normal.         Behavior: Behavior normal.             Significant Labs: All pertinent labs within the past 24 hours have been reviewed.    Significant Imaging: I have reviewed all pertinent imaging results/findings within the past 24 hours.    Assessment/Plan:      * Herpes zoster ophthalmicus of left eye  3-4 week history of vesicular lesions and pain extending along the L V1 dermatome including the L eyelid. She reports L eye discharge and pain that impacts her vision. She was seen in the ED on 04/26 for this and followed outpatient by her PCP and ophthalmologist. Was taking Valtrex and using steroid eye drops, but the pain and rash persisted. Transferred here for inpatient ophthalmology evaluation. Vision impaired  by discharge and presence of rash and neurofibromas on L eyelid, but otherwise no vision loss. Became febrile and tachycardic with new leukocytosis on 05/21.    - Ophthalmology consulted, recommended additional eye drop medications - prednisolone 6x/day, atropine, erythromycin, and artifical tears  - Benadryl and topical acyclovir for pruritis  - ID consulted with recs to continue IV acyclovir 10mg/kg q8h & transition to PO valtrex 1gm tid   - Increasing gabapentin to 300/300/600mg, Tylenol and oxy 5mg prn for pain  - F/u with ophthalmology within 2 weeks of discharge    MSSA bacteremia  On 05/21, noted to have tachycardia, fever, and new leukocytosis. CXR without acute intracranial process. BCx growing GPCs, rapid ID showing MSSA. CT maxillofacial showing mild paranasal sinus disease with aerated secretions in L sphenoid sinus and innumerable cutaneous nodules consistent with neurofibromatosis. RUE US notable for superficial thrombophlebitis of R basilic and cephalic veins.    - Continue ancef  - ID consulted  - F/u repeat BCx and TTE    Elevated BP without diagnosis of hypertension  - Increasing lisinopril to 20mg and coreg to 25mg BID  - Trend BP and labs    Anxiety  Continuing home ativan      Neurofibromatosis  Hx of neurofibromatosis      Asthma  Continue home inhaler        VTE Risk Mitigation (From admission, onward)           Ordered     IP VTE LOW RISK PATIENT  Once         05/18/24 1505     Place sequential compression device  Until discontinued         05/18/24 1505                    Discharge Planning   AJ: 5/24/2024     Code Status: Full Code   Is the patient medically ready for discharge?:     Reason for patient still in hospital (select all that apply): Treatment and Consult recommendations  Discharge Plan A: Home with family                  Duyen Joy MD  Department of Hospital Medicine   [facility] - Med Surg ([place]-[facility])

## 2024-05-23 NOTE — SUBJECTIVE & OBJECTIVE
Interval History: NAEO. AF, hypertensive overnight requiring a dose of hydralazine. Reports improved pruritus with addition of benadryl. Complaining of nasal congestion. Still endorsing eye pain and nasal pain.    Review of Systems   Eyes:  Positive for photophobia, pain, discharge and visual disturbance.   Respiratory:  Negative for cough and shortness of breath.    Gastrointestinal:  Negative for abdominal pain, nausea and vomiting.   Skin:  Positive for rash.   Neurological:  Negative for dizziness and light-headedness.     Objective:     Vital Signs (Most Recent):  Temp: 98.8 °F (37.1 °C) (05/23/24 0810)  Pulse: 87 (05/23/24 0810)  Resp: 18 (05/23/24 0614)  BP: (!) 175/83 (05/23/24 0810)  SpO2: (!) 91 % (05/23/24 0810) Vital Signs (24h Range):  Temp:  [98.3 °F (36.8 °C)-98.9 °F (37.2 °C)] 98.8 °F (37.1 °C)  Pulse:  [87-93] 87  Resp:  [18] 18  SpO2:  [91 %-97 %] 91 %  BP: (147-189)/(73-92) 175/83     Weight: 68.9 kg (151 lb 14.4 oz)  Body mass index is 28.7 kg/m².  No intake or output data in the 24 hours ending 05/23/24 0848      Physical Exam  Vitals and nursing note reviewed.   Constitutional:       General: She is not in acute distress.  HENT:      Head: Normocephalic and atraumatic.      Comments:   Crusted vesicular lesions along the L V1 dermatome with lesions extending onto the L eyelid, neurofibromas present on eyelids  Erythema overlying L forehead and L eyelid stable, notable demarcation along midline of forehead     Mouth/Throat:      Mouth: Mucous membranes are dry.   Eyes:      Comments: Increased tearing L eye   Cardiovascular:      Rate and Rhythm: Normal rate and regular rhythm.   Pulmonary:      Effort: Pulmonary effort is normal. No respiratory distress.   Musculoskeletal:         General: Normal range of motion.      Right lower leg: No edema.      Left lower leg: No edema.   Skin:     General: Skin is warm and dry.      Comments:   Scabbed vesicular lesions on left brow  Diffuse neurofibromas      Neurological:      General: No focal deficit present.      Mental Status: She is alert and oriented to person, place, and time.   Psychiatric:         Mood and Affect: Mood normal.         Behavior: Behavior normal.             Significant Labs: All pertinent labs within the past 24 hours have been reviewed.    Significant Imaging: I have reviewed all pertinent imaging results/findings within the past 24 hours.

## 2024-05-23 NOTE — ASSESSMENT & PLAN NOTE
Developed fever of 100.6 on 5/21, blood cultures drawn and growing MSSA. Currently on cefazolin.   US confirms septic thrombophlebitis of the right basilic and cephalic arteries.    Recommendations  Continue cefazolin x 28 days from blood culture sterility

## 2024-05-23 NOTE — PROGRESS NOTES
[facility] - [facility] Surg ([place] [address])  Infectious Disease  Progress Note    Patient Name: Jessi Dial  MRN: [id number]  Admission Date: 5/18/2024  Length of Stay: 5 days  Attending Physician: Hernando Amos MD  Primary Care Provider: Oscar Shafer MD    Isolation Status: Contact and Airborne    Assessment/Plan:        * Herpes zoster ophthalmicus of left eye  68F with h/o asthma, COPD, seizures, and neurofibromatosis admitted 5/18 as transfer from [facility] for optho exam, being treated for herpes ophthalmicus. Pt not known to be immunocompromised.     Recommendations:   Continue IV acyclovir while inpatient, can switch to PO valtrex 1g TID on discharge  Can stop isolation once lesions are crusted over  Duration atleast 14d, but will need ophtho follow up to determine final duration depending on resolution of lesions on eye exam      MSSA bacteremia  Developed fever of 100.6 on 5/21, blood cultures drawn and growing MSSA. Currently on cefazolin.   US confirms septic thrombophlebitis of the right basilic and cephalic arteries.    Recommendations  Continue cefazolin x 28 days from blood culture sterility    Rohan Cuello MD  Infectious Disease  [facility] Surg ([place] [address])    Subjective:     Principal Problem:Herpes zoster ophthalmicus of left eye    HPI: Ms. Dial is a 68F with PMH of asthma, COPD, seizures, and neurofibromatosis admitted 5/18 as transfer from [facility] for optho exam. Pt reports approx 3 weeks of pain at and above L eye. Says she can't open her L eye without physically holding it upon, which has been going on about 1 month. But says she's able to see clearly when eyes held open. Denies f/c. Reports some drainage to L eye. Reports recently having some pain under b/l breasts and thinks she may have had a rash, but improved. Denies other new skin lesions or other areas of pain. Denies hearing loss or ear pain. Had been on valtrex outpatient and taking gabapentin for pain. Took po  "valtrex 1 tablet (1,000 mg total) by mouth 3 (three) times daily. for 10 days (4/26 - 5/6)    Has been seen by optho- noted "VA 20/30, IOP good, pupil fixed at approx 3 mm, AC with trace cell, K with few inferior KP's and diffuse PEE's. No carrington dendrite/pseudodendrites. Dilated exam without evidence of retinal necrosis (PVD present OU, which could explain floaters)"    Optho recommending iv acyclovir steroid drops and id consult    On last ID evaluation, plan was to continue IV acyclovir while inpatient, and to transition to PO valtrex 1gm tid (normal renal function) for tentatively 14 days, but dependent on clinical improvement and resolution of eye lesions.    Infectious disease now re-consulted for "Herpes zoster ophthalmicus now with GPC bactermeia, rapid ID showing MSSA. Antibiotic recs?".   Interval History: Feels a little better. Scalp itches. No fever or chills.    Review of Systems  Objective:     Vital Signs (Most Recent):  Temp: 97.4 °F (36.3 °C) (05/23/24 1154)  Pulse: 78 (05/23/24 1154)  Resp: 18 (05/23/24 1154)  BP: (!) 159/77 (05/23/24 1154)  SpO2: 95 % (05/23/24 1154) Vital Signs (24h Range):  Temp:  [97.4 °F (36.3 °C)-98.9 °F (37.2 °C)] 97.4 °F (36.3 °C)  Pulse:  [78-93] 78  Resp:  [18] 18  SpO2:  [91 %-97 %] 95 %  BP: (159-189)/(77-92) 159/77     Weight: 68.9 kg (151 lb 14.4 oz)  Body mass index is 28.7 kg/m².    Estimated Creatinine Clearance: 80.7 mL/min (based on SCr of 0.6 mg/dL).     Physical Exam  Vitals and nursing note reviewed.   Constitutional:       Appearance: Normal appearance.   HENT:      Head: Normocephalic and atraumatic.   Eyes:      Pupils: Pupils are equal, round, and reactive to light.   Cardiovascular:      Rate and Rhythm: Normal rate.   Abdominal:      Hernia: No hernia is present.   Skin:     Findings: Rash present.   Neurological:      Mental Status: She is alert.   Psychiatric:         Mood and Affect: Mood normal.         Thought Content: Thought content normal.        "   Significant Labs: CBC:   Recent Labs   Lab 05/22/24  0544 05/23/24  0411   WBC 13.79* 12.22   HGB 12.3 11.8*   HCT 35.9* 35.4*    309     Microbiology Results (last 7 days)       Procedure Component Value Units Date/Time    Blood culture [[id number]] Collected: 05/23/24 0412    Order Status: Completed Specimen: Blood Updated: 05/23/24 1515     Blood Culture, Routine No Growth to date    Blood culture [[id number]] Collected: 05/23/24 0416    Order Status: Completed Specimen: Blood Updated: 05/23/24 1515     Blood Culture, Routine No Growth to date    Blood culture [[id number]]  (Abnormal) Collected: 05/21/24 1151    Order Status: Completed Specimen: Blood Updated: 05/23/24 1109     Blood Culture, Routine Gram stain linda bottle: Gram positive cocci in clusters resembling Staph      Results called to and read back by: Glendy Hedrick RN 05/22/2024  08:53      Gram stain aer bottle: Gram positive cocci in clusters resembling Staph      Positive results previously called 05/23/2024  02:30      STAPHYLOCOCCUS AUREUS  ID consult required at [facility].  For susceptibility see order #[id number]      Blood culture [[id number]]  (Abnormal) Collected: 05/21/24 1150    Order Status: Completed Specimen: Blood Updated: 05/23/24 1108     Blood Culture, Routine Gram stain aer bottle: Gram positive cocci in clusters resembling Staph      Results called to and read back by:Marisa Zepeda LPN 05/22/2024  05:52      STAPHYLOCOCCUS AUREUS  Susceptibility pending  ID consult required at [facility].      MRSA/SA Rapid ID by PCR from Blood culture [[id number]]  (Abnormal) Collected: 05/21/24 1150    Order Status: Completed Updated: 05/22/24 0723     Staph aureus ID by PCR Positive     Methicillin Resistant ID by PCR Negative            Significant Imaging: I have reviewed all pertinent imaging results/findings within the past 24 hours.

## 2024-05-23 NOTE — ASSESSMENT & PLAN NOTE
On 05/21, noted to have tachycardia, fever, and new leukocytosis. CXR without acute intracranial process. BCx growing GPCs, rapid ID showing MSSA. CT maxillofacial showing mild paranasal sinus disease with aerated secretions in L sphenoid sinus and innumerable cutaneous nodules consistent with neurofibromatosis. RUE US notable for superficial thrombophlebitis of R basilic and cephalic veins.    - Continue ancef  - ID consulted  - F/u repeat BCx and TTE

## 2024-05-23 NOTE — SUBJECTIVE & OBJECTIVE
Interval History: Feels a little better. Scalp itches. No fever or chills.    Review of Systems  Objective:     Vital Signs (Most Recent):  Temp: 97.4 °F (36.3 °C) (05/23/24 1154)  Pulse: 78 (05/23/24 1154)  Resp: 18 (05/23/24 1154)  BP: (!) 159/77 (05/23/24 1154)  SpO2: 95 % (05/23/24 1154) Vital Signs (24h Range):  Temp:  [97.4 °F (36.3 °C)-98.9 °F (37.2 °C)] 97.4 °F (36.3 °C)  Pulse:  [78-93] 78  Resp:  [18] 18  SpO2:  [91 %-97 %] 95 %  BP: (159-189)/(77-92) 159/77     Weight: 68.9 kg (151 lb 14.4 oz)  Body mass index is 28.7 kg/m².    Estimated Creatinine Clearance: 80.7 mL/min (based on SCr of 0.6 mg/dL).     Physical Exam  Vitals and nursing note reviewed.   Constitutional:       Appearance: Normal appearance.   HENT:      Head: Normocephalic and atraumatic.   Eyes:      Pupils: Pupils are equal, round, and reactive to light.   Cardiovascular:      Rate and Rhythm: Normal rate.   Abdominal:      Hernia: No hernia is present.   Skin:     Findings: Rash present.   Neurological:      Mental Status: She is alert.   Psychiatric:         Mood and Affect: Mood normal.         Thought Content: Thought content normal.          Significant Labs: CBC:   Recent Labs   Lab 05/22/24  0544 05/23/24  0411   WBC 13.79* 12.22   HGB 12.3 11.8*   HCT 35.9* 35.4*    309     Microbiology Results (last 7 days)       Procedure Component Value Units Date/Time    Blood culture [[id number]] Collected: 05/23/24 0412    Order Status: Completed Specimen: Blood Updated: 05/23/24 1515     Blood Culture, Routine No Growth to date    Blood culture [[id number]] Collected: 05/23/24 0416    Order Status: Completed Specimen: Blood Updated: 05/23/24 1515     Blood Culture, Routine No Growth to date    Blood culture [[id number]]  (Abnormal) Collected: 05/21/24 1151    Order Status: Completed Specimen: Blood Updated: 05/23/24 1109     Blood Culture, Routine Gram stain linda bottle: Gram positive cocci in clusters resembling Staph      Results  called to and read back by: Glendy Hedrick RN 05/22/2024  08:53      Gram stain aer bottle: Gram positive cocci in clusters resembling Staph      Positive results previously called 05/23/2024  02:30      STAPHYLOCOCCUS AUREUS  ID consult required at [facility].  For susceptibility see order #[id number]      Blood culture [[id number]]  (Abnormal) Collected: 05/21/24 1150    Order Status: Completed Specimen: Blood Updated: 05/23/24 1108     Blood Culture, Routine Gram stain aer bottle: Gram positive cocci in clusters resembling Staph      Results called to and read back by:Marisa Zepeda LPN 05/22/2024  05:52      STAPHYLOCOCCUS AUREUS  Susceptibility pending  ID consult required at [facility].      MRSA/SA Rapid ID by PCR from Blood culture [[id number]]  (Abnormal) Collected: 05/21/24 1150    Order Status: Completed Updated: 05/22/24 0723     Staph aureus ID by PCR Positive     Methicillin Resistant ID by PCR Negative            Significant Imaging: I have reviewed all pertinent imaging results/findings within the past 24 hours.

## 2024-05-23 NOTE — ASSESSMENT & PLAN NOTE
68F with h/o asthma, COPD, seizures, and neurofibromatosis admitted 5/18 as transfer from [facility] for optho exam, being treated for herpes ophthalmicus. Pt not known to be immunocompromised.     Recommendations:   Continue IV acyclovir while inpatient, can switch to PO valtrex 1g TID on discharge  Can stop isolation once lesions are crusted over  Duration atleast 14d, but will need ophtho follow up to determine final duration depending on resolution of lesions on eye exam

## 2024-05-24 LAB
ASCENDING AORTA: 1.93 CM
AV INDEX (PROSTH): 0.99
AV MEAN GRADIENT: 4 MMHG
AV PEAK GRADIENT: 7 MMHG
AV VALVE AREA BY VELOCITY RATIO: 2.56 CM²
AV VALVE AREA: 2.85 CM²
AV VELOCITY RATIO: 0.89
BACTERIA BLD CULT: ABNORMAL
BSA FOR ECHO PROCEDURE: 1.74 M2
CV ECHO LV RWT: 0.47 CM
DOP CALC AO PEAK VEL: 1.33 M/S
DOP CALC AO VTI: 23.64 CM
DOP CALC LVOT AREA: 2.9 CM2
DOP CALC LVOT DIAMETER: 1.91 CM
DOP CALC LVOT PEAK VEL: 1.19 M/S
DOP CALC LVOT STROKE VOLUME: 67.3 CM3
DOP CALCLVOT PEAK VEL VTI: 23.5 CM
E WAVE DECELERATION TIME: 362.64 MSEC
E/A RATIO: 0.6
E/E' RATIO: 7.73 M/S
ECHO LV POSTERIOR WALL: 0.82 CM (ref 0.6–1.1)
EJECTION FRACTION: 63 %
FRACTIONAL SHORTENING: 46 % (ref 28–44)
INTERVENTRICULAR SEPTUM: 0.54 CM (ref 0.6–1.1)
LA MAJOR: 5.26 CM
LA MINOR: 3.41 CM
LA WIDTH: 3.33 CM
LEFT ATRIUM SIZE: 3.22 CM
LEFT ATRIUM VOLUME INDEX: 22.2 ML/M2
LEFT ATRIUM VOLUME: 37.71 CM3
LEFT INTERNAL DIMENSION IN SYSTOLE: 1.87 CM (ref 2.1–4)
LEFT VENTRICLE DIASTOLIC VOLUME INDEX: 29.14 ML/M2
LEFT VENTRICLE DIASTOLIC VOLUME: 49.54 ML
LEFT VENTRICLE MASS INDEX: 35 G/M2
LEFT VENTRICLE SYSTOLIC VOLUME INDEX: 6.3 ML/M2
LEFT VENTRICLE SYSTOLIC VOLUME: 10.7 ML
LEFT VENTRICULAR INTERNAL DIMENSION IN DIASTOLE: 3.46 CM (ref 3.5–6)
LEFT VENTRICULAR MASS: 59.3 G
LV LATERAL E/E' RATIO: 6.44 M/S
LV SEPTAL E/E' RATIO: 9.67 M/S
MV PEAK A VEL: 0.97 M/S
MV PEAK E VEL: 0.58 M/S
MV STENOSIS PRESSURE HALF TIME: 105.17 MS
MV VALVE AREA P 1/2 METHOD: 2.09 CM2
OHS CV RV/LV RATIO: 0.75 CM
PISA TR MAX VEL: 1.3 M/S
RA MAJOR: 2.96 CM
RA PRESSURE ESTIMATED: 3 MMHG
RA WIDTH: 2.45 CM
RIGHT VENTRICULAR END-DIASTOLIC DIMENSION: 2.6 CM
RV TB RVSP: 4 MMHG
SINUS: 1.91 CM
STJ: 2.1 CM
TDI LATERAL: 0.09 M/S
TDI SEPTAL: 0.06 M/S
TDI: 0.08 M/S
TR MAX PG: 7 MMHG
TRICUSPID ANNULAR PLANE SYSTOLIC EXCURSION: 1.73 CM
TV REST PULMONARY ARTERY PRESSURE: 10 MMHG
Z-SCORE OF LEFT VENTRICULAR DIMENSION IN END DIASTOLE: -3.12
Z-SCORE OF LEFT VENTRICULAR DIMENSION IN END SYSTOLE: -3.54

## 2024-05-24 PROCEDURE — 63600175 PHARM REV CODE 636 W HCPCS: Performed by: STUDENT IN AN ORGANIZED HEALTH CARE EDUCATION/TRAINING PROGRAM

## 2024-05-24 PROCEDURE — 25000003 PHARM REV CODE 250

## 2024-05-24 PROCEDURE — 25000003 PHARM REV CODE 250: Performed by: STUDENT IN AN ORGANIZED HEALTH CARE EDUCATION/TRAINING PROGRAM

## 2024-05-24 PROCEDURE — 99233 SBSQ HOSP IP/OBS HIGH 50: CPT | Mod: ,,, | Performed by: INTERNAL MEDICINE

## 2024-05-24 PROCEDURE — 25000242 PHARM REV CODE 250 ALT 637 W/ HCPCS

## 2024-05-24 PROCEDURE — 25000003 PHARM REV CODE 250: Performed by: HOSPITALIST

## 2024-05-24 PROCEDURE — 94761 N-INVAS EAR/PLS OXIMETRY MLT: CPT

## 2024-05-24 PROCEDURE — 27000207 HC ISOLATION

## 2024-05-24 PROCEDURE — 94640 AIRWAY INHALATION TREATMENT: CPT

## 2024-05-24 PROCEDURE — 63600175 PHARM REV CODE 636 W HCPCS: Performed by: HOSPITALIST

## 2024-05-24 PROCEDURE — 11000001 HC ACUTE MED/SURG PRIVATE ROOM

## 2024-05-24 RX ORDER — VALACYCLOVIR HYDROCHLORIDE 1 G/1
1000 TABLET, FILM COATED ORAL 3 TIMES DAILY
Start: 2024-05-24 | End: 2024-06-23

## 2024-05-24 RX ORDER — ATROPINE SULFATE 10 MG/ML
1 SOLUTION/ DROPS OPHTHALMIC DAILY
Start: 2024-05-25

## 2024-05-24 RX ORDER — ERYTHROMYCIN 5 MG/G
OINTMENT OPHTHALMIC 4 TIMES DAILY
Start: 2024-05-24

## 2024-05-24 RX ORDER — CARBOXYMETHYLCELLULOSE SODIUM 10 MG/ML
1 GEL OPHTHALMIC 4 TIMES DAILY
Start: 2024-05-24

## 2024-05-24 RX ORDER — PREDNISOLONE ACETATE 10 MG/ML
1 SUSPENSION/ DROPS OPHTHALMIC 4 TIMES DAILY
Status: DISCONTINUED | OUTPATIENT
Start: 2024-05-24 | End: 2024-05-29 | Stop reason: HOSPADM

## 2024-05-24 RX ORDER — CARVEDILOL 25 MG/1
25 TABLET ORAL 2 TIMES DAILY
Start: 2024-05-24 | End: 2025-05-24

## 2024-05-24 RX ORDER — DIPHENHYDRAMINE HCL 25 MG
25 CAPSULE ORAL EVERY 4 HOURS PRN
Start: 2024-05-24

## 2024-05-24 RX ORDER — LISINOPRIL 20 MG/1
20 TABLET ORAL DAILY
Start: 2024-05-25 | End: 2024-05-28 | Stop reason: HOSPADM

## 2024-05-24 RX ORDER — OCTREOTIDE ACETATE 50 UG/ML
INJECTION, SOLUTION INTRAVENOUS; SUBCUTANEOUS
Status: CANCELLED | OUTPATIENT
Start: 2024-05-24

## 2024-05-24 RX ORDER — GABAPENTIN 100 MG/1
200 CAPSULE ORAL 3 TIMES DAILY
Status: DISCONTINUED | OUTPATIENT
Start: 2024-05-24 | End: 2024-05-25

## 2024-05-24 RX ORDER — GABAPENTIN 100 MG/1
200 CAPSULE ORAL 3 TIMES DAILY
Qty: 180 CAPSULE | Refills: 11 | Status: SHIPPED | OUTPATIENT
Start: 2024-05-24 | End: 2024-05-27 | Stop reason: HOSPADM

## 2024-05-24 RX ADMIN — ENOXAPARIN SODIUM 40 MG: 40 INJECTION SUBCUTANEOUS at 05:05

## 2024-05-24 RX ADMIN — CETIRIZINE HYDROCHLORIDE 5 MG: 5 TABLET, FILM COATED ORAL at 09:05

## 2024-05-24 RX ADMIN — ERYTHROMYCIN: 5 OINTMENT OPHTHALMIC at 09:05

## 2024-05-24 RX ADMIN — PREDNISOLONE ACETATE 1 DROP: 10 SUSPENSION/ DROPS OPHTHALMIC at 03:05

## 2024-05-24 RX ADMIN — ERYTHROMYCIN: 5 OINTMENT OPHTHALMIC at 03:05

## 2024-05-24 RX ADMIN — CEFAZOLIN 2 G: 2 INJECTION, POWDER, FOR SOLUTION INTRAMUSCULAR; INTRAVENOUS at 03:05

## 2024-05-24 RX ADMIN — FLUTICASONE FUROATE AND VILANTEROL TRIFENATATE 1 PUFF: 100; 25 POWDER RESPIRATORY (INHALATION) at 08:05

## 2024-05-24 RX ADMIN — LISINOPRIL 20 MG: 20 TABLET ORAL at 09:05

## 2024-05-24 RX ADMIN — PREDNISOLONE ACETATE 1 DROP: 10 SUSPENSION/ DROPS OPHTHALMIC at 05:05

## 2024-05-24 RX ADMIN — PREDNISOLONE ACETATE 1 DROP: 10 SUSPENSION/ DROPS OPHTHALMIC at 09:05

## 2024-05-24 RX ADMIN — GABAPENTIN 300 MG: 300 CAPSULE ORAL at 09:05

## 2024-05-24 RX ADMIN — CEFAZOLIN 2 G: 2 INJECTION, POWDER, FOR SOLUTION INTRAMUSCULAR; INTRAVENOUS at 12:05

## 2024-05-24 RX ADMIN — CARBOXYMETHYLCELLULOSE SODIUM 1 DROP: 10 GEL OPHTHALMIC at 09:05

## 2024-05-24 RX ADMIN — FLUTICASONE PROPIONATE 100 MCG: 50 SPRAY, METERED NASAL at 09:05

## 2024-05-24 RX ADMIN — CARBOXYMETHYLCELLULOSE SODIUM 1 DROP: 10 GEL OPHTHALMIC at 12:05

## 2024-05-24 RX ADMIN — CEFAZOLIN 2 G: 2 INJECTION, POWDER, FOR SOLUTION INTRAMUSCULAR; INTRAVENOUS at 11:05

## 2024-05-24 RX ADMIN — LORAZEPAM 1 MG: 0.5 TABLET ORAL at 09:05

## 2024-05-24 RX ADMIN — GABAPENTIN 200 MG: 100 CAPSULE ORAL at 09:05

## 2024-05-24 RX ADMIN — ERYTHROMYCIN: 5 OINTMENT OPHTHALMIC at 05:05

## 2024-05-24 RX ADMIN — ACYCLOVIR SODIUM 480 MG: 50 INJECTION, SOLUTION INTRAVENOUS at 09:05

## 2024-05-24 RX ADMIN — CARVEDILOL 25 MG: 25 TABLET, FILM COATED ORAL at 09:05

## 2024-05-24 RX ADMIN — ATROPINE SULFATE 1 DROP: 10 SOLUTION/ DROPS OPHTHALMIC at 09:05

## 2024-05-24 RX ADMIN — ACYCLOVIR SODIUM 480 MG: 50 INJECTION, SOLUTION INTRAVENOUS at 12:05

## 2024-05-24 RX ADMIN — ACYCLOVIR SODIUM 480 MG: 50 INJECTION, SOLUTION INTRAVENOUS at 05:05

## 2024-05-24 RX ADMIN — PREDNISOLONE ACETATE 1 DROP: 10 SUSPENSION/ DROPS OPHTHALMIC at 06:05

## 2024-05-24 RX ADMIN — OXYCODONE 5 MG: 5 TABLET ORAL at 09:05

## 2024-05-24 NOTE — PROGRESS NOTES
[facility] ([place] [id number])  Infectious Disease  Progress Note    Patient Name: Jessi Dial  MRN: [id number]  Admission Date: 5/18/2024  Length of Stay: 6 days  Attending Physician: Hernando Amos MD  Primary Care Provider: Oscar Shafer MD    Isolation Status: Contact and Airborne    Assessment/Plan:      * Herpes zoster ophthalmicus of left eye  68F with h/o asthma, COPD, seizures, and neurofibromatosis admitted 5/18 as transfer from [facility] for optho exam, being treated for herpes ophthalmicus. Pt not known to be immunocompromised.     Recommendations:   Continue IV acyclovir while inpatient, can switch to PO valtrex 1g TID on discharge  Can stop isolation once lesions are crusted over  Duration atleast 14d, but will need ophtho follow up to determine final duration depending on resolution of lesions on eye exam      MSSA bacteremia/ septic thrombophlebitis    Developed fever of 100.6 on 5/21, blood cultures drawn and growing MSSA. Currently on cefazolin.     US confirms septic thrombophlebitis of the right basilic and cephalic arteries.    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 Would ensure that she has appropriate support at home for cefazolin IV abx x 4 weeks    Rohan Cuello MD  Infectious Disease  [facility] ([place] [id number])    Subjective:     Principal Problem:Herpes zoster ophthalmicus of left eye    HPI: Ms. Dial is a 68F with PMH of asthma, COPD, seizures, and neurofibromatosis admitted 5/18 as transfer from [facility] for optho exam. Pt reports approx 3 weeks of pain at and above L eye. Says she can't open her L eye without physically holding it upon, which has been going on about 1 month. But says she's able to see clearly when eyes held open. Denies f/c. Reports some drainage to L eye. Reports recently having some pain under b/l breasts and thinks she may have had a rash, but improved. Denies  "other new skin lesions or other areas of pain. Denies hearing loss or ear pain. Had been on valtrex outpatient and taking gabapentin for pain. Took po valtrex 1 tablet (1,000 mg total) by mouth 3 (three) times daily. for 10 days (4/26 - 5/6)    Has been seen by optho- noted "VA 20/30, IOP good, pupil fixed at approx 3 mm, AC with trace cell, K with few inferior KP's and diffuse PEE's. No carrington dendrite/pseudodendrites. Dilated exam without evidence of retinal necrosis (PVD present OU, which could explain floaters)"    Optho recommending iv acyclovir steroid drops and id consult    On last ID evaluation, plan was to continue IV acyclovir while inpatient, and to transition to PO valtrex 1gm tid (normal renal function) for tentatively 14 days, but dependent on clinical improvement and resolution of eye lesions.    Infectious disease now re-consulted for "Herpes zoster ophthalmicus now with GPC bactermeia, rapid ID showing MSSA. Antibiotic recs?".   Interval History: Feeling a little better. Scalp not itching as much. Denies fever or chills.    Review of Systems   Skin:  Positive for rash.   Neurological:  Positive for headaches.   All other systems reviewed and are negative.    Objective:     Vital Signs (Most Recent):  Temp: 98.7 °F (37.1 °C) (05/24/24 0827)  Pulse: 80 (05/24/24 0827)  Resp: 18 (05/24/24 0827)  BP: (!) 166/77 (05/24/24 0827)  SpO2: 96 % (05/24/24 0827) Vital Signs (24h Range):  Temp:  [97.4 °F (36.3 °C)-98.7 °F (37.1 °C)] 98.7 °F (37.1 °C)  Pulse:  [76-86] 80  Resp:  [17-18] 18  SpO2:  [92 %-96 %] 96 %  BP: (116-166)/(69-77) 166/77     Weight: 68.9 kg (151 lb 14.4 oz)  Body mass index is 28.7 kg/m².    Estimated Creatinine Clearance: 80.7 mL/min (based on SCr of 0.6 mg/dL).     Physical Exam  Vitals and nursing note reviewed.   Constitutional:       General: She is not in acute distress.     Appearance: Normal appearance. She is not ill-appearing, toxic-appearing or diaphoretic.   HENT:      Head: " Normocephalic and atraumatic.   Eyes:      Pupils: Pupils are equal, round, and reactive to light.   Skin:     Comments: Vesicles crusted   Neurological:      General: No focal deficit present.      Mental Status: She is alert and oriented to person, place, and time.   Psychiatric:         Mood and Affect: Mood normal.         Behavior: Behavior normal.         Thought Content: Thought content normal.         Judgment: Judgment normal.          Significant Labs: CBC:   Recent Labs   Lab 05/23/24 0411   WBC 12.22   HGB 11.8*   HCT 35.4*        Microbiology Results (last 7 days)       Procedure Component Value Units Date/Time    Blood culture [[id number]] Collected: 05/23/24 0416    Order Status: Completed Specimen: Blood Updated: 05/24/24 0812     Blood Culture, Routine No Growth to date      No Growth to date    Blood culture [[id number]] Collected: 05/23/24 0412    Order Status: Completed Specimen: Blood Updated: 05/24/24 0812     Blood Culture, Routine No Growth to date      No Growth to date    Blood culture [[id number]]  (Abnormal) Collected: 05/21/24 1151    Order Status: Completed Specimen: Blood Updated: 05/23/24 1109     Blood Culture, Routine Gram stain linda bottle: Gram positive cocci in clusters resembling Staph      Results called to and read back by: Glendy Hedrick RN 05/22/2024  08:53      Gram stain aer bottle: Gram positive cocci in clusters resembling Staph      Positive results previously called 05/23/2024  02:30      STAPHYLOCOCCUS AUREUS  ID consult required at [facility] and [facility].  For susceptibility see order #[id number]      Blood culture [[id number]]  (Abnormal) Collected: 05/21/24 1150    Order Status: Completed Specimen: Blood Updated: 05/23/24 1108     Blood Culture, Routine Gram stain aer bottle: Gram positive cocci in clusters resembling Staph      Results called to and read back by:Marisa Zepeda LPN 05/22/2024  05:52      STAPHYLOCOCCUS AUREUS  Susceptibility  pending  ID consult required at [facility] and [place] locations.      MRSA/SA Rapid ID by PCR from Blood culture [[id number]]  (Abnormal) Collected: 05/21/24 1150    Order Status: Completed Updated: 05/22/24 0723     Staph aureus ID by PCR Positive     Methicillin Resistant ID by PCR Negative            Significant Imaging: I have reviewed all pertinent imaging results/findings within the past 24 hours.

## 2024-05-24 NOTE — PROGRESS NOTES
Progress Note  Ophthalmology Service    Date: 05/24/2024    CC: blurry vision, new floaters intermittently for 1 month     HPI: Jessi Dial is a 68 y.o. female who has been having left sided facial rash and light sensitivity for 4 weeks. She has been treated as an outpatient by her PCP and ophthalmologist. She was previously on steroid drops and was taken off of them, but presented to her ophthalmologist yesterday 05/17 and was told to restart steroid drops BID. She also stated that she did have lesions on her eye that went away (per her eye doctor). She also states she was taking valtrex which had been decreased to BID from TID. She endorses difficulty opening eyelids but denies gross visual changes when eyes are open. Endorsing light sensitivity and scratchiness.      Interval: No new complaints. Still with irritation/foreign body sensation beneath left eyelids. Complaining of stable floaters for the past several weeks.      POH: CEIOL OU, plus above      Gtts: most recently pred forte BID, left eye    Family Hx: Denies family history of glaucoma, macular degeneration, or blindness. family history is not on file.     PMHx:  has a past medical history of Asthma, COPD (chronic obstructive pulmonary disease), Neurofibromatosis, and Seizures.     PSurgHx:  has no past surgical history on file.     Home Medications:   Prior to Admission medications    Medication Sig Start Date End Date Taking? Authorizing Provider   acetaminophen (TYLENOL) 325 MG tablet Take 2 tablets (650 mg total) by mouth every 4 (four) hours as needed. 2/25/18   Sukhwinder Oliva MD   budesonide-formoterol 80-4.5 mcg (SYMBICORT) 80-4.5 mcg/actuation HFAA Inhale 2 puffs into the lungs 2 (two) times daily. Controller 10/5/17 10/5/18  Ximena Bell MD   fluticasone propionate (FLONASE) 50 mcg/actuation nasal spray 1 spray (50 mcg total) by Each Nostril route 2 (two) times daily as needed for Rhinitis. 4/23/24   Stefan Burr,  DO   gabapentin (NEURONTIN) 100 MG capsule Take 100 mg by mouth 3 (three) times daily.    Provider, Historical   levocetirizine (XYZAL) 5 MG tablet Take 1 tablet (5 mg total) by mouth every evening. 4/23/24 4/23/25  Stefan Burr, DO   LORazepam (ATIVAN) 1 MG tablet Take 1 mg by mouth nightly as needed for Anxiety.    Provider, Historical   losartan (COZAAR) 50 MG tablet Take 50 mg by mouth once daily.    Provider, Historical   prednisoLONE acetate (PRED FORTE) 1 % DrpS Place 1 drop into the left eye 4 (four) times daily.    Provider, Historical   albuterol 90 mcg/actuation inhaler Inhale 1-2 puffs into the lungs every 6 (six) hours as needed for Wheezing. Rescue 10/5/17 5/20/24  Ximena Bell MD   aspirin 325 MG tablet Take 1 tablet (325 mg total) by mouth 2 (two) times daily. 2/25/18 5/20/24  Sukhwinder Oliva MD   butalbital-acetaminophen-caffeine -40 mg (FIORICET, ESGIC) -40 mg per tablet Take 1 tablet by mouth every 4 (four) hours as needed for Pain. 4/23/24 5/20/24  Stefan Burr,    naproxen (NAPROSYN) 500 MG tablet Take 1 tablet (500 mg total) by mouth 2 (two) times daily with meals. 7/2/21 5/20/24  Yvonne Garcia PA-C   oxyCODONE (ROXICODONE) 5 MG immediate release tablet Take 1 tablet (5 mg total) by mouth every 4 (four) hours as needed for Pain. 2/25/18 5/20/24  Sukhwinder Oliva MD   senna-docusate 8.6-50 mg (PERICOLACE) 8.6-50 mg per tablet Take 1 tablet by mouth 2 (two) times daily. 2/25/18 5/20/24  Sukhwinder Oliva MD   valACYclovir (VALTREX) 1000 MG tablet Take 1 tablet (1,000 mg total) by mouth 3 (three) times daily. for 10 days 4/26/24 5/20/24  Nusrat Frank, EPIFANIO        Medications this encounter:    acyclovir  10 mg/kg (Ideal) Intravenous Q8H    atropine 1%  1 drop Left Eye Daily    carboxymethylcellulose sodium  1 drop Ophthalmic QID    carvediloL  25 mg Oral BID    ceFAZolin (Ancef) IV (PEDS and ADULTS)  2 g Intravenous Q8H     cetirizine  5 mg Oral Daily    enoxparin  40 mg Subcutaneous Q24H (prophylaxis, 1700)    erythromycin   Left Eye QID    fluticasone furoate-vilanteroL  1 puff Inhalation Daily    fluticasone propionate  2 spray Each Nostril Daily    gabapentin  300 mg Oral TID    And    gabapentin  300 mg Oral QHS    lisinopriL  20 mg Oral Daily    prednisoLONE acetate  1 drop Left Eye 6x Daily       Allergies: is allergic to avelox [moxifloxacin].     Social:  reports that she has never smoked. She has never used smokeless tobacco. She reports that she does not drink alcohol and does not use drugs.     ROS: As per HPI    Ocular examination/Dilated fundus examination:  Base Eye Exam       Visual Acuity (Snellen - Linear)         Right Left    Dist sc 20/25 -2 20/40              Tonometry (Applanation, 3:04 PM)         Right Left    Pressure 9 19   Squeezing OS                 Slit Lamp and Fundus Exam       External Exam         Right Left    External neurofibromas neurofibromas, vesicular scaly rash superiorly              Slit Lamp Exam         Right Left    Lids/Lashes neurofibromas erythema, upper lid thickening, unroofed vesicular lesions    Conjunctiva/Sclera White and quiet White and quiet    Cornea Clear Inferior PEE's, no carrington dendrite/pseudodendrite, few central and inferior KP's    Anterior Chamber Deep and quiet deep, flare    Iris Round and minimally reactive, lisch nodules Round and minimally reactive, lisch nodules    Lens IOL IOL    Anterior Vitreous PVD PVD              Fundus Exam         Right Left    Disc  Pink and sharp    C/D Ratio  0.2    Macula  Flat and attached, pigment changes    Vessels  Normal caliber and crossings    Periphery  Flat with no holes, tears, or detachments                      Assessment/Plan:     # Herpes zoster ophthalmicus, left   # HZO anterior uveitis, left   - complaining severe pain to left side of forehead, light sensitivity, floaters, no carrington vision changes. Has been treated for  4 weeks, and per patient symptoms have improved . Per patient, ophthalmoloigst noted lesions on eye that have since resolved. Was most recently on pred forte BID  - external exam with numerous vesicular lesions superior to left eye, no obvious lesions on eyelid, negative varghese's sign  - on exam, VA 20/30, IOP good, pupil fixed at approx 3 mm, AC with trace cell, K with few inferior KP's and diffuse PEE's. No carrington dendrite/pseudodendrites.   - dilated exam without evidence of retinal necrosis (PVD present OU, which could explain floaters)  -will start drops as below, can increase steroid frequency if inflammation does not improve   - Without evidence of posterior involvement would defer to Infectious Disease regarding continuation of IV acyclovir for skin infection. Anterior uveitis can be treated with topical prednisolone acetate and atropine. Continue treatment of open ulcerations on eyelid with erythromycin ointment.   - 5/22/24: Pending bacteremia work-up with medicine (+ MSSA). Pending CT Max/Face. No evidence of significant preseptal cellulitis. EOMI remain stable w/o pain, no APD by reverse in left eye, vision stable, posterior exam normal.   - 05/24/24: Eye exam stable. No dendrites on fluorescein staining, IOP good without evidence of steroid response, vision grossly stable.DFE without vitritis or retinal lesions. Ok to discharge with outpatient follow up from ophtho perspective.     Recs  - Defer to ID/medicine for length of treatment with IV acyclovir, though agree with outpatient 1g Valtrex PO TID at discharge. Intraocular inflammation and eyelids to be managed as below:  - Decrease pred forte to QID, left eye  - Continue Atropine daily left eye  - Continue erythromycin ointment QID left eye and to periocular skin lesions until resolved   - Preservative free artificial tears to both eyes QID  - Warm compresses to periocular lesions TID   - Please space out drops by 5 minutes to ensure adequate  absorption. If applying at the same time as ointment, please apply drops prior to ointment.   - Patient has follow up with ophthalmologist in [place], can continue outpatient management there. Discussed with patient, and she prefers to follow up there since it is closer to home. Encouraged patient to make appointment to be seen there early next week.   - if discharged, please discharge on above regimen until seen by ophtho as outpatient      Adolfo Barth MD PGY-2  [employer] Ophthalmology Resident  05/24/2024  2:15 PM

## 2024-05-24 NOTE — PLAN OF CARE
Problem: Adult Inpatient Plan of Care  Goal: Plan of Care Review  Outcome: Progressing  Goal: Patient-Specific Goal (Individualized)  Outcome: Progressing  Goal: Absence of Hospital-Acquired Illness or Injury  Outcome: Progressing  Goal: Optimal Comfort and Wellbeing  Outcome: Progressing  Goal: Readiness for Transition of Care  Outcome: Progressing     Problem: Skin Injury Risk Increased  Goal: Skin Health and Integrity  Outcome: Progressing     Problem: Infection  Goal: Absence of Infection Signs and Symptoms  Outcome: Progressing     Problem: Pain Acute  Goal: Optimal Pain Control and Function  Outcome: Progressing   Pt AAO X 4; able to express needs.  Pain controlled with PO PRN meds.   Nodules all over body.  Multiple eye drops and IV ABX given as ordered.  Isolation precautions &  Safety precautions  maintained.  Bed in low position,  call  light in reach.

## 2024-05-24 NOTE — SUBJECTIVE & OBJECTIVE
Interval History: NAEO. AF, BP improving. Refused nighttime gabapentin dose as she feels that it is making her too sleepy. Pain improving.     Review of Systems   Eyes:  Positive for photophobia, pain, discharge and visual disturbance.   Respiratory:  Negative for cough and shortness of breath.    Gastrointestinal:  Negative for abdominal pain, nausea and vomiting.   Skin:  Positive for rash.   Neurological:  Negative for dizziness and light-headedness.     Objective:     Vital Signs (Most Recent):  Temp: 99 °F (37.2 °C) (05/24/24 1500)  Pulse: 88 (05/24/24 1500)  Resp: 18 (05/24/24 1500)  BP: (!) 147/69 (05/24/24 1500)  SpO2: 95 % (05/24/24 1500) Vital Signs (24h Range):  Temp:  [98 °F (36.7 °C)-99 °F (37.2 °C)] 99 °F (37.2 °C)  Pulse:  [76-88] 88  Resp:  [17-18] 18  SpO2:  [92 %-96 %] 95 %  BP: (116-177)/(69-82) 147/69     Weight: 70.3 kg (155 lb)  Body mass index is 29.29 kg/m².    Intake/Output Summary (Last 24 hours) at 5/24/2024 1555  Last data filed at 5/24/2024 0428  Gross per 24 hour   Intake 1280 ml   Output 1 ml   Net 1279 ml         Physical Exam  Vitals and nursing note reviewed.   Constitutional:       General: She is not in acute distress.  HENT:      Head: Normocephalic and atraumatic.      Comments:   Crusted vesicular lesions along the L V1 dermatome with lesions extending onto the L eyelid, neurofibromas present on eyelids  Erythema overlying L forehead and L eyelid stable, notable demarcation along midline of forehead     Mouth/Throat:      Mouth: Mucous membranes are moist.   Eyes:      Comments: Eyes able to open more today   Cardiovascular:      Rate and Rhythm: Normal rate and regular rhythm.   Pulmonary:      Effort: Pulmonary effort is normal. No respiratory distress.   Musculoskeletal:         General: Normal range of motion.      Right lower leg: No edema.      Left lower leg: No edema.   Skin:     General: Skin is warm and dry.      Comments:   Scabbed vesicular lesions on left  brow  Diffuse neurofibromas    Neurological:      General: No focal deficit present.      Mental Status: She is alert and oriented to person, place, and time.   Psychiatric:         Mood and Affect: Mood normal.         Behavior: Behavior normal.             Significant Labs: All pertinent labs within the past 24 hours have been reviewed.    Significant Imaging: I have reviewed all pertinent imaging results/findings within the past 24 hours.

## 2024-05-24 NOTE — ASSESSMENT & PLAN NOTE
68F with h/o asthma, COPD, seizures, and neurofibromatosis admitted 5/18 as transfer from [facility] for optho exam, being treated for herpes ophthalmicus. Pt not known to be immunocompromised.     Recommendations:   Continue IV acyclovir while inpatient, can switch to PO valtrex 1g TID on discharge  Can stop isolation once lesions are crusted over  Duration atleast 14d, but will need ophtho follow up to determine final duration depending on resolution of lesions on eye exam

## 2024-05-24 NOTE — PROGRESS NOTES
[facility] - Med Surg ([address] Medicine  Progress Note    Patient Name: Jessi Dial  MRN: [id number]  Patient Class: IP- Inpatient   Admission Date: 5/18/2024  Length of Stay: 6 days  Attending Physician: Hernando Amos MD  Primary Care Provider: Oscar Shafer MD        Subjective:     Principal Problem:Herpes zoster ophthalmicus of left eye        HPI:  Jessi Dial onesimo  67 yo W w/ PMH of asthma, COPD, seizures, and neurofibromatosis who presented to [facility] ED for persistent pain tot he left side of her face and was transferred to [facility] for inpatient ophthalmology evaluation. She reports that about 5 weeks ago she started having pain b/l below her breasts that later developed into an itchy and painful foul-smelling rash. This has been improving. She presented initially to the ED on 04/23 for L-sided headache and ear pain with N/V and was treated and discharged. She then presented to [facility] ED on 04/26 for a rash with an associated burning sensation that covered the L forehead and upper eyelid. The ED provider noted that she had no Pemberton sign and fluorescein staining of the L eye showed no dendritic lesions. She was discharged with valacyclovir and prednisolone eye drops but presented to the ED again yesterday as the pain had continued to persist despite the medications. She was followed outpatient by her PCP and an ophthalmologist. The vesicular lesions have begun to crust over but she is continuing to have significant pain over her L eye and struggles to keep it open. She reports that she was started on gabapentin outpatient for additional pain control and that it initially made her drowsy and confused but that those symptoms resolved as she continued taking it. Reports vision changes due to eye discharge and pain. Denies fever, chills, hearing changes, abdominal pain, shortness of breath, cough, chest pain.    In the OSH ED, she was hypertensive and tachycardic,  afebrile. Labs notable for leukocytosis to 13.11 and Na 134. CTH without evidence of acute intracranial abnormality but notable for numerous scalp neurofibromas. Received a dose of IV acyclovir. Transferred here for further evaluation and management.     Overview/Hospital Course:  Admitted to hospital medicine for herpes zoster ophthalmicus. Started on IV acyclovir. Ophthalmology consulted and added several eye drops. ID consulted with recs to transition to PO valtrex for 14 days upon discharge. Swelling and vision with mild improvement noted on 5/20/24 but significant pain still present. Became febrile and tachycardic with a new leukocytosis on 05/21, started on vanc + rocephin. CXR without an acute intrathoracic process. BCx growing GPCs and rapid ID showing MSSA, deescalated to ancef. CT maxillofacial showing mild paranasal sinus disease with aerated secretions in L sphenoid sinus and innumerable cutaneous nodules consistent with neurofibromatosis. ID re-consulted for new bacteremia. Repeat BCx NGTD. RUE US showing thrombophlebitis. TTE with EF 60-65% and without valvular vegetations.    Interval History: NAEO. AF, BP improving. Refused nighttime gabapentin dose as she feels that it is making her too sleepy. Pain improving.     Review of Systems   Eyes:  Positive for photophobia, pain, discharge and visual disturbance.   Respiratory:  Negative for cough and shortness of breath.    Gastrointestinal:  Negative for abdominal pain, nausea and vomiting.   Skin:  Positive for rash.   Neurological:  Negative for dizziness and light-headedness.     Objective:     Vital Signs (Most Recent):  Temp: 99 °F (37.2 °C) (05/24/24 1500)  Pulse: 88 (05/24/24 1500)  Resp: 18 (05/24/24 1500)  BP: (!) 147/69 (05/24/24 1500)  SpO2: 95 % (05/24/24 1500) Vital Signs (24h Range):  Temp:  [98 °F (36.7 °C)-99 °F (37.2 °C)] 99 °F (37.2 °C)  Pulse:  [76-88] 88  Resp:  [17-18] 18  SpO2:  [92 %-96 %] 95 %  BP: (116-177)/(69-82) 147/69      Weight: 70.3 kg (155 lb)  Body mass index is 29.29 kg/m².    Intake/Output Summary (Last 24 hours) at 5/24/2024 1555  Last data filed at 5/24/2024 0428  Gross per 24 hour   Intake 1280 ml   Output 1 ml   Net 1279 ml         Physical Exam  Vitals and nursing note reviewed.   Constitutional:       General: She is not in acute distress.  HENT:      Head: Normocephalic and atraumatic.      Comments:   Crusted vesicular lesions along the L V1 dermatome with lesions extending onto the L eyelid, neurofibromas present on eyelids  Erythema overlying L forehead and L eyelid stable, notable demarcation along midline of forehead     Mouth/Throat:      Mouth: Mucous membranes are moist.   Eyes:      Comments: Eyes able to open more today   Cardiovascular:      Rate and Rhythm: Normal rate and regular rhythm.   Pulmonary:      Effort: Pulmonary effort is normal. No respiratory distress.   Musculoskeletal:         General: Normal range of motion.      Right lower leg: No edema.      Left lower leg: No edema.   Skin:     General: Skin is warm and dry.      Comments:   Scabbed vesicular lesions on left brow  Diffuse neurofibromas    Neurological:      General: No focal deficit present.      Mental Status: She is alert and oriented to person, place, and time.   Psychiatric:         Mood and Affect: Mood normal.         Behavior: Behavior normal.             Significant Labs: All pertinent labs within the past 24 hours have been reviewed.    Significant Imaging: I have reviewed all pertinent imaging results/findings within the past 24 hours.    Assessment/Plan:      * Herpes zoster ophthalmicus of left eye  3-4 week history of vesicular lesions and pain extending along the L V1 dermatome including the L eyelid. She reports L eye discharge and pain that impacts her vision. She was seen in the ED on 04/26 for this and followed outpatient by her PCP and ophthalmologist. Was taking Valtrex and using steroid eye drops, but the pain and  rash persisted. Transferred here for inpatient ophthalmology evaluation. Vision impaired by discharge and presence of rash and neurofibromas on L eyelid, but otherwise no vision loss. Became febrile and tachycardic with new leukocytosis on 05/21.    - Ophthalmology consulted, recommended additional eye drop medications - prednisolone 4x/day, atropine, erythromycin, and artifical tears  - Benadryl and topical acyclovir for pruritis  - ID consulted with recs to continue IV acyclovir 10mg/kg q8h & transition to PO valtrex 1gm tid   - Decreasing gabapentin to 200mg TID, Tylenol and oxy 5mg prn for pain  - F/u with ophthalmology in [place] within 2 weeks of discharge - discuss date and time with patient    MSSA bacteremia  On 05/21, noted to have tachycardia, fever, and new leukocytosis. CXR without acute intracranial process. BCx growing GPCs, rapid ID showing MSSA. CT maxillofacial showing mild paranasal sinus disease with aerated secretions in L sphenoid sinus and innumerable cutaneous nodules consistent with neurofibromatosis. RUE US notable for superficial thrombophlebitis of R basilic and cephalic veins. TTE (05/24/24) showing normal EF and no valvular vegetations.    - Continue ancef, will need PICC line prior to discharge  - ID following, recommending 4 weeks of IV ancef following BCx clearance  - F/u repeat BCx     Elevated BP without diagnosis of hypertension  - Continue lisinopril to 20mg and coreg to 25mg BID  - Trend BP and labs    Anxiety  Continuing home ativan      Neurofibromatosis  Hx of neurofibromatosis      Asthma  Continue home inhaler        VTE Risk Mitigation (From admission, onward)           Ordered     enoxaparin injection 40 mg  Every 24 hours         05/23/24 1614     IP VTE LOW RISK PATIENT  Once         05/18/24 1505     Place sequential compression device  Until discontinued         05/18/24 1505                    Discharge Planning   AJ: 5/25/2024     Code Status: Full Code   Is the  patient medically ready for discharge?:     Reason for patient still in hospital (select all that apply): Laboratory test, Treatment, and Pending disposition  Discharge Plan A: Skilled Nursing Facility                  Duyen Joy MD  Department of Hospital Medicine   [facility] ([place]

## 2024-05-24 NOTE — ASSESSMENT & PLAN NOTE
Developed fever of 100.6 on 5/21, blood cultures drawn and growing MSSA. Currently on cefazolin.   US confirms septic thrombophlebitis of the right basilic and cephalic arteries.    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 Would ensure that she has appropriate support at home for cefazolin IV abx x 4 weeks

## 2024-05-24 NOTE — SUBJECTIVE & OBJECTIVE
Interval History: Feeling a little better. Scalp not itching as much. Denies fever or chills.    Review of Systems   Skin:  Positive for rash.   Neurological:  Positive for headaches.   All other systems reviewed and are negative.    Objective:     Vital Signs (Most Recent):  Temp: 98.7 °F (37.1 °C) (05/24/24 0827)  Pulse: 80 (05/24/24 0827)  Resp: 18 (05/24/24 0827)  BP: (!) 166/77 (05/24/24 0827)  SpO2: 96 % (05/24/24 0827) Vital Signs (24h Range):  Temp:  [97.4 °F (36.3 °C)-98.7 °F (37.1 °C)] 98.7 °F (37.1 °C)  Pulse:  [76-86] 80  Resp:  [17-18] 18  SpO2:  [92 %-96 %] 96 %  BP: (116-166)/(69-77) 166/77     Weight: 68.9 kg (151 lb 14.4 oz)  Body mass index is 28.7 kg/m².    Estimated Creatinine Clearance: 80.7 mL/min (based on SCr of 0.6 mg/dL).     Physical Exam  Vitals and nursing note reviewed.   Constitutional:       General: She is not in acute distress.     Appearance: Normal appearance. She is not ill-appearing, toxic-appearing or diaphoretic.   HENT:      Head: Normocephalic and atraumatic.   Eyes:      Pupils: Pupils are equal, round, and reactive to light.   Skin:     Comments: Vesicles crusted   Neurological:      General: No focal deficit present.      Mental Status: She is alert and oriented to person, place, and time.   Psychiatric:         Mood and Affect: Mood normal.         Behavior: Behavior normal.         Thought Content: Thought content normal.         Judgment: Judgment normal.          Significant Labs: CBC:   Recent Labs   Lab 05/23/24 0411   WBC 12.22   HGB 11.8*   HCT 35.4*        Microbiology Results (last 7 days)       Procedure Component Value Units Date/Time    Blood culture [[id number]] Collected: 05/23/24 0416    Order Status: Completed Specimen: Blood Updated: 05/24/24 0812     Blood Culture, Routine No Growth to date      No Growth to date    Blood culture [[id number]] Collected: 05/23/24 0412    Order Status: Completed Specimen: Blood Updated: 05/24/24 0812     Blood  Culture, Routine No Growth to date      No Growth to date    Blood culture [[id number]]  (Abnormal) Collected: 05/21/24 1151    Order Status: Completed Specimen: Blood Updated: 05/23/24 1109     Blood Culture, Routine Gram stain linda bottle: Gram positive cocci in clusters resembling Staph      Results called to and read back by: Glendy Hedrick RN 05/22/2024  08:53      Gram stain aer bottle: Gram positive cocci in clusters resembling Staph      Positive results previously called 05/23/2024  02:30      STAPHYLOCOCCUS AUREUS  ID consult required at [facility].  For susceptibility see order #[id number]      Blood culture [[id number]]  (Abnormal) Collected: 05/21/24 1150    Order Status: Completed Specimen: Blood Updated: 05/23/24 1108     Blood Culture, Routine Gram stain aer bottle: Gram positive cocci in clusters resembling Staph      Results called to and read back by:Marisa Zepeda LPN 05/22/2024  05:52      STAPHYLOCOCCUS AUREUS  Susceptibility pending  ID consult required at [facility].      MRSA/SA Rapid ID by PCR from Blood culture [[id number]]  (Abnormal) Collected: 05/21/24 1150    Order Status: Completed Updated: 05/22/24 0723     Staph aureus ID by PCR Positive     Methicillin Resistant ID by PCR Negative            Significant Imaging: I have reviewed all pertinent imaging results/findings within the past 24 hours.

## 2024-05-24 NOTE — ASSESSMENT & PLAN NOTE
3-4 week history of vesicular lesions and pain extending along the L V1 dermatome including the L eyelid. She reports L eye discharge and pain that impacts her vision. She was seen in the ED on 04/26 for this and followed outpatient by her PCP and ophthalmologist. Was taking Valtrex and using steroid eye drops, but the pain and rash persisted. Transferred here for inpatient ophthalmology evaluation. Vision impaired by discharge and presence of rash and neurofibromas on L eyelid, but otherwise no vision loss. Became febrile and tachycardic with new leukocytosis on 05/21.    - Ophthalmology consulted, recommended additional eye drop medications - prednisolone 4x/day, atropine, erythromycin, and artifical tears  - Benadryl and topical acyclovir for pruritis  - ID consulted with recs to continue IV acyclovir 10mg/kg q8h & transition to PO valtrex 1gm tid   - Decreasing gabapentin to 200mg TID, Tylenol and oxy 5mg prn for pain  - F/u with ophthalmology in [place] within 2 weeks of discharge - discuss date and time with patient

## 2024-05-24 NOTE — PLAN OF CARE
[facility] - Med S[facility] ([facility])  Discharge Reassessment    Primary Care Provider: Oscar Shafer MD    Expected Discharge Date: 5/25/2024    Reassessment (most recent)       Discharge Reassessment - 05/24/24 0950          Discharge Reassessment    Assessment Type Discharge Planning Reassessment (P)      Did the patient's condition or plan change since previous assessment? No (P)      Discharge Plan A Home with family (P)      Discharge Plan B Home (P)      DME Needed Upon Discharge  none (P)      Transition of Care Barriers None (P)      Why the patient remains in the hospital Requires continued medical care (P)         Post-Acute Status    Post-Acute Authorization Other (P)      Coverage HU[employer] MANAGED MEDICARE - HU[employer] MEDICARE HMO - (P)      Other Status No Post-Acute Service Needs (P)                  Discharge Plan A and Plan B have been determined by review of patient's clinical status, future medical and therapeutic needs, and coverage/benefits for post-acute care in coordination with multidisciplinary team members.                     Bernardo Perales, MARCELO, LMSW  [facility]  Case Management  Ext. [phone]

## 2024-05-24 NOTE — PLAN OF CARE
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       Follow Up Appointments:  No future appointments.    Allergies:  Review of patient's allergies indicates:   Allergen Reactions    Avelox [moxifloxacin] Swelling       Medications: Review discharge medications with patient and family and provide education.    Please space out eye drops by 5 minutes to ensure adequate absorption. If applying at the same time as ointment, please apply drops prior to ointment   Current Facility-Administered Medications   Medication Dose Route Frequency Provider Last Rate Last Admin    acetaminophen tablet 650 mg  650 mg Oral Q6H PRN Duyen Joy MD   650 mg at 05/23/24 0854    acyclovir 480 mg in dextrose 5 % (D5W) 100 mL IVPB  10 mg/kg (Ideal) Intravenous Q8H Duyen Joy MD   Stopped at 05/24/24 1010    atropine 1% ophthalmic solution 1 drop  1 drop Left Eye Daily Aviva Alvarez MD   1 drop at 05/24/24 0924    carboxymethylcellulose sodium 1 % DpGe 1 drop  1 drop Ophthalmic QID Adolfo Barth MD   1 drop at 05/24/24 1205    carvediloL tablet 25 mg  25 mg Oral BID Alexandre Castillo MD   25 mg at 05/24/24 0912    ceFAZolin 2 g in dextrose 5 % in water (D5W) 50 mL IVPB (MB+)  2 g Intravenous Q8H Ed Jolly MD   Stopped at 05/24/24 1235    cetirizine tablet 5 mg  5  mg Oral Daily Duyen Joy MD   5 mg at 05/24/24 0912    dextrose 10% bolus 125 mL 125 mL  12.5 g Intravenous PRN Duyen Joy MD        dextrose 10% bolus 250 mL 250 mL  25 g Intravenous PRN Duyen Joy MD        diphenhydrAMINE capsule 25 mg  25 mg Oral Q4H PRN Duyen Joy MD   25 mg at 05/23/24 2032    enoxaparin injection 40 mg  40 mg Subcutaneous Q24H (prophylaxis, 1700) Duyen Joy MD   40 mg at 05/23/24 1740    erythromycin 5 mg/gram (0.5 %) ophthalmic ointment   Left Eye QID Adolfo Barth MD   Given at 05/24/24 1502    fluticasone furoate-vilanteroL 100-25 mcg/dose diskus inhaler 1 puff  1 puff Inhalation Daily Aviva Alvarez MD   1 puff at 05/24/24 0827    fluticasone propionate 50 mcg/actuation nasal spray 100 mcg  2 spray Each Nostril Daily Duyen Joy MD   100 mcg at 05/24/24 0913    gabapentin capsule 200 mg  200 mg Oral TID Duyen Joy MD        glucagon (human recombinant) injection 1 mg  1 mg Intramuscular PRN Duyen Joy MD        glucose chewable tablet 16 g  16 g Oral PRN Duyen Joy MD        glucose chewable tablet 24 g  24 g Oral PRN Duyen Joy MD        hydrALAZINE tablet 25 mg  25 mg Oral Q6H PRN Alexandre Castillo MD   25 mg at 05/23/24 0653    lisinopriL tablet 20 mg  20 mg Oral Daily Duyen Jyo MD   20 mg at 05/24/24 0914    LORazepam tablet 1 mg  1 mg Oral Nightly PRN Duyen Joy MD   1 mg at 05/23/24 2032    naloxone 0.4 mg/mL injection 0.02 mg  0.02 mg Intravenous PRN Duyen Joy MD        ondansetron injection 4 mg  4 mg Intravenous Q6H PRN Duyen Joy MD   4 mg at 05/20/24 1006    oxyCODONE immediate release tablet 5 mg  5 mg Oral Q4H PRN Duyen Joy MD   5 mg at 05/23/24 0906    oxymetazoline 0.05 % nasal spray 2 spray  2 spray Each Nostril Q8H PRN Duyen Joy MD        prednisoLONE acetate 1 % ophthalmic suspension 1 drop  1 drop Left Eye QID Adolfo Barth MD        sodium chloride 0.9% flush 10 mL  10 mL Intravenous Q12H PRN Rufino,  MD Duyen         Current Discharge Medication List        START taking these medications    Details   atropine 1% (ISOPTO ATROPINE) 1 % Drop Place 1 drop into the left eye once daily.      carboxymethylcellulose sodium 1 % DpGe Apply 1 drop to eye 4 (four) times daily.      carvediloL (COREG) 25 MG tablet Take 1 tablet (25 mg total) by mouth 2 (two) times daily.    Comments: .      dextrose 5 % in water (D5W) PgBk 50 mL with ceFAZolin 2 gram SolR 2 g Inject 2 g into the vein every 8 (eight) hours.      diphenhydrAMINE (BENADRYL) 25 mg capsule Take 1 capsule (25 mg total) by mouth every 4 (four) hours as needed for Itching.      erythromycin (ROMYCIN) ophthalmic ointment Place into the left eye 4 (four) times daily.      lisinopriL (PRINIVIL,ZESTRIL) 20 MG tablet Take 1 tablet (20 mg total) by mouth once daily.    Comments: .      valACYclovir (VALTREX) 1000 MG tablet Take 1 tablet (1,000 mg total) by mouth 3 (three) times daily.           CONTINUE these medications which have CHANGED    Details   gabapentin (NEURONTIN) 100 MG capsule Take 2 capsules (200 mg total) by mouth 3 (three) times daily.  Qty: 180 capsule, Refills: 11           CONTINUE these medications which have NOT CHANGED    Details   acetaminophen (TYLENOL) 325 MG tablet Take 2 tablets (650 mg total) by mouth every 4 (four) hours as needed.  Refills: 0      budesonide-formoterol 80-4.5 mcg (SYMBICORT) 80-4.5 mcg/actuation HFAA Inhale 2 puffs into the lungs 2 (two) times daily. Controller  Qty: 1 Inhaler, Refills: 0      fluticasone propionate (FLONASE) 50 mcg/actuation nasal spray 1 spray (50 mcg total) by Each Nostril route 2 (two) times daily as needed for Rhinitis.  Qty: 15 g, Refills: 0      levocetirizine (XYZAL) 5 MG tablet Take 1 tablet (5 mg total) by mouth every evening.  Qty: 30 tablet, Refills: 11      LORazepam (ATIVAN) 1 MG tablet Take 1 mg by mouth nightly as needed for Anxiety.      losartan (COZAAR) 50 MG tablet Take 50 mg by mouth once  daily.      prednisoLONE acetate (PRED FORTE) 1 % DrpS Place 1 drop into the left eye 4 (four) times daily.               I have seen and examined this patient within the last 30 days. My clinical findings that support the need for the home health skilled services and home bound status are the following:no   Medical restrictions requiring assistance of another human to leave home due to  IV infusion Needs and Visual Impairment.     Diet:   regular diet    Labs:  SN to perform labs:  CBC: Weekly; 4 week(s) and CMP: Weekly; 4 week(s)    Referrals/ Consults  Physical Therapy to evaluate and treat. Evaluate for home safety and equipment needs; Establish/upgrade home exercise program. Perform / instruct on therapeutic exercises, gait training, transfer training, and Range of Motion.  Occupational Therapy to evaluate and treat. Evaluate home environment for safety and equipment needs. Perform/Instruct on transfers, ADL training, ROM, and therapeutic exercises.  Aide to provide assistance with personal care, ADLs, and vital signs.    Activities:   activity as tolerated    Nursing:   Agency to admit patient within 24 hours of hospital discharge unless specified on physician order or at patient request    SN to complete comprehensive assessment including routine vital signs. Instruct on disease process and s/s of complications to report to MD. Review/verify medication list sent home with the patient at time of discharge  and instruct patient/caregiver as needed. Frequency may be adjusted depending on start of care date.     Skilled nurse to perform up to 3 visits PRN for symptoms related to diagnosis    Notify MD if SBP > 160 or < 90; DBP > 90 or < 50; HR > 120 or < 50; Temp > 101; O2 < 88%; Other:   none    Ok to schedule additional visits based on staff availability and patient request on consecutive days within the home health episode.    When multiple disciplines ordered:    Start of Care occurs on Sunday - Wednesday  schedule remaining discipline evaluations as ordered on separate consecutive days following the start of care.    Thursday SOC -schedule subsequent evaluations Friday and Monday the following week.     Friday - Saturday SOC - schedule subsequent discipline evaluations on consecutive days starting Monday of the following week.    For all post-discharge communication and subsequent orders please contact patient's primary care physician. If unable to reach primary care physician or do not receive response within 30 minutes, please contact Emergency Physician for clinical staff order clarification    Miscellaneous   PICC Line Care: PICC Line Use/Care Instructions:  Scrub the Hub: Prior to accessing the line, always perform a 30 second alcohol scrub  Each lumen of the central line is to be flushed at least daily with 10 mL Normal Saline and 3 mL Heparin flush (10 units/mL)  Skilled Nurse (SN) may draw blood from IV access  Blood Draw Procedure:  - Aspirate at least 5 mL of blood  - Discard  - Obtain specimen  - Change injection cap  - Flush with 20 mL Normal Saline followed by a 3-5 mL Heparin flush (10 units/mL)    Central :  - Sterile dressing changes are done weekly and as needed.  - Use chlor-hexadine scrub to cleanse site, apply Biopatch to insertion site, apply securement device dressing  - Injection caps are changed weekly and after EVERY lab draw.  - If sterile gauze is under dressing to control oozing, dressing change must be performed every 24 hours until gauze is not needed.    Home Health Aide:  Nursing Daily, Physical Therapy Three times weekly, Occupational Therapy Three times weekly, and Home Health Aide Daily    Wound Care Orders  no    I certify that this patient is confined to her home and needs intermittent skilled nursing care, physical therapy, and occupational therapy.

## 2024-05-24 NOTE — PLAN OF CARE
Discharge Plan A and Plan B have been determined by review of patient's clinical status, future medical and therapeutic needs, and coverage/benefits for post-acute care in coordination with multidisciplinary team members.    05/24/24 1436   Post-Acute Status   Post-Acute Authorization Placement   Coverage [employer] MANAGED MEDICARE - [employer] MEDICARE HMO -   Discharge Plan   Discharge Plan A Skilled Nursing Facility   Discharge Plan B Home Health;Home with family     STEPHANIE confirmed w/ MD that patient to dc requiring appx 4weeks course of IV abx. SW reviewed dc needs w/ the patient. Patient verbalized understanding requiring long-term IV abx upon dc. SW reviewed post-acute options w/ patient. (HH, home infusion vs SNF). Patient states that her spouse is not available to assist her at home due to he works during the day. Patient states that she has a sister that may be able to help but she is unsure. Patient states that she is agreeable to SNF placement for completion of IV abx.       SW reviewed list of facilities in-network with patient's payor plan. Providers that are owned, operated, or affiliated with [facility] are included on the list.     Notified that referral sent to below listed facilities from in-network list based on proximity to home/family support:   [facility]  2. [facility]  3. [facility]  4. [facility]      Patient/family instructed to identify preference.    Preferred Facility: (if more than 1, listed in order of descending preference)  [facility]    If an additional preferred facility not listed above is identified, additional referral to be sent. If above facilities unable to accept, will send additional referrals to in-network providers.        STEPHANIE will continue to follow.                      MARCELO Munoz, LMSW  [facility]  Case Management  Ext. [phone]

## 2024-05-24 NOTE — ASSESSMENT & PLAN NOTE
On 05/21, noted to have tachycardia, fever, and new leukocytosis. CXR without acute intracranial process. BCx growing GPCs, rapid ID showing MSSA. CT maxillofacial showing mild paranasal sinus disease with aerated secretions in L sphenoid sinus and innumerable cutaneous nodules consistent with neurofibromatosis. RUE US notable for superficial thrombophlebitis of R basilic and cephalic veins. TTE (05/24/24) showing normal EF and no valvular vegetations.    - Continue ancef, will need PICC line prior to discharge  - ID following, recommending 4 weeks of IV ancef following BCx clearance  - F/u repeat BCx

## 2024-05-25 PROBLEM — Z45.2 NEEDS PERIPHERALLY INSERTED CENTRAL CATHETER (PICC): Status: ACTIVE | Noted: 2024-05-25

## 2024-05-25 PROBLEM — B99.9 INFECTION REQUIRING CONTACT ISOLATION PRECAUTIONS: Status: ACTIVE | Noted: 2024-05-25

## 2024-05-25 LAB
ALBUMIN SERPL BCP-MCNC: 2.4 G/DL (ref 3.5–5.2)
ALP SERPL-CCNC: 151 U/L (ref 55–135)
ALT SERPL W/O P-5'-P-CCNC: 7 U/L (ref 10–44)
ANION GAP SERPL CALC-SCNC: 10 MMOL/L (ref 8–16)
ANISOCYTOSIS BLD QL SMEAR: SLIGHT
AST SERPL-CCNC: 17 U/L (ref 10–40)
BASOPHILS NFR BLD: 1 % (ref 0–1.9)
BILIRUB SERPL-MCNC: 0.4 MG/DL (ref 0.1–1)
BUN SERPL-MCNC: 9 MG/DL (ref 8–23)
CALCIUM SERPL-MCNC: 8.9 MG/DL (ref 8.7–10.5)
CHLORIDE SERPL-SCNC: 93 MMOL/L (ref 95–110)
CO2 SERPL-SCNC: 29 MMOL/L (ref 23–29)
CREAT SERPL-MCNC: 0.6 MG/DL (ref 0.5–1.4)
DIFFERENTIAL METHOD BLD: ABNORMAL
EOSINOPHIL NFR BLD: 0 % (ref 0–8)
ERYTHROCYTE [DISTWIDTH] IN BLOOD BY AUTOMATED COUNT: 14.1 % (ref 11.5–14.5)
EST. GFR  (NO RACE VARIABLE): >60 ML/MIN/1.73 M^2
GLUCOSE SERPL-MCNC: 86 MG/DL (ref 70–110)
HCT VFR BLD AUTO: 37 % (ref 37–48.5)
HGB BLD-MCNC: 12 G/DL (ref 12–16)
IMM GRANULOCYTES # BLD AUTO: ABNORMAL K/UL (ref 0–0.04)
IMM GRANULOCYTES NFR BLD AUTO: ABNORMAL % (ref 0–0.5)
LYMPHOCYTES NFR BLD: 16 % (ref 18–48)
MAGNESIUM SERPL-MCNC: 1.8 MG/DL (ref 1.6–2.6)
MCH RBC QN AUTO: 29 PG (ref 27–31)
MCHC RBC AUTO-ENTMCNC: 32.4 G/DL (ref 32–36)
MCV RBC AUTO: 89 FL (ref 82–98)
METAMYELOCYTES NFR BLD MANUAL: 1 %
MONOCYTES NFR BLD: 5 % (ref 4–15)
NEUTROPHILS NFR BLD: 76 % (ref 38–73)
NEUTS BAND NFR BLD MANUAL: 1 %
NRBC BLD-RTO: 0 /100 WBC
PHOSPHATE SERPL-MCNC: 3 MG/DL (ref 2.7–4.5)
PLATELET # BLD AUTO: 330 K/UL (ref 150–450)
PMV BLD AUTO: 10.2 FL (ref 9.2–12.9)
POLYCHROMASIA BLD QL SMEAR: ABNORMAL
POTASSIUM SERPL-SCNC: 2.8 MMOL/L (ref 3.5–5.1)
PROT SERPL-MCNC: 6.1 G/DL (ref 6–8.4)
RBC # BLD AUTO: 4.14 M/UL (ref 4–5.4)
SODIUM SERPL-SCNC: 132 MMOL/L (ref 136–145)
WBC # BLD AUTO: 9.04 K/UL (ref 3.9–12.7)

## 2024-05-25 PROCEDURE — 25000003 PHARM REV CODE 250

## 2024-05-25 PROCEDURE — 97161 PT EVAL LOW COMPLEX 20 MIN: CPT

## 2024-05-25 PROCEDURE — 83735 ASSAY OF MAGNESIUM: CPT | Performed by: STUDENT IN AN ORGANIZED HEALTH CARE EDUCATION/TRAINING PROGRAM

## 2024-05-25 PROCEDURE — 25000003 PHARM REV CODE 250: Performed by: HOSPITALIST

## 2024-05-25 PROCEDURE — 25000003 PHARM REV CODE 250: Performed by: INTERNAL MEDICINE

## 2024-05-25 PROCEDURE — 11000001 HC ACUTE MED/SURG PRIVATE ROOM

## 2024-05-25 PROCEDURE — 97530 THERAPEUTIC ACTIVITIES: CPT

## 2024-05-25 PROCEDURE — 97165 OT EVAL LOW COMPLEX 30 MIN: CPT

## 2024-05-25 PROCEDURE — 76937 US GUIDE VASCULAR ACCESS: CPT

## 2024-05-25 PROCEDURE — 25000003 PHARM REV CODE 250: Performed by: STUDENT IN AN ORGANIZED HEALTH CARE EDUCATION/TRAINING PROGRAM

## 2024-05-25 PROCEDURE — 99900035 HC TECH TIME PER 15 MIN (STAT)

## 2024-05-25 PROCEDURE — C1751 CATH, INF, PER/CENT/MIDLINE: HCPCS

## 2024-05-25 PROCEDURE — 85007 BL SMEAR W/DIFF WBC COUNT: CPT | Performed by: STUDENT IN AN ORGANIZED HEALTH CARE EDUCATION/TRAINING PROGRAM

## 2024-05-25 PROCEDURE — 80053 COMPREHEN METABOLIC PANEL: CPT | Performed by: STUDENT IN AN ORGANIZED HEALTH CARE EDUCATION/TRAINING PROGRAM

## 2024-05-25 PROCEDURE — 85027 COMPLETE CBC AUTOMATED: CPT | Performed by: STUDENT IN AN ORGANIZED HEALTH CARE EDUCATION/TRAINING PROGRAM

## 2024-05-25 PROCEDURE — 63600175 PHARM REV CODE 636 W HCPCS: Performed by: STUDENT IN AN ORGANIZED HEALTH CARE EDUCATION/TRAINING PROGRAM

## 2024-05-25 PROCEDURE — 36410 VNPNXR 3YR/> PHY/QHP DX/THER: CPT

## 2024-05-25 PROCEDURE — 99233 SBSQ HOSP IP/OBS HIGH 50: CPT | Mod: ,,, | Performed by: INTERNAL MEDICINE

## 2024-05-25 PROCEDURE — 97535 SELF CARE MNGMENT TRAINING: CPT

## 2024-05-25 PROCEDURE — 84100 ASSAY OF PHOSPHORUS: CPT | Performed by: STUDENT IN AN ORGANIZED HEALTH CARE EDUCATION/TRAINING PROGRAM

## 2024-05-25 PROCEDURE — 63600175 PHARM REV CODE 636 W HCPCS: Performed by: HOSPITALIST

## 2024-05-25 PROCEDURE — 36415 COLL VENOUS BLD VENIPUNCTURE: CPT | Performed by: STUDENT IN AN ORGANIZED HEALTH CARE EDUCATION/TRAINING PROGRAM

## 2024-05-25 RX ORDER — SODIUM CHLORIDE 0.9 % (FLUSH) 0.9 %
10 SYRINGE (ML) INJECTION EVERY 6 HOURS
Status: DISCONTINUED | OUTPATIENT
Start: 2024-05-26 | End: 2024-05-29 | Stop reason: HOSPADM

## 2024-05-25 RX ORDER — VALACYCLOVIR HYDROCHLORIDE 500 MG/1
1000 TABLET, FILM COATED ORAL 3 TIMES DAILY
Status: DISCONTINUED | OUTPATIENT
Start: 2024-05-25 | End: 2024-05-29 | Stop reason: HOSPADM

## 2024-05-25 RX ORDER — SODIUM CHLORIDE 0.9 % (FLUSH) 0.9 %
10 SYRINGE (ML) INJECTION
Status: DISCONTINUED | OUTPATIENT
Start: 2024-05-25 | End: 2024-05-29 | Stop reason: HOSPADM

## 2024-05-25 RX ORDER — ONDANSETRON 4 MG/1
8 TABLET, ORALLY DISINTEGRATING ORAL ONCE
Status: COMPLETED | OUTPATIENT
Start: 2024-05-25 | End: 2024-05-25

## 2024-05-25 RX ORDER — GABAPENTIN 300 MG/1
300 CAPSULE ORAL 3 TIMES DAILY
Status: DISCONTINUED | OUTPATIENT
Start: 2024-05-25 | End: 2024-05-29 | Stop reason: HOSPADM

## 2024-05-25 RX ORDER — LIDOCAINE HYDROCHLORIDE 10 MG/ML
1 INJECTION INFILTRATION; PERINEURAL ONCE AS NEEDED
Status: DISCONTINUED | OUTPATIENT
Start: 2024-05-25 | End: 2024-05-29 | Stop reason: HOSPADM

## 2024-05-25 RX ADMIN — ACYCLOVIR SODIUM 480 MG: 50 INJECTION, SOLUTION INTRAVENOUS at 10:05

## 2024-05-25 RX ADMIN — CEFAZOLIN 2 G: 2 INJECTION, POWDER, FOR SOLUTION INTRAMUSCULAR; INTRAVENOUS at 09:05

## 2024-05-25 RX ADMIN — PREDNISOLONE ACETATE 1 DROP: 10 SUSPENSION/ DROPS OPHTHALMIC at 09:05

## 2024-05-25 RX ADMIN — LORAZEPAM 1 MG: 0.5 TABLET ORAL at 09:05

## 2024-05-25 RX ADMIN — GABAPENTIN 200 MG: 100 CAPSULE ORAL at 03:05

## 2024-05-25 RX ADMIN — CARBOXYMETHYLCELLULOSE SODIUM 1 DROP: 10 GEL OPHTHALMIC at 10:05

## 2024-05-25 RX ADMIN — POTASSIUM BICARBONATE 50 MEQ: 978 TABLET, EFFERVESCENT ORAL at 03:05

## 2024-05-25 RX ADMIN — ERYTHROMYCIN: 5 OINTMENT OPHTHALMIC at 10:05

## 2024-05-25 RX ADMIN — PREDNISOLONE ACETATE 1 DROP: 10 SUSPENSION/ DROPS OPHTHALMIC at 05:05

## 2024-05-25 RX ADMIN — VALACYCLOVIR HYDROCHLORIDE 1000 MG: 500 TABLET, FILM COATED ORAL at 04:05

## 2024-05-25 RX ADMIN — ACYCLOVIR SODIUM 480 MG: 50 INJECTION, SOLUTION INTRAVENOUS at 12:05

## 2024-05-25 RX ADMIN — POTASSIUM BICARBONATE 50 MEQ: 978 TABLET, EFFERVESCENT ORAL at 09:05

## 2024-05-25 RX ADMIN — FLUTICASONE PROPIONATE 100 MCG: 50 SPRAY, METERED NASAL at 09:05

## 2024-05-25 RX ADMIN — CARBOXYMETHYLCELLULOSE SODIUM 1 DROP: 10 GEL OPHTHALMIC at 05:05

## 2024-05-25 RX ADMIN — ONDANSETRON 8 MG: 4 TABLET, ORALLY DISINTEGRATING ORAL at 04:05

## 2024-05-25 RX ADMIN — ONDANSETRON 4 MG: 2 INJECTION INTRAMUSCULAR; INTRAVENOUS at 11:05

## 2024-05-25 RX ADMIN — LISINOPRIL 20 MG: 20 TABLET ORAL at 09:05

## 2024-05-25 RX ADMIN — CARBOXYMETHYLCELLULOSE SODIUM 1 DROP: 10 GEL OPHTHALMIC at 09:05

## 2024-05-25 RX ADMIN — OXYCODONE 5 MG: 5 TABLET ORAL at 05:05

## 2024-05-25 RX ADMIN — OXYCODONE 5 MG: 5 TABLET ORAL at 09:05

## 2024-05-25 RX ADMIN — ENOXAPARIN SODIUM 40 MG: 40 INJECTION SUBCUTANEOUS at 04:05

## 2024-05-25 RX ADMIN — ONDANSETRON 4 MG: 2 INJECTION INTRAMUSCULAR; INTRAVENOUS at 09:05

## 2024-05-25 RX ADMIN — ERYTHROMYCIN: 5 OINTMENT OPHTHALMIC at 09:05

## 2024-05-25 RX ADMIN — GABAPENTIN 300 MG: 300 CAPSULE ORAL at 09:05

## 2024-05-25 RX ADMIN — CARVEDILOL 25 MG: 25 TABLET, FILM COATED ORAL at 09:05

## 2024-05-25 RX ADMIN — GABAPENTIN 200 MG: 100 CAPSULE ORAL at 09:05

## 2024-05-25 RX ADMIN — VALACYCLOVIR HYDROCHLORIDE 1000 MG: 500 TABLET, FILM COATED ORAL at 09:05

## 2024-05-25 RX ADMIN — CETIRIZINE HYDROCHLORIDE 5 MG: 5 TABLET, FILM COATED ORAL at 09:05

## 2024-05-25 NOTE — SUBJECTIVE & OBJECTIVE
Interval History: No acute events overnight. Pt remained afebrile on current antibiotics for treatment of septic phlebitis. PICC team consulted for continued abx therapy outpatient.     Review of Systems   Eyes:  Positive for photophobia, pain, discharge and visual disturbance.   Respiratory:  Negative for cough and shortness of breath.    Gastrointestinal:  Negative for abdominal pain, nausea and vomiting.   Skin:  Positive for rash.   Neurological:  Negative for dizziness and light-headedness.     Objective:     Vital Signs (Most Recent):  Temp: 98.4 °F (36.9 °C) (05/25/24 1050)  Pulse: 84 (05/25/24 1050)  Resp: 18 (05/25/24 1050)  BP: (!) 151/70 (05/25/24 1050)  SpO2: (!) 93 % (05/25/24 1050) Vital Signs (24h Range):  Temp:  [97.8 °F (36.6 °C)-98.5 °F (36.9 °C)] 98.4 °F (36.9 °C)  Pulse:  [77-85] 84  Resp:  [17-19] 18  SpO2:  [93 %-95 %] 93 %  BP: (128-167)/(70-83) 151/70     Weight: 70.3 kg (155 lb)  Body mass index is 29.29 kg/m².    Intake/Output Summary (Last 24 hours) at 5/25/2024 1505  Last data filed at 5/25/2024 0200  Gross per 24 hour   Intake 782 ml   Output 450 ml   Net 332 ml         Physical Exam  Vitals and nursing note reviewed.   Constitutional:       General: She is not in acute distress.  HENT:      Head: Normocephalic and atraumatic.      Comments: Dry scabbed-over lesions along the L V1 dermatome    Erythema L eyelid stable  Diffuse neurofibromas present over the body surface     Mouth/Throat:      Mouth: Mucous membranes are moist.   Eyes:      Comments: Eyes able to open more today   Cardiovascular:      Rate and Rhythm: Normal rate and regular rhythm.      Pulses: Normal pulses.      Heart sounds: No murmur heard.  Pulmonary:      Effort: Pulmonary effort is normal. No respiratory distress.      Breath sounds: No wheezing or rales.   Abdominal:      General: Bowel sounds are normal. There is no distension.      Palpations: Abdomen is soft.      Tenderness: There is no abdominal tenderness.    Musculoskeletal:         General: Normal range of motion.      Right lower leg: No edema.      Left lower leg: No edema.   Skin:     General: Skin is warm and dry.      Findings: Lesion and rash present.      Comments:   Scabbed vesicular lesions on left brow  Diffuse neurofibromas    Neurological:      General: No focal deficit present.      Mental Status: She is alert and oriented to person, place, and time.   Psychiatric:         Mood and Affect: Mood normal.         Behavior: Behavior normal.             Significant Labs: All pertinent labs within the past 24 hours have been reviewed.    Significant Imaging: I have reviewed all pertinent imaging results/findings within the past 24 hours.

## 2024-05-25 NOTE — PLAN OF CARE
Problem: Occupational Therapy  Goal: Occupational Therapy Goal  Description: Goals to be met by: 6/22/25     Patient will increase functional independence with ADLs by performing:    UE Dressing with Modified [place].  LE Dressing with Modified [place].  Grooming while standing at sink with Modified [place].  Toileting from toilet with Modified [place] for hygiene and clothing management.   Step transfer with Modified [place]  Toilet transfer to toilet with Modified [place].    Outcome: Progressing

## 2024-05-25 NOTE — PLAN OF CARE
Problem: Adult Inpatient Plan of Care  Goal: Plan of Care Review  Outcome: Progressing  Goal: Patient-Specific Goal (Individualized)  Outcome: Progressing  Goal: Absence of Hospital-Acquired Illness or Injury  Outcome: Progressing  Goal: Optimal Comfort and Wellbeing  Outcome: Progressing  Goal: Readiness for Transition of Care  Outcome: Progressing     Problem: Skin Injury Risk Increased  Goal: Skin Health and Integrity  Outcome: Progressing     Problem: Infection  Goal: Absence of Infection Signs and Symptoms  Outcome: Progressing     Problem: Pain Acute  Goal: Optimal Pain Control and Function  Outcome: Progressing   Pt AAO X 4; able to express needs.  C/o moderate pain .  Good relief with PRN pain medication.  IV out at start of shift.   New IV inserted.    IV ABX and eye drops given as ordered.  Safety maintained.  Bed in low position,  call  light in reach.

## 2024-05-25 NOTE — PT/OT/SLP EVAL
"Occupational Therapy   Evaluation    Name: Jessi Dial  MRN: [id number]  Admitting Diagnosis: Herpes zoster ophthalmicus of left eye  Recent Surgery: * No surgery found *      Recommendations:     Discharge Recommendations: Low Intensity Therapy  Discharge Equipment Recommendations:  none  Barriers to discharge:  None    Assessment:     Jessi Dial is a 68 y.o. female with a medical diagnosis of Herpes zoster ophthalmicus of left eye.  She presents with great discomfort in her eye and nose. Performance deficits affecting function: impaired endurance, impaired functional mobility, gait instability, impaired self care skills, impaired balance, decreased upper extremity function, decreased lower extremity function, decreased safety awareness, impaired cardiopulmonary response to activity, pain, visual deficits, impaired skin.  Patient would benefit from continued skilled acute OT 3x/wk to improve functional mobility, increase independence with ADLs, and address established goals.Recommending low intensity therapy    once medically appropriate for discharge to increase maximal independence, reduce burden of care, and ensure safety.     Rehab Prognosis: Good; patient would benefit from acute skilled OT services to address these deficits and reach maximum level of function.       Plan:     Patient to be seen 3 x/week to address the above listed problems via self-care/home management, therapeutic activities, therapeutic exercises  Plan of Care Expires: 06/28/24  Plan of Care Reviewed with: patient, sibling (sister)    Subjective   " I am in a lot of pain in my eye and my nose is very itchy"  Chief Complaint: Eye and nose pain and discomfort  Patient/Family Comments/goals: Tp d/c home.    Occupational Profile:  Living Environment: Pt lives in [place] in a [facility] with 4-5 [place] (pt has fallen down these steps several times and broken bones), Tub/shower combo, no grab bars  Previous level of function: Ind  Roles and Routines: " Works as a [employer]   Equipment Used at Home: cane, straight, shower chair, wheelchair, bedside commode, walker, rolling  Assistance upon Discharge: Expectation to be at baseline.    Pain/Comfort:  Pain Rating 1: 10/10  Location - Side 1: Left  Location - Orientation 1: generalized  Location 1: eye  Pain Addressed 1: Cessation of Activity, Nurse notified  Pain Rating Post-Intervention 1:  (unrated)  Location - Side 2: Left  Location - Orientation 2: generalized  Location 2: nose  Pain Addressed 2: Nurse notified  Pain Rating Post-Intervention 2:  (not rated)    Patients cultural, spiritual, Mosque conflicts given the current situation: no    Objective:     Communicated with: RN prior to session.  Patient found HOB elevated with peripheral IV, telemetry upon OT entry to room.    General Precautions: Standard, fall, vision impaired  Orthopedic Precautions: N/A  Braces: N/A  Respiratory Status: Room air    Occupational Performance:    Bed Mobility:    Patient completed Rolling/Turning to Left with  stand by assistance  Patient completed Scooting/Bridging with stand by assistance  Patient completed Supine to Sit with stand by assistance  Patient completed Sit to Supine with stand by assistance    Functional Mobility/Transfers:  Patient completed Sit <> Stand Transfer with stand by assistance  with  rolling walker   Functional Mobility: Pt able to take 3 steps forward and back with RW from EOB but expressed dizziness and returned to EOB.    Activities of Daily Living:  Feeding:  modified independence with tray at bedside.  Upper Body Dressing: minimum assistance to tie gown in back EOB  Lower Body Dressing: modified independence don/doff socks EOB  Toileting: contact guard assistance clothing management and hygiene BSC    Cognitive/Visual Perceptual:  Cognitive/Psychosocial Skills:     -       Oriented to: Person, Place, Time, and Situation   -       Follows Commands/attention:Follows multistep  commands  -        Communication: clear/fluent  -       Memory: No Deficits noted  -       Safety awareness/insight to disability: intact   -       Mood/Affect/Coping skills/emotional control: Blunted  Visual/Perceptual:      -Impaired on Left side      Physical Exam:  Balance: -       static/dynamic sit WFL, Static/dynamic stand good with limited duration secondary to dizziness.  Postural examination/scapula alignment:    -       No postural abnormalities identified  Skin integrity: global neurofibromatosis lesions, mottled skin over left side of face  Edema:  None noted  Sensation:    -       Intact  Motor Planning: -       WFL  Dominant hand: -       R  Upper Extremity Range of Motion:     -       Right Upper Extremity: WFL  -       Left Upper Extremity: WFL  Upper Extremity Strength:    -       Right Upper Extremity: WFL  -       Left Upper Extremity: WFL   Strength:    -       Right Upper Extremity: WFL  -       Left Upper Extremity: WFL  Fine Motor Coordination:    -       Intact  Gross motor coordination:   WFL  Neurological: -       WFL    AMPAC 6 Click ADL:  AMPAC Total Score: 21    Treatment & Education:  Role of OT and POC  Safety  ADL retraining  Functional mobility training  Discharge planning  Importance of EOB/OOB activity      Patient left HOB elevated with all lines intact, call button in reach, RN notified, and sister present    GOALS:   Multidisciplinary Problems       Occupational Therapy Goals          Problem: Occupational Therapy    Goal Priority Disciplines Outcome Interventions   Occupational Therapy Goal     OT, PT/OT Progressing    Description: Goals to be met by: 6/22/25     Patient will increase functional independence with ADLs by performing:    UE Dressing with Modified [place].  LE Dressing with Modified [place].  Grooming while standing at sink with Modified [place].  Toileting from toilet with Modified [place] for hygiene and clothing management.   Step transfer with Modified  [place]  Toilet transfer to toilet with Modified [place].                         History:     Past Medical History:   Diagnosis Date    Asthma     COPD (chronic obstructive pulmonary disease)     Neurofibromatosis     Seizures        No past surgical history on file.    Time Tracking:     OT Date of Treatment: 05/25/24  OT Start Time: 1415  OT Stop Time: 1442  OT Total Time (min): 27 min    Billable Minutes:Evaluation 10  Self Care/Home Management 17    5/25/2024

## 2024-05-25 NOTE — ASSESSMENT & PLAN NOTE
PICC required for continued abx therapy upon discharge. Midline team consutled  - F/U on Midline PICC placement, expected 5/26/24

## 2024-05-25 NOTE — PT/OT/SLP EVAL
Physical Therapy Evaluation and Treatment    Patient Name:  Jessi Dial   MRN:  [id number]  Admit Date: 5/18/2024  Admitting Diagnosis:  Herpes zoster ophthalmicus of left eye   Length of Stay: 7 days  Recent Surgery: * No surgery found *      Recommendations:     Discharge Recommendations:   Low Intensity Therapy    Discharge Equipment Recommendations: none   Barriers to discharge: None    Appropriate transfer level with nursing staff: Safe to transfer to bedside chair/bedside commode: stand pivot with 1 person with RW.    Plan:     During this hospitalization, patient to be seen 3 x/week to address the identified rehab impairments via therapeutic activities, gait training, therapeutic exercises, neuromuscular re-education and progress towards the established goals.  Plan of Care Expires:  06/25/24  Plan of Care Reviewed with: patient    Assessment:     Jessi Dial is a 68 y.o. female admitted with a medical diagnosis of Herpes zoster ophthalmicus of left eye. Pt found with HOB elevated agreeable to therapy session. Pt reports ind with mobility PTA with no AD use inside and SPC use PRN outside. Pt with reports of L eye pain resulting in inability to open that eye; reports R eye normal vision. Pt presents today with some BLE weakness and x1 LOB during gait trial requiring CGA for correction but otherwise no LOB requiring SBA for remainder of gait trial with RW. At this time, patient is not functioning at her baseline PLOF and is a fall risk. Patient would benefit from skilled therapy services to maximize safety and independence, increase activity tolerance, decrease fall risk, decrease caregiver burden, improve QOL, improve patient's functional mobility, and decrease risk of contractures and pressure sores. Patient currently demonstrates a need for low intensity therapy on a scheduled basis secondary to a decline in functional status due to illness    Problem List: impaired endurance, weakness, impaired functional  mobility, gait instability, impaired self care skills, impaired balance, decreased upper extremity function, decreased lower extremity function, decreased safety awareness, impaired cardiopulmonary response to activity, pain, visual deficits, impaired skin.  Rehab Prognosis: Good; patient would benefit from acute skilled PT services to address these deficits and reach maximum level of function.      Goals:   Multidisciplinary Problems       Physical Therapy Goals          Problem: Physical Therapy    Goal Priority Disciplines Outcome Goal Variances Interventions   Physical Therapy Goal     PT, PT/OT Progressing     Description: Goals to be met by: 24     Patient will increase functional independence with mobility by performin. Supine to sit with [place]  2. Sit to stand transfer with Modified [place]  3. Bed to chair transfer with Modified [place] using LRAD  4. Gait  x 150 feet with Modified [place] using LRAD.   5. Stand for 10 minutes with Modified [place] using LRAD  6. Lower extremity exercise program x15 reps per handout, with independence                         Subjective     RN notified prior to session. No one present upon PT entrance into room. Patient agreeable to PT evaluation.    Chief Complaint: none stated  Patient/Family Comments/goals: go home  Pain/Comfort:  Pain Rating 1: other (see comments) (unrated)  Location - Side 1: Left  Location - Orientation 1: generalized  Location 1: eye  Pain Addressed 1: Reposition, Distraction  Pain Rating Post-Intervention 1: other (see comments) (unrated)    Social History:  Residence: lives with their spouse 1-story house with ramp entry with handrail. Pt's bathroom has a tub shower with a shower chair.  Support available: family  Equipment Owned (not using): wheelchair, bedside commode, walker, rolling  Equipment Used: straight cane, shower chair  Prior level of function: independent for mobility in the house; mod Ind with  "occasional SPC use when outside; ind with ADLs; working at [employer]; driving; reports 4-5x falls over last 3 months all outside with no SPC use      Patient reports they will have assistance from family upon discharge.    Objective:       Patient found  HOB elevated  with: telemetry     General Precautions: Standard, Cardiac fall, vision impaired, airborne, contact   Orthopedic Precautions:N/A   Braces: N/A   Body mass index is 29.29 kg/m².  Oxygen Device: Room Air  Vitals: BP (!) 151/70 (BP Location: Left arm, Patient Position: Sitting)   Pulse 84   Temp 98.4 °F (36.9 °C) (Oral)   Resp 18   Ht 5' 1" (1.549 m)   Wt 70.3 kg (155 lb)   SpO2 (!) 93%   BMI 29.29 kg/m²       Exams:    Cognition:  Oriented X 4, Alert, and Cooperative  Command following: Follows multistep verbal commands  Communication: clear/fluent    Sensation:   Light touch sensation: Intact BLEs    Gross Motor Coordination: No deficits noted during functional mobility tasks     Edema/Skin Integrity: None noted    Postural examination/scapula alignment: Rounded shoulder and Head forward    Lower Extremity Range of Motion:  Right Lower Extremity: WFL  Left Lower Extremity: WFL    Lower Extremity Strength:  Right Lower Extremity: Deficits: hip flexors 3+/5; knee flexion/extensor and ankle DF 4/5  Left Lower Extremity:  Deficits: hip flexors 3+/5; knee flexion/extensor and ankle DF 4/5      Functional Mobility:    Bed Mobility:  Scooting with supervision  Supine > Sit with supervision    Transfers:   Sit <> Stand Transfer: Stand-by Assistance x 1 trials from EOB, toilet and chair with RW               Gait:  Distance: 10' x4 trials  Assistance level: Stand-by Assistance and Contact Guard Assistance  Assistive Device: rolling walker  Gait Deviation(s): occasional unsteady gait, narrow base of support, flexed posture, and decreased danni  all lines remained intact throughout ambulation trial  Comments: Pt with 1 LOB when taking backwards steps away " from sink requiring CGA for correction; pt steady remainder of trial.     Balance:  Sitting Balance  Static: supervision  Dynamic: supervision  Standing:  Static: stand by assistance  Pt stood at toilet with no UE support for pericare, stood at sink to wash hands, and stood again at sink for oral hygiene with no LOB. Total time spent standing ~7-8 minutes  Dynamic: stand by assistance    Outcome Measures:  AM-PAC 6 CLICK MOBILITY  Turning over in bed (including adjusting bedclothes, sheets and blankets)?: 3  Sitting down on and standing up from a chair with arms (e.g., wheelchair, bedside commode, etc.): 3  Moving from lying on back to sitting on the side of the bed?: 3  Moving to and from a bed to a chair (including a wheelchair)?: 3  Need to walk in hospital room?: 3  Climbing 3-5 steps with a railing?: 3  Basic Mobility Total Score: 18     Education:  Time provided for education, counseling and discussion of health disposition in regards to patient's current status  All questions answered within PT scope of practice and to patient's satisfaction  PT role in POC to address current functional deficits  Call nursing/pct to transfer to chair/use bathroom. Pt stated understanding.    Patient left up in chair with all lines intact and call button in reach.      History:     Past Medical History:   Diagnosis Date    Asthma     COPD (chronic obstructive pulmonary disease)     Neurofibromatosis     Seizures        No past surgical history on file.    No family history on file.    Social History     Socioeconomic History    Marital status:    Tobacco Use    Smoking status: Never    Smokeless tobacco: Never   Substance and Sexual Activity    Alcohol use: No    Drug use: No     Social Determinants of Health     Financial Resource Strain: Low Risk  (5/21/2024)    Overall Financial Resource Strain (CARDIA)     Difficulty of Paying Living Expenses: Not very hard   Food Insecurity: No Food Insecurity (5/21/2024)    Hunger  Vital Sign     Worried About Running Out of Food in the Last Year: Never true     Ran Out of Food in the Last Year: Never true   Transportation Needs: No Transportation Needs (5/21/2024)    TRANSPORTATION NEEDS     Transportation : No   Physical Activity: Inactive (5/21/2024)    Exercise Vital Sign     Days of Exercise per Week: 0 days     Minutes of Exercise per Session: 0 min   Stress: Stress Concern Present (5/21/2024)    [employer] [employer] - Occupational Stress Questionnaire     Feeling of Stress : To some extent   Housing Stability: Low Risk  (5/21/2024)    Housing Stability Vital Sign     Unable to Pay for Housing in the Last Year: No     Homeless in the Last Year: No       Time Tracking:     PT Received On: 05/25/24  PT Start Time: 0818     PT Stop Time: 0841  PT Total Time (min): 23 min     Billable Minutes: Evaluation 10 minutes and Therapeutic Activity 13 minutes    5/25/2024

## 2024-05-25 NOTE — SUBJECTIVE & OBJECTIVE
Interval History: Sitting in chair. Feeling better. Going to inpatient rehab in [place], per patient.    Review of Systems   Skin:  Positive for rash.   All other systems reviewed and are negative.    Objective:     Vital Signs (Most Recent):  Temp: 98.4 °F (36.9 °C) (05/25/24 1050)  Pulse: 84 (05/25/24 1050)  Resp: 18 (05/25/24 1050)  BP: (!) 151/70 (05/25/24 1050)  SpO2: (!) 93 % (05/25/24 1050) Vital Signs (24h Range):  Temp:  [97.8 °F (36.6 °C)-99 °F (37.2 °C)] 98.4 °F (36.9 °C)  Pulse:  [77-88] 84  Resp:  [17-19] 18  SpO2:  [93 %-95 %] 93 %  BP: (128-177)/(69-83) 151/70     Weight: 70.3 kg (155 lb)  Body mass index is 29.29 kg/m².    Estimated Creatinine Clearance: 81.6 mL/min (based on SCr of 0.6 mg/dL).     Physical Exam  Vitals and nursing note reviewed.   Constitutional:       General: She is not in acute distress.     Appearance: Normal appearance. She is not ill-appearing, toxic-appearing or diaphoretic.   HENT:      Head: Normocephalic and atraumatic.   Eyes:      Extraocular Movements: Extraocular movements intact.      Pupils: Pupils are equal, round, and reactive to light.   Skin:     Comments: Shingles all crusted over   Neurological:      Mental Status: She is alert.   Psychiatric:         Mood and Affect: Mood normal.         Behavior: Behavior normal.         Thought Content: Thought content normal.         Judgment: Judgment normal.          Significant Labs: CBC:   Recent Labs   Lab 05/25/24 0418   WBC 9.04   HGB 12.0   HCT 37.0        Microbiology Results (last 7 days)       Procedure Component Value Units Date/Time    Blood culture [[id number]] Collected: 05/23/24 0412    Order Status: Completed Specimen: Blood Updated: 05/25/24 0812     Blood Culture, Routine No Growth to date      No Growth to date      No Growth to date    Blood culture [[id number]] Collected: 05/23/24 0416    Order Status: Completed Specimen: Blood Updated: 05/25/24 0812     Blood Culture, Routine No Growth to  date      No Growth to date      No Growth to date    Blood culture [[id number]]  (Abnormal) Collected: 05/21/24 1151    Order Status: Completed Specimen: Blood Updated: 05/24/24 1219     Blood Culture, Routine Gram stain linda bottle: Gram positive cocci in clusters resembling Staph      Results called to and read back by: Glendy Hedrick RN 05/22/2024  08:53      Gram stain aer bottle: Gram positive cocci in clusters resembling Staph      Positive results previously called 05/23/2024  02:30      STAPHYLOCOCCUS AUREUS  ID consult required at [facility].  For susceptibility see order #[id number]      Blood culture [[id number]]  (Abnormal)  (Susceptibility) Collected: 05/21/24 1150    Order Status: Completed Specimen: Blood Updated: 05/24/24 1218     Blood Culture, Routine Gram stain aer bottle: Gram positive cocci in clusters resembling Staph      Results called to and read back by:Marisa Zepeda LPN 05/22/2024  05:52      STAPHYLOCOCCUS AUREUS  ID consult required at [facility].      MRSA/SA Rapid ID by PCR from Blood culture [[id number]]  (Abnormal) Collected: 05/21/24 1150    Order Status: Completed Updated: 05/22/24 0723     Staph aureus ID by PCR Positive     Methicillin Resistant ID by PCR Negative            Significant Imaging: I have reviewed all pertinent imaging results/findings within the past 24 hours.

## 2024-05-25 NOTE — NURSING
Spoke to Fabián with phlebotomy and labs were not drawn today due to staffing shorting. Labs will be drawn in am. Night nurse Ambar stark.

## 2024-05-25 NOTE — ASSESSMENT & PLAN NOTE
Pt tested positive for Herpes Zoster infection. Droplet-Contact precautions ordered. Treatment with IV Acyclovir commenced.   - Remove isolation precautions after all lesions are crusted over and remain dry

## 2024-05-25 NOTE — ASSESSMENT & PLAN NOTE
Developed fever of 100.6 on 5/21, blood cultures drawn and growing MSSA. Currently on cefazolin.   US confirms septic thrombophlebitis of the right basilic and cephalic arteries.    Recommendations  Continue cefazolin x 28 days from blood culture sterility  Needs PICC  EOC: 06/20/2024

## 2024-05-25 NOTE — PROGRESS NOTES
[facility] - [facility] Surg ([place] [address])  Infectious Disease  Progress Note    Patient Name: Jessi Dial  MRN: [id number]  Admission Date: 5/18/2024  Length of Stay: 7 days  Attending Physician: Hernando Amos MD  Primary Care Provider: Oscar Shafer MD    Isolation Status: No active isolations  Assessment/Plan:        * Herpes zoster ophthalmicus of left eye  68F with h/o asthma, COPD, seizures, and neurofibromatosis admitted 5/18 as transfer from [facility] for optho exam, being treated for herpes ophthalmicus. Pt not known to be immunocompromised. All lesions crusted over.    Recommendations:   switch to PO valtrex 1g TID on discharge  Can stop isolation  Duration at least 14d (EOC~6/1), but will need ophtho follow up to determine final duration depending on resolution of lesions on eye exam      MSSA bacteremia  Developed fever of 100.6 on 5/21, blood cultures drawn and growing MSSA. Currently on cefazolin.   US confirms septic thrombophlebitis of the right basilic and cephalic arteries.    Recommendations  Continue cefazolin x 28 days from blood culture sterility  Needs PICC  EOC: 06/20/2024      Thank you for your consult. I will sign off. Please contact us if you have any additional questions.    Rohan Cuello MD  Infectious Disease  [facility] Surg ([place] [address])    Subjective:     Principal Problem:Herpes zoster ophthalmicus of left eye    HPI: Ms. Dial is a 68F with PMH of asthma, COPD, seizures, and neurofibromatosis admitted 5/18 as transfer from [facility] for optho exam. Pt reports approx 3 weeks of pain at and above L eye. Says she can't open her L eye without physically holding it upon, which has been going on about 1 month. But says she's able to see clearly when eyes held open. Denies f/c. Reports some drainage to L eye. Reports recently having some pain under b/l breasts and thinks she may have had a rash, but improved. Denies other new skin lesions or other areas of pain. Denies  "hearing loss or ear pain. Had been on valtrex outpatient and taking gabapentin for pain. Took po valtrex 1 tablet (1,000 mg total) by mouth 3 (three) times daily. for 10 days (4/26 - 5/6)    Has been seen by optho- noted "VA 20/30, IOP good, pupil fixed at approx 3 mm, AC with trace cell, K with few inferior KP's and diffuse PEE's. No carrington dendrite/pseudodendrites. Dilated exam without evidence of retinal necrosis (PVD present OU, which could explain floaters)"    Optho recommending iv acyclovir steroid drops and id consult    On last ID evaluation, plan was to continue IV acyclovir while inpatient, and to transition to PO valtrex 1gm tid (normal renal function) for tentatively 14 days, but dependent on clinical improvement and resolution of eye lesions.    Infectious disease now re-consulted for "Herpes zoster ophthalmicus now with GPC bactermeia, rapid ID showing MSSA. Antibiotic recs?".   Interval History: Sitting in chair. Feeling better. Going to inpatient rehab in [place], per patient.    Review of Systems   Skin:  Positive for rash.   All other systems reviewed and are negative.    Objective:     Vital Signs (Most Recent):  Temp: 98.4 °F (36.9 °C) (05/25/24 1050)  Pulse: 84 (05/25/24 1050)  Resp: 18 (05/25/24 1050)  BP: (!) 151/70 (05/25/24 1050)  SpO2: (!) 93 % (05/25/24 1050) Vital Signs (24h Range):  Temp:  [97.8 °F (36.6 °C)-99 °F (37.2 °C)] 98.4 °F (36.9 °C)  Pulse:  [77-88] 84  Resp:  [17-19] 18  SpO2:  [93 %-95 %] 93 %  BP: (128-177)/(69-83) 151/70     Weight: 70.3 kg (155 lb)  Body mass index is 29.29 kg/m².    Estimated Creatinine Clearance: 81.6 mL/min (based on SCr of 0.6 mg/dL).     Physical Exam  Vitals and nursing note reviewed.   Constitutional:       General: She is not in acute distress.     Appearance: Normal appearance. She is not ill-appearing, toxic-appearing or diaphoretic.   HENT:      Head: Normocephalic and atraumatic.   Eyes:      Extraocular Movements: Extraocular movements " intact.      Pupils: Pupils are equal, round, and reactive to light.   Skin:     Comments: Shingles all crusted over   Neurological:      Mental Status: She is alert.   Psychiatric:         Mood and Affect: Mood normal.         Behavior: Behavior normal.         Thought Content: Thought content normal.         Judgment: Judgment normal.          Significant Labs: CBC:   Recent Labs   Lab 05/25/24 0418   WBC 9.04   HGB 12.0   HCT 37.0        Microbiology Results (last 7 days)       Procedure Component Value Units Date/Time    Blood culture [[id number]] Collected: 05/23/24 0412    Order Status: Completed Specimen: Blood Updated: 05/25/24 0812     Blood Culture, Routine No Growth to date      No Growth to date      No Growth to date    Blood culture [[id number]] Collected: 05/23/24 0416    Order Status: Completed Specimen: Blood Updated: 05/25/24 0812     Blood Culture, Routine No Growth to date      No Growth to date      No Growth to date    Blood culture [[id number]]  (Abnormal) Collected: 05/21/24 1151    Order Status: Completed Specimen: Blood Updated: 05/24/24 1219     Blood Culture, Routine Gram stain linda bottle: Gram positive cocci in clusters resembling Staph      Results called to and read back by: Glendy Hedrick RN 05/22/2024  08:53      Gram stain aer bottle: Gram positive cocci in clusters resembling Staph      Positive results previously called 05/23/2024  02:30      STAPHYLOCOCCUS AUREUS  ID consult required at [facility] and [facility].  For susceptibility see order #[id number]      Blood culture [[id number]]  (Abnormal)  (Susceptibility) Collected: 05/21/24 1150    Order Status: Completed Specimen: Blood Updated: 05/24/24 1218     Blood Culture, Routine Gram stain aer bottle: Gram positive cocci in clusters resembling Staph      Results called to and read back by:Marisa Zepeda LPN 05/22/2024  05:52      STAPHYLOCOCCUS AUREUS  ID consult required at [facility].[address] [place]  locations.      MRSA/SA Rapid ID by PCR from Blood culture [[id number]]  (Abnormal) Collected: 05/21/24 1150    Order Status: Completed Updated: 05/22/24 0723     Staph aureus ID by PCR Positive     Methicillin Resistant ID by PCR Negative            Significant Imaging: I have reviewed all pertinent imaging results/findings within the past 24 hours.

## 2024-05-25 NOTE — NURSING
Notified Dr. Alexandre Castillo patient postponed the 12 noon dose of K and is still asking to hold off until she can get another dose of zofran. . due for zofran again at 1550. . she's also going to be due for another K dose at 4p so running behind with K for now. Also informed that patient refused several eye gttts reporting too painful. Informed that patient pulled out IV last night and just pulled this iv out also.patient exhibits anxiety too, she keeps her eyes(both) squinted closed therefore she can't see what she's doing because eyes are squinted tight. Patient requested gabapentin be increased back to 300mg. Dr Castillo did increase gabapentin. Patient refused afternoon eye gtss reporting too painful.  [place] awaiting PICC placement.     Patient also was refusing oxy 5mg because reports drowsiness but about 5ish agreed to take oxy 5mg for pain..     Patient family has been at bedside for several hours, asking questions and messaging family friend who sister states is an Im physician and holistic provider. At times sister seemed to encourage patient ok not to take eye gtts. Seems there is a lack of understanding, and reiterated importance of compliance, and ok to take pain med if needed.  Patient and family verbalized understanding.      Patient does has open scalp wounds, some crusted, some blistered. Sister washed patients hair this evening. Patient isolation precautions was lifted this am , because  of crusting but not all wound on scalp are crusted.

## 2024-05-25 NOTE — PLAN OF CARE
Problem: Adult Inpatient Plan of Care  Goal: Plan of Care Review  5/25/2024 0624 by Marisa Zepeda, LPN  Outcome: Progressing  5/25/2024 0222 by Marisa Zepeda, LPN  Outcome: Progressing  Goal: Patient-Specific Goal (Individualized)  5/25/2024 0624 by Marisa Zepeda, LPN  Outcome: Progressing  5/25/2024 0222 by Marisa Zepeda, LPN  Outcome: Progressing  Goal: Absence of Hospital-Acquired Illness or Injury  5/25/2024 0624 by Marisa Zepeda, LPN  Outcome: Progressing  5/25/2024 0222 by Marisa Zepeda, LPN  Outcome: Progressing  Goal: Optimal Comfort and Wellbeing  5/25/2024 0624 by Marisa Zepeda, LPN  Outcome: Progressing  5/25/2024 0222 by Marisa Zepeda, LPN  Outcome: Progressing  Goal: Readiness for Transition of Care  5/25/2024 0624 by Marisa Zepeda, LPN  Outcome: Progressing  5/25/2024 0222 by Marisa Zepeda, LPN  Outcome: Progressing     Problem: Skin Injury Risk Increased  Goal: Skin Health and Integrity  5/25/2024 0624 by Marisa Zepeda, LPN  Outcome: Progressing  5/25/2024 0222 by Marisa Zepeda, LPN  Outcome: Progressing     Problem: Infection  Goal: Absence of Infection Signs and Symptoms  5/25/2024 0624 by Marisa Zepeda, LPN  Outcome: Progressing  5/25/2024 0222 by Marisa Zepeda, LPN  Outcome: Progressing     Problem: Pain Acute  Goal: Optimal Pain Control and Function  5/25/2024 0624 by Marisa Zepeda, LPN  Outcome: Progressing  5/25/2024 0222 by Marisa Zepeda, LPN  Outcome: Progressing     Problem: Pain Acute  Goal: Optimal Pain Control and Function  5/25/2024 0624 by Marisa Zepeda, LPN  Outcome: Progressing  5/25/2024 0222 by Marisa Zepeda, LPN  Outcome: Progressing

## 2024-05-25 NOTE — ASSESSMENT & PLAN NOTE
68F with h/o asthma, COPD, seizures, and neurofibromatosis admitted 5/18 as transfer from [facility] for optho exam, being treated for herpes ophthalmicus. Pt not known to be immunocompromised. All lesions crusted over.    Recommendations:   switch to PO valtrex 1g TID on discharge  Can stop isolation  Duration at least 14d (EOC~6/1), but will need ophtho follow up to determine final duration depending on resolution of lesions on eye exam

## 2024-05-25 NOTE — PROGRESS NOTES
[facility] - Med Surg ([address] Medicine  Progress Note    Patient Name: Jessi Dial  MRN: [id number]  Patient Class: IP- Inpatient   Admission Date: 5/18/2024  Length of Stay: 7 days  Attending Physician: Hernando Amos MD  Primary Care Provider: Oscar Shafer MD        Subjective:     Principal Problem:Herpes zoster ophthalmicus of left eye        HPI:  Jessi Dial onesimo  69 yo W w/ PMH of asthma, COPD, seizures, and neurofibromatosis who presented to [facility] ED for persistent pain tot he left side of her face and was transferred to [facility] for inpatient ophthalmology evaluation. She reports that about 5 weeks ago she started having pain b/l below her breasts that later developed into an itchy and painful foul-smelling rash. This has been improving. She presented initially to the ED on 04/23 for L-sided headache and ear pain with N/V and was treated and discharged. She then presented to [facility] ED on 04/26 for a rash with an associated burning sensation that covered the L forehead and upper eyelid. The ED provider noted that she had no Pemberton sign and fluorescein staining of the L eye showed no dendritic lesions. She was discharged with valacyclovir and prednisolone eye drops but presented to the ED again yesterday as the pain had continued to persist despite the medications. She was followed outpatient by her PCP and an ophthalmologist. The vesicular lesions have begun to crust over but she is continuing to have significant pain over her L eye and struggles to keep it open. She reports that she was started on gabapentin outpatient for additional pain control and that it initially made her drowsy and confused but that those symptoms resolved as she continued taking it. Reports vision changes due to eye discharge and pain. Denies fever, chills, hearing changes, abdominal pain, shortness of breath, cough, chest pain.    In the OSH ED, she was hypertensive and tachycardic,  afebrile. Labs notable for leukocytosis to 13.11 and Na 134. CTH without evidence of acute intracranial abnormality but notable for numerous scalp neurofibromas. Received a dose of IV acyclovir. Transferred here for further evaluation and management.     Overview/Hospital Course:  Admitted to hospital medicine for herpes zoster ophthalmicus. Started on IV acyclovir. Ophthalmology consulted and added several eye drops. ID consulted with recs to transition to PO valtrex for 14 days upon discharge. Swelling and vision with mild improvement noted on 5/20/24 but significant pain still present. Became febrile and tachycardic with a new leukocytosis on 05/21, started on vanc + rocephin. CXR without an acute intrathoracic process. BCx growing GPCs and rapid ID showing MSSA, deescalated to ancef. CT maxillofacial showing mild paranasal sinus disease with aerated secretions in L sphenoid sinus and innumerable cutaneous nodules consistent with neurofibromatosis. ID re-consulted for new bacteremia. Repeat BCx NGTD. RUE US showing thrombophlebitis. TTE with EF 60-65% and without valvular vegetations. Zoster lesions visualized to be scabbed over and dry, isolation precautions discontinued. PICC consult ordered for continued IV abx therapy.    Interval History: No acute events overnight. Pt remained afebrile on current antibiotics for treatment of septic phlebitis. PICC team consulted for continued abx therapy outpatient.     Review of Systems   Eyes:  Positive for photophobia, pain, discharge and visual disturbance.   Respiratory:  Negative for cough and shortness of breath.    Gastrointestinal:  Negative for abdominal pain, nausea and vomiting.   Skin:  Positive for rash.   Neurological:  Negative for dizziness and light-headedness.     Objective:     Vital Signs (Most Recent):  Temp: 98.4 °F (36.9 °C) (05/25/24 1050)  Pulse: 84 (05/25/24 1050)  Resp: 18 (05/25/24 1050)  BP: (!) 151/70 (05/25/24 1050)  SpO2: (!) 93 % (05/25/24  1050) Vital Signs (24h Range):  Temp:  [97.8 °F (36.6 °C)-98.5 °F (36.9 °C)] 98.4 °F (36.9 °C)  Pulse:  [77-85] 84  Resp:  [17-19] 18  SpO2:  [93 %-95 %] 93 %  BP: (128-167)/(70-83) 151/70     Weight: 70.3 kg (155 lb)  Body mass index is 29.29 kg/m².    Intake/Output Summary (Last 24 hours) at 5/25/2024 1505  Last data filed at 5/25/2024 0200  Gross per 24 hour   Intake 782 ml   Output 450 ml   Net 332 ml         Physical Exam  Vitals and nursing note reviewed.   Constitutional:       General: She is not in acute distress.  HENT:      Head: Normocephalic and atraumatic.      Comments: Dry scabbed-over lesions along the L V1 dermatome    Erythema L eyelid stable  Diffuse neurofibromas present over the body surface     Mouth/Throat:      Mouth: Mucous membranes are moist.   Eyes:      Comments: Eyes able to open more today   Cardiovascular:      Rate and Rhythm: Normal rate and regular rhythm.      Pulses: Normal pulses.      Heart sounds: No murmur heard.  Pulmonary:      Effort: Pulmonary effort is normal. No respiratory distress.      Breath sounds: No wheezing or rales.   Abdominal:      General: Bowel sounds are normal. There is no distension.      Palpations: Abdomen is soft.      Tenderness: There is no abdominal tenderness.   Musculoskeletal:         General: Normal range of motion.      Right lower leg: No edema.      Left lower leg: No edema.   Skin:     General: Skin is warm and dry.      Findings: Lesion and rash present.      Comments:   Scabbed vesicular lesions on left brow  Diffuse neurofibromas    Neurological:      General: No focal deficit present.      Mental Status: She is alert and oriented to person, place, and time.   Psychiatric:         Mood and Affect: Mood normal.         Behavior: Behavior normal.             Significant Labs: All pertinent labs within the past 24 hours have been reviewed.    Significant Imaging: I have reviewed all pertinent imaging results/findings within the past 24  hours.    Assessment/Plan:      * Herpes zoster ophthalmicus of left eye  3-4 week history of vesicular lesions and pain extending along the L V1 dermatome including the L eyelid. She reports L eye discharge and pain that impacts her vision. She was seen in the ED on 04/26 for this and followed outpatient by her PCP and ophthalmologist. Was taking Valtrex and using steroid eye drops, but the pain and rash persisted. Transferred here for inpatient ophthalmology evaluation. Vision impaired by discharge and presence of rash and neurofibromas on L eyelid, but otherwise no vision loss. Became febrile and tachycardic with new leukocytosis on 05/21.    - Ophthalmology consulted, recommended additional eye drop medications - prednisolone 4x/day, atropine, erythromycin, and artifical tears  - Benadryl and topical acyclovir for pruritis  - ID consulted with recs to continue IV acyclovir 10mg/kg q8h & transition to PO valtrex 1gm tid   - Decreasing gabapentin to 200mg TID, Tylenol and oxy 5mg prn for pain  - F/u with ophthalmology in [place] within 2 weeks of discharge - discuss date and time with patient    Infection requiring contact isolation precautions  Pt tested positive for Herpes Zoster infection. Droplet-Contact precautions ordered. Treatment with IV Acyclovir commenced.   - Remove isolation precautions after all lesions are crusted over and remain dry    Needs peripherally inserted central catheter (PICC)  PICC required for continued abx therapy upon discharge. Midline team consutled  - F/U on Midline PICC placement, expected 5/26/24    MSSA bacteremia  On 05/21, noted to have tachycardia, fever, and new leukocytosis. CXR without acute intracranial process. BCx growing GPCs, rapid ID showing MSSA. CT maxillofacial showing mild paranasal sinus disease with aerated secretions in L sphenoid sinus and innumerable cutaneous nodules consistent with neurofibromatosis. RUE US notable for superficial thrombophlebitis of R  basilic and cephalic veins. TTE (05/24/24) showing normal EF and no valvular vegetations.    - Continue ancef, will need PICC line prior to discharge  - ID following, recommending 4 weeks of IV ancef following BCx clearance  - F/u repeat BCx     Elevated BP without diagnosis of hypertension  - Continue lisinopril to 20mg and coreg to 25mg BID  - Trend BP and labs    Anxiety  Continuing home ativan      Neurofibromatosis  Hx of neurofibromatosis      Asthma  Continue home inhaler        VTE Risk Mitigation (From admission, onward)           Ordered     enoxaparin injection 40 mg  Every 24 hours         05/23/24 1614     IP VTE LOW RISK PATIENT  Once         05/18/24 1505     Place sequential compression device  Until discontinued         05/18/24 1505                    Discharge Planning   AJ: 5/25/2024     Code Status: Full Code   Is the patient medically ready for discharge?:     Reason for patient still in hospital (select all that apply): Treatment  Discharge Plan A: Skilled Nursing Facility                  Alexandre Castillo MD  Department of Hospital Medicine   [facility] - [facility] Surg ([place]

## 2024-05-25 NOTE — PLAN OF CARE
PT evaluation completed- see note for details. PT POC and goals established.    Problem: Physical Therapy  Goal: Physical Therapy Goal  Description: Goals to be met by: 24     Patient will increase functional independence with mobility by performin. Supine to sit with [place]  2. Sit to stand transfer with Modified [place]  3. Bed to chair transfer with Modified [place] using LRAD  4. Gait  x 150 feet with Modified [place] using LRAD.   5. Stand for 10 minutes with Modified [place] using LRAD  6. Lower extremity exercise program x15 reps per handout, with independence    Outcome: Progressing

## 2024-05-26 LAB
ALBUMIN SERPL BCP-MCNC: 2.6 G/DL (ref 3.5–5.2)
ALP SERPL-CCNC: 143 U/L (ref 55–135)
ALT SERPL W/O P-5'-P-CCNC: 7 U/L (ref 10–44)
ANION GAP SERPL CALC-SCNC: 8 MMOL/L (ref 8–16)
ANISOCYTOSIS BLD QL SMEAR: SLIGHT
AST SERPL-CCNC: 22 U/L (ref 10–40)
BASOPHILS NFR BLD: 1 % (ref 0–1.9)
BILIRUB SERPL-MCNC: 0.4 MG/DL (ref 0.1–1)
BUN SERPL-MCNC: 11 MG/DL (ref 8–23)
CALCIUM SERPL-MCNC: 9.3 MG/DL (ref 8.7–10.5)
CHLORIDE SERPL-SCNC: 91 MMOL/L (ref 95–110)
CO2 SERPL-SCNC: 32 MMOL/L (ref 23–29)
CREAT SERPL-MCNC: 0.9 MG/DL (ref 0.5–1.4)
DIFFERENTIAL METHOD BLD: ABNORMAL
EOSINOPHIL NFR BLD: 2 % (ref 0–8)
ERYTHROCYTE [DISTWIDTH] IN BLOOD BY AUTOMATED COUNT: 14.3 % (ref 11.5–14.5)
EST. GFR  (NO RACE VARIABLE): >60 ML/MIN/1.73 M^2
GLUCOSE SERPL-MCNC: 85 MG/DL (ref 70–110)
HCT VFR BLD AUTO: 36.4 % (ref 37–48.5)
HGB BLD-MCNC: 12 G/DL (ref 12–16)
IMM GRANULOCYTES # BLD AUTO: ABNORMAL K/UL (ref 0–0.04)
IMM GRANULOCYTES NFR BLD AUTO: ABNORMAL % (ref 0–0.5)
LYMPHOCYTES NFR BLD: 24 % (ref 18–48)
MAGNESIUM SERPL-MCNC: 1.5 MG/DL (ref 1.6–2.6)
MCH RBC QN AUTO: 29.5 PG (ref 27–31)
MCHC RBC AUTO-ENTMCNC: 33 G/DL (ref 32–36)
MCV RBC AUTO: 89 FL (ref 82–98)
MONOCYTES NFR BLD: 11 % (ref 4–15)
MYELOCYTES NFR BLD MANUAL: 1 %
NEUTROPHILS NFR BLD: 59 % (ref 38–73)
NEUTS BAND NFR BLD MANUAL: 2 %
NRBC BLD-RTO: 0 /100 WBC
PHOSPHATE SERPL-MCNC: 2.2 MG/DL (ref 2.7–4.5)
PLATELET # BLD AUTO: 384 K/UL (ref 150–450)
PMV BLD AUTO: 10.4 FL (ref 9.2–12.9)
POLYCHROMASIA BLD QL SMEAR: ABNORMAL
POTASSIUM SERPL-SCNC: 4.4 MMOL/L (ref 3.5–5.1)
PROT SERPL-MCNC: 6.3 G/DL (ref 6–8.4)
RBC # BLD AUTO: 4.07 M/UL (ref 4–5.4)
SODIUM SERPL-SCNC: 131 MMOL/L (ref 136–145)
WBC # BLD AUTO: 10.71 K/UL (ref 3.9–12.7)

## 2024-05-26 PROCEDURE — 25000003 PHARM REV CODE 250: Performed by: HOSPITALIST

## 2024-05-26 PROCEDURE — 63600175 PHARM REV CODE 636 W HCPCS

## 2024-05-26 PROCEDURE — 11000001 HC ACUTE MED/SURG PRIVATE ROOM

## 2024-05-26 PROCEDURE — 85027 COMPLETE CBC AUTOMATED: CPT | Performed by: STUDENT IN AN ORGANIZED HEALTH CARE EDUCATION/TRAINING PROGRAM

## 2024-05-26 PROCEDURE — 25000003 PHARM REV CODE 250

## 2024-05-26 PROCEDURE — 94761 N-INVAS EAR/PLS OXIMETRY MLT: CPT

## 2024-05-26 PROCEDURE — 83735 ASSAY OF MAGNESIUM: CPT | Performed by: STUDENT IN AN ORGANIZED HEALTH CARE EDUCATION/TRAINING PROGRAM

## 2024-05-26 PROCEDURE — 80053 COMPREHEN METABOLIC PANEL: CPT | Performed by: STUDENT IN AN ORGANIZED HEALTH CARE EDUCATION/TRAINING PROGRAM

## 2024-05-26 PROCEDURE — A4216 STERILE WATER/SALINE, 10 ML: HCPCS | Performed by: STUDENT IN AN ORGANIZED HEALTH CARE EDUCATION/TRAINING PROGRAM

## 2024-05-26 PROCEDURE — 63600175 PHARM REV CODE 636 W HCPCS: Performed by: HOSPITALIST

## 2024-05-26 PROCEDURE — 25000003 PHARM REV CODE 250: Performed by: STUDENT IN AN ORGANIZED HEALTH CARE EDUCATION/TRAINING PROGRAM

## 2024-05-26 PROCEDURE — 63600175 PHARM REV CODE 636 W HCPCS: Performed by: STUDENT IN AN ORGANIZED HEALTH CARE EDUCATION/TRAINING PROGRAM

## 2024-05-26 PROCEDURE — 82180 ASSAY OF ASCORBIC ACID: CPT | Performed by: STUDENT IN AN ORGANIZED HEALTH CARE EDUCATION/TRAINING PROGRAM

## 2024-05-26 PROCEDURE — 94799 UNLISTED PULMONARY SVC/PX: CPT

## 2024-05-26 PROCEDURE — 25000003 PHARM REV CODE 250: Performed by: INTERNAL MEDICINE

## 2024-05-26 PROCEDURE — 84100 ASSAY OF PHOSPHORUS: CPT | Performed by: STUDENT IN AN ORGANIZED HEALTH CARE EDUCATION/TRAINING PROGRAM

## 2024-05-26 PROCEDURE — 85007 BL SMEAR W/DIFF WBC COUNT: CPT | Performed by: STUDENT IN AN ORGANIZED HEALTH CARE EDUCATION/TRAINING PROGRAM

## 2024-05-26 PROCEDURE — 36415 COLL VENOUS BLD VENIPUNCTURE: CPT | Performed by: STUDENT IN AN ORGANIZED HEALTH CARE EDUCATION/TRAINING PROGRAM

## 2024-05-26 RX ORDER — ERGOCALCIFEROL 1.25 MG/1
50000 CAPSULE ORAL
Status: DISCONTINUED | OUTPATIENT
Start: 2024-05-26 | End: 2024-05-29 | Stop reason: HOSPADM

## 2024-05-26 RX ORDER — MAGNESIUM SULFATE HEPTAHYDRATE 40 MG/ML
2 INJECTION, SOLUTION INTRAVENOUS
Status: DISPENSED | OUTPATIENT
Start: 2024-05-26 | End: 2024-05-26

## 2024-05-26 RX ORDER — SODIUM,POTASSIUM PHOSPHATES 280-250MG
2 POWDER IN PACKET (EA) ORAL ONCE
Status: COMPLETED | OUTPATIENT
Start: 2024-05-26 | End: 2024-05-26

## 2024-05-26 RX ADMIN — Medication 10 ML: at 05:05

## 2024-05-26 RX ADMIN — ATROPINE SULFATE 1 DROP: 10 SOLUTION/ DROPS OPHTHALMIC at 10:05

## 2024-05-26 RX ADMIN — CARVEDILOL 25 MG: 25 TABLET, FILM COATED ORAL at 09:05

## 2024-05-26 RX ADMIN — ERYTHROMYCIN: 5 OINTMENT OPHTHALMIC at 09:05

## 2024-05-26 RX ADMIN — FLUTICASONE PROPIONATE 100 MCG: 50 SPRAY, METERED NASAL at 10:05

## 2024-05-26 RX ADMIN — ERYTHROMYCIN: 5 OINTMENT OPHTHALMIC at 06:05

## 2024-05-26 RX ADMIN — CARBOXYMETHYLCELLULOSE SODIUM 1 DROP: 10 GEL OPHTHALMIC at 01:05

## 2024-05-26 RX ADMIN — CARBOXYMETHYLCELLULOSE SODIUM 1 DROP: 10 GEL OPHTHALMIC at 05:05

## 2024-05-26 RX ADMIN — ERGOCALCIFEROL 50000 UNITS: 1.25 CAPSULE ORAL at 01:05

## 2024-05-26 RX ADMIN — GABAPENTIN 300 MG: 300 CAPSULE ORAL at 06:05

## 2024-05-26 RX ADMIN — Medication 10 ML: at 12:05

## 2024-05-26 RX ADMIN — LISINOPRIL 20 MG: 20 TABLET ORAL at 10:05

## 2024-05-26 RX ADMIN — ERYTHROMYCIN: 5 OINTMENT OPHTHALMIC at 10:05

## 2024-05-26 RX ADMIN — FLUTICASONE FUROATE AND VILANTEROL TRIFENATATE 1 PUFF: 100; 25 POWDER RESPIRATORY (INHALATION) at 10:05

## 2024-05-26 RX ADMIN — CARBOXYMETHYLCELLULOSE SODIUM 1 DROP: 10 GEL OPHTHALMIC at 09:05

## 2024-05-26 RX ADMIN — ENOXAPARIN SODIUM 40 MG: 40 INJECTION SUBCUTANEOUS at 05:05

## 2024-05-26 RX ADMIN — VALACYCLOVIR HYDROCHLORIDE 1000 MG: 500 TABLET, FILM COATED ORAL at 09:05

## 2024-05-26 RX ADMIN — CEFAZOLIN 2 G: 2 INJECTION, POWDER, FOR SOLUTION INTRAMUSCULAR; INTRAVENOUS at 12:05

## 2024-05-26 RX ADMIN — OXYCODONE 5 MG: 5 TABLET ORAL at 09:05

## 2024-05-26 RX ADMIN — CARBOXYMETHYLCELLULOSE SODIUM 1 DROP: 10 GEL OPHTHALMIC at 10:05

## 2024-05-26 RX ADMIN — PREDNISOLONE ACETATE 1 DROP: 10 SUSPENSION/ DROPS OPHTHALMIC at 10:05

## 2024-05-26 RX ADMIN — PREDNISOLONE ACETATE 1 DROP: 10 SUSPENSION/ DROPS OPHTHALMIC at 01:05

## 2024-05-26 RX ADMIN — OXYCODONE 5 MG: 5 TABLET ORAL at 10:05

## 2024-05-26 RX ADMIN — GABAPENTIN 300 MG: 300 CAPSULE ORAL at 10:05

## 2024-05-26 RX ADMIN — PREDNISOLONE ACETATE 1 DROP: 10 SUSPENSION/ DROPS OPHTHALMIC at 09:05

## 2024-05-26 RX ADMIN — Medication 10 ML: at 11:05

## 2024-05-26 RX ADMIN — LORAZEPAM 1 MG: 0.5 TABLET ORAL at 09:05

## 2024-05-26 RX ADMIN — CEFAZOLIN 2 G: 2 INJECTION, POWDER, FOR SOLUTION INTRAMUSCULAR; INTRAVENOUS at 05:05

## 2024-05-26 RX ADMIN — VALACYCLOVIR HYDROCHLORIDE 1000 MG: 500 TABLET, FILM COATED ORAL at 10:05

## 2024-05-26 RX ADMIN — CARVEDILOL 25 MG: 25 TABLET, FILM COATED ORAL at 10:05

## 2024-05-26 RX ADMIN — Medication 10 ML: at 06:05

## 2024-05-26 RX ADMIN — GABAPENTIN 300 MG: 300 CAPSULE ORAL at 09:05

## 2024-05-26 RX ADMIN — VALACYCLOVIR HYDROCHLORIDE 1000 MG: 500 TABLET, FILM COATED ORAL at 06:05

## 2024-05-26 RX ADMIN — ERYTHROMYCIN: 5 OINTMENT OPHTHALMIC at 01:05

## 2024-05-26 RX ADMIN — POTASSIUM & SODIUM PHOSPHATES POWDER PACK 280-160-250 MG 2 PACKET: 280-160-250 PACK at 10:05

## 2024-05-26 RX ADMIN — SODIUM CHLORIDE 1000 ML: 9 INJECTION, SOLUTION INTRAVENOUS at 01:05

## 2024-05-26 RX ADMIN — CETIRIZINE HYDROCHLORIDE 5 MG: 5 TABLET, FILM COATED ORAL at 10:05

## 2024-05-26 RX ADMIN — MAGNESIUM SULFATE HEPTAHYDRATE 2 G: 40 INJECTION, SOLUTION INTRAVENOUS at 11:05

## 2024-05-26 RX ADMIN — CEFAZOLIN 2 G: 2 INJECTION, POWDER, FOR SOLUTION INTRAMUSCULAR; INTRAVENOUS at 10:05

## 2024-05-26 RX ADMIN — PREDNISOLONE ACETATE 1 DROP: 10 SUSPENSION/ DROPS OPHTHALMIC at 06:05

## 2024-05-26 NOTE — NURSING
"Pt refused AM labs,nurse educated pt on importance of lab work to help progress care. Pt stated " I don't want labs done."   "

## 2024-05-26 NOTE — PROGRESS NOTES
[facility] - Med Surg ([address] Medicine  Progress Note    Patient Name: Jessi Dial  MRN: [id number]  Patient Class: IP- Inpatient   Admission Date: 5/18/2024  Length of Stay: 8 days  Attending Physician: Hernando Amos MD  Primary Care Provider: Oscar Shafer MD        Subjective:     Principal Problem:Herpes zoster ophthalmicus of left eye        HPI:  Jessi Dial onesimo  67 yo W w/ PMH of asthma, COPD, seizures, and neurofibromatosis who presented to [facility] ED for persistent pain tot he left side of her face and was transferred to [facility] for inpatient ophthalmology evaluation. She reports that about 5 weeks ago she started having pain b/l below her breasts that later developed into an itchy and painful foul-smelling rash. This has been improving. She presented initially to the ED on 04/23 for L-sided headache and ear pain with N/V and was treated and discharged. She then presented to [facility] ED on 04/26 for a rash with an associated burning sensation that covered the L forehead and upper eyelid. The ED provider noted that she had no Pemberton sign and fluorescein staining of the L eye showed no dendritic lesions. She was discharged with valacyclovir and prednisolone eye drops but presented to the ED again yesterday as the pain had continued to persist despite the medications. She was followed outpatient by her PCP and an ophthalmologist. The vesicular lesions have begun to crust over but she is continuing to have significant pain over her L eye and struggles to keep it open. She reports that she was started on gabapentin outpatient for additional pain control and that it initially made her drowsy and confused but that those symptoms resolved as she continued taking it. Reports vision changes due to eye discharge and pain. Denies fever, chills, hearing changes, abdominal pain, shortness of breath, cough, chest pain.    In the OSH ED, she was hypertensive and tachycardic,  afebrile. Labs notable for leukocytosis to 13.11 and Na 134. CTH without evidence of acute intracranial abnormality but notable for numerous scalp neurofibromas. Received a dose of IV acyclovir. Transferred here for further evaluation and management.     Overview/Hospital Course:  Admitted to hospital medicine for herpes zoster ophthalmicus. Started on IV acyclovir. Ophthalmology consulted and added several eye drops. ID consulted with recs to transition to PO valtrex for 14 days upon discharge. Swelling and vision with mild improvement noted on 5/20/24 but significant pain still present. Became febrile and tachycardic with a new leukocytosis on 05/21, started on vanc + rocephin. CXR without an acute intrathoracic process. BCx growing GPCs and rapid ID showing MSSA, deescalated to ancef. CT maxillofacial showing mild paranasal sinus disease with aerated secretions in L sphenoid sinus and innumerable cutaneous nodules consistent with neurofibromatosis. ID re-consulted for new bacteremia. Repeat BCx NGTD. RUE US showing thrombophlebitis. TTE with EF 60-65% and without valvular vegetations. Zoster lesions visualized to be scabbed over and dry, isolation precautions discontinued. Midline placed for continued IV abx therapy.    Interval History: NAEO. Noted to be having worsening hypoxia. CXR ordered. Pain improved on higher dose of gabapentin.     Review of Systems   Eyes:  Positive for photophobia, pain, discharge and visual disturbance.   Respiratory:  Negative for cough and shortness of breath.    Cardiovascular:  Negative for chest pain.   Gastrointestinal:  Negative for abdominal pain, nausea and vomiting.   Skin:  Positive for rash.     Objective:     Vital Signs (Most Recent):  Temp: 98.5 °F (36.9 °C) (05/26/24 1108)  Pulse: 81 (05/26/24 1108)  Resp: 18 (05/26/24 1045)  BP: 127/76 (05/26/24 1108)  SpO2: (!) 87 % (05/26/24 1108) Vital Signs (24h Range):  Temp:  [97.7 °F (36.5 °C)-98.7 °F (37.1 °C)] 98.5 °F (36.9  °C)  Pulse:  [81-89] 81  Resp:  [16-18] 18  SpO2:  [87 %-92 %] 87 %  BP: ()/(57-76) 127/76     Weight: 70.3 kg (155 lb)  Body mass index is 29.29 kg/m².    Intake/Output Summary (Last 24 hours) at 5/26/2024 1414  Last data filed at 5/26/2024 0600  Gross per 24 hour   Intake 10 ml   Output --   Net 10 ml         Physical Exam  Vitals and nursing note reviewed.   Constitutional:       General: She is not in acute distress.  HENT:      Head: Normocephalic and atraumatic.      Comments:   Dry scabbed-over lesions along the L V1 dermatome    Erythema L eyelid stable  Diffuse neurofibromas present over the body surface     Mouth/Throat:      Mouth: Mucous membranes are moist.   Eyes:      Comments: Not opening eyes this morning   Cardiovascular:      Rate and Rhythm: Normal rate and regular rhythm.   Pulmonary:      Effort: Pulmonary effort is normal. No respiratory distress.   Abdominal:      General: Abdomen is flat. There is no distension.      Palpations: Abdomen is soft.      Tenderness: There is no abdominal tenderness.   Musculoskeletal:      Right lower leg: No edema.      Left lower leg: No edema.   Skin:     General: Skin is warm and dry.      Findings: Lesion and rash present.      Comments:   Scabbed vesicular lesions on left brow, forehead, and scalp  Diffuse neurofibromas     Neurological:      General: No focal deficit present.      Mental Status: She is alert and oriented to person, place, and time.   Psychiatric:         Mood and Affect: Mood normal.         Behavior: Behavior normal.             Significant Labs: All pertinent labs within the past 24 hours have been reviewed.    Significant Imaging: I have reviewed all pertinent imaging results/findings within the past 24 hours.    Assessment/Plan:      * Herpes zoster ophthalmicus of left eye  3-4 week history of vesicular lesions and pain extending along the L V1 dermatome including the L eyelid. She reports L eye discharge and pain that impacts  her vision. She was seen in the ED on 04/26 for this and followed outpatient by her PCP and ophthalmologist. Was taking Valtrex and using steroid eye drops, but the pain and rash persisted. Transferred here for inpatient ophthalmology evaluation. Vision impaired by discharge and presence of rash and neurofibromas on L eyelid, but otherwise no vision loss. Became febrile and tachycardic with new leukocytosis on 05/21.    - Ophthalmology consulted, recommended additional eye drop medications - prednisolone 4x/day, atropine, erythromycin, and artifical tears  - Benadryl and topical acyclovir for pruritis  - ID consulted with recs to continue IV acyclovir 10mg/kg q8h & transition to PO valtrex 1gm tid   - Gabapentin 300mg TID, Tylenol and oxy 5mg prn for pain  - F/u with ophthalmology in [place] within 2 weeks of discharge - discuss date and time with patient    Infection requiring contact isolation precautions  Pt tested positive for Herpes Zoster infection. Droplet-Contact precautions ordered. Treatment with IV Acyclovir commenced.   - Remove isolation precautions after all lesions are crusted over and remain dry    Needs peripherally inserted central catheter (PICC)  PICC required for continued abx therapy upon discharge. Midline placed 05/25.    MSSA bacteremia  On 05/21, noted to have tachycardia, fever, and new leukocytosis. CXR without acute intracranial process. BCx growing GPCs, rapid ID showing MSSA. CT maxillofacial showing mild paranasal sinus disease with aerated secretions in L sphenoid sinus and innumerable cutaneous nodules consistent with neurofibromatosis. RUE US notable for superficial thrombophlebitis of R basilic and cephalic veins. TTE (05/24/24) showing normal EF and no valvular vegetations.    - Continue ancef, midline placed for outpatient IV abx  - ID following, recommending 4 weeks of IV ancef following BCx clearance  - Repeat BCx with NGTD    Elevated BP without diagnosis of hypertension  -  Continue lisinopril to 20mg and coreg to 25mg BID  - Trend BP and labs    Anxiety  Continuing home ativan      Neurofibromatosis  Hx of neurofibromatosis      Asthma  Continue home inhaler        VTE Risk Mitigation (From admission, onward)           Ordered     enoxaparin injection 40 mg  Every 24 hours         05/23/24 1614     IP VTE LOW RISK PATIENT  Once         05/18/24 1505     Place sequential compression device  Until discontinued         05/18/24 1505                    Discharge Planning   AJ: 5/25/2024     Code Status: Full Code   Is the patient medically ready for discharge?:     Reason for patient still in hospital (select all that apply): Pending disposition  Discharge Plan A: Skilled Nursing Facility                  Duyen Joy MD  Department of Hospital Medicine   [facility] Surg ([employer]

## 2024-05-26 NOTE — ASSESSMENT & PLAN NOTE
3-4 week history of vesicular lesions and pain extending along the L V1 dermatome including the L eyelid. She reports L eye discharge and pain that impacts her vision. She was seen in the ED on 04/26 for this and followed outpatient by her PCP and ophthalmologist. Was taking Valtrex and using steroid eye drops, but the pain and rash persisted. Transferred here for inpatient ophthalmology evaluation. Vision impaired by discharge and presence of rash and neurofibromas on L eyelid, but otherwise no vision loss. Became febrile and tachycardic with new leukocytosis on 05/21.    - Ophthalmology consulted, recommended additional eye drop medications - prednisolone 4x/day, atropine, erythromycin, and artifical tears  - Benadryl and topical acyclovir for pruritis  - ID consulted with recs to continue IV acyclovir 10mg/kg q8h & transition to PO valtrex 1gm tid   - Gabapentin 300mg TID, Tylenol and oxy 5mg prn for pain  - F/u with ophthalmology in [place] within 2 weeks of discharge - discuss date and time with patient

## 2024-05-26 NOTE — CONSULTS
Per romaine Cisse for midline insertion.   Single lumen  4Fr x 8cm  midline placed in the right basilic vein. Needle advanced into the vessel under real time ultrasound guidance. Image recorded and saved.    Max dwell date 06/23/24.  Lot number: [id number]

## 2024-05-26 NOTE — ASSESSMENT & PLAN NOTE
On 05/21, noted to have tachycardia, fever, and new leukocytosis. CXR without acute intracranial process. BCx growing GPCs, rapid ID showing MSSA. CT maxillofacial showing mild paranasal sinus disease with aerated secretions in L sphenoid sinus and innumerable cutaneous nodules consistent with neurofibromatosis. RUE US notable for superficial thrombophlebitis of R basilic and cephalic veins. TTE (05/24/24) showing normal EF and no valvular vegetations.    - Continue ancef, midline placed for outpatient IV abx  - ID following, recommending 4 weeks of IV ancef following BCx clearance  - Repeat BCx with NGTD

## 2024-05-26 NOTE — PLAN OF CARE
Problem: Adult Inpatient Plan of Care  Goal: Plan of Care Review  Outcome: Not Progressing  Goal: Patient-Specific Goal (Individualized)  Outcome: Not Progressing  Goal: Absence of Hospital-Acquired Illness or Injury  Outcome: Not Progressing  Goal: Optimal Comfort and Wellbeing  Outcome: Not Progressing  Goal: Readiness for Transition of Care  Outcome: Not Progressing     Problem: Skin Injury Risk Increased  Goal: Skin Health and Integrity  Outcome: Not Progressing     Problem: Infection  Goal: Absence of Infection Signs and Symptoms  Outcome: Not Progressing     Problem: Pain Acute  Goal: Optimal Pain Control and Function  Outcome: Not Progressing

## 2024-05-26 NOTE — SUBJECTIVE & OBJECTIVE
Interval History: NAEO. Noted to be having worsening hypoxia. CXR ordered. Pain improved on higher dose of gabapentin.     Review of Systems   Eyes:  Positive for photophobia, pain, discharge and visual disturbance.   Respiratory:  Negative for cough and shortness of breath.    Cardiovascular:  Negative for chest pain.   Gastrointestinal:  Negative for abdominal pain, nausea and vomiting.   Skin:  Positive for rash.     Objective:     Vital Signs (Most Recent):  Temp: 98.5 °F (36.9 °C) (05/26/24 1108)  Pulse: 81 (05/26/24 1108)  Resp: 18 (05/26/24 1045)  BP: 127/76 (05/26/24 1108)  SpO2: (!) 87 % (05/26/24 1108) Vital Signs (24h Range):  Temp:  [97.7 °F (36.5 °C)-98.7 °F (37.1 °C)] 98.5 °F (36.9 °C)  Pulse:  [81-89] 81  Resp:  [16-18] 18  SpO2:  [87 %-92 %] 87 %  BP: ()/(57-76) 127/76     Weight: 70.3 kg (155 lb)  Body mass index is 29.29 kg/m².    Intake/Output Summary (Last 24 hours) at 5/26/2024 1414  Last data filed at 5/26/2024 0600  Gross per 24 hour   Intake 10 ml   Output --   Net 10 ml         Physical Exam  Vitals and nursing note reviewed.   Constitutional:       General: She is not in acute distress.  HENT:      Head: Normocephalic and atraumatic.      Comments:   Dry scabbed-over lesions along the L V1 dermatome    Erythema L eyelid stable  Diffuse neurofibromas present over the body surface     Mouth/Throat:      Mouth: Mucous membranes are moist.   Eyes:      Comments: Not opening eyes this morning   Cardiovascular:      Rate and Rhythm: Normal rate and regular rhythm.   Pulmonary:      Effort: Pulmonary effort is normal. No respiratory distress.   Abdominal:      General: Abdomen is flat. There is no distension.      Palpations: Abdomen is soft.      Tenderness: There is no abdominal tenderness.   Musculoskeletal:      Right lower leg: No edema.      Left lower leg: No edema.   Skin:     General: Skin is warm and dry.      Findings: Lesion and rash present.      Comments:   Scabbed vesicular  lesions on left brow, forehead, and scalp  Diffuse neurofibromas     Neurological:      General: No focal deficit present.      Mental Status: She is alert and oriented to person, place, and time.   Psychiatric:         Mood and Affect: Mood normal.         Behavior: Behavior normal.             Significant Labs: All pertinent labs within the past 24 hours have been reviewed.    Significant Imaging: I have reviewed all pertinent imaging results/findings within the past 24 hours.

## 2024-05-27 LAB
ALBUMIN SERPL BCP-MCNC: 2.4 G/DL (ref 3.5–5.2)
ALP SERPL-CCNC: 133 U/L (ref 55–135)
ALT SERPL W/O P-5'-P-CCNC: 5 U/L (ref 10–44)
ANION GAP SERPL CALC-SCNC: 8 MMOL/L (ref 8–16)
ANISOCYTOSIS BLD QL SMEAR: SLIGHT
AST SERPL-CCNC: 26 U/L (ref 10–40)
BASOPHILS NFR BLD: 1 % (ref 0–1.9)
BILIRUB SERPL-MCNC: 0.3 MG/DL (ref 0.1–1)
BUN SERPL-MCNC: 10 MG/DL (ref 8–23)
CALCIUM SERPL-MCNC: 8.6 MG/DL (ref 8.7–10.5)
CHLORIDE SERPL-SCNC: 96 MMOL/L (ref 95–110)
CO2 SERPL-SCNC: 29 MMOL/L (ref 23–29)
CREAT SERPL-MCNC: 0.7 MG/DL (ref 0.5–1.4)
DIFFERENTIAL METHOD BLD: ABNORMAL
EOSINOPHIL NFR BLD: 1 % (ref 0–8)
ERYTHROCYTE [DISTWIDTH] IN BLOOD BY AUTOMATED COUNT: 14.4 % (ref 11.5–14.5)
EST. GFR  (NO RACE VARIABLE): >60 ML/MIN/1.73 M^2
GLUCOSE SERPL-MCNC: 88 MG/DL (ref 70–110)
HCT VFR BLD AUTO: 33.8 % (ref 37–48.5)
HGB BLD-MCNC: 10.6 G/DL (ref 12–16)
IMM GRANULOCYTES # BLD AUTO: ABNORMAL K/UL (ref 0–0.04)
IMM GRANULOCYTES NFR BLD AUTO: ABNORMAL % (ref 0–0.5)
LYMPHOCYTES NFR BLD: 16 % (ref 18–48)
MAGNESIUM SERPL-MCNC: 1.7 MG/DL (ref 1.6–2.6)
MCH RBC QN AUTO: 28.8 PG (ref 27–31)
MCHC RBC AUTO-ENTMCNC: 31.4 G/DL (ref 32–36)
MCV RBC AUTO: 92 FL (ref 82–98)
METAMYELOCYTES NFR BLD MANUAL: 1 %
MONOCYTES NFR BLD: 9 % (ref 4–15)
MYELOCYTES NFR BLD MANUAL: 2 %
NEUTROPHILS NFR BLD: 67 % (ref 38–73)
NEUTS BAND NFR BLD MANUAL: 3 %
NRBC BLD-RTO: 0 /100 WBC
PHOSPHATE SERPL-MCNC: 3.2 MG/DL (ref 2.7–4.5)
PLATELET # BLD AUTO: 317 K/UL (ref 150–450)
PMV BLD AUTO: 10.7 FL (ref 9.2–12.9)
POLYCHROMASIA BLD QL SMEAR: ABNORMAL
POTASSIUM SERPL-SCNC: 3.8 MMOL/L (ref 3.5–5.1)
PROT SERPL-MCNC: 5.7 G/DL (ref 6–8.4)
RBC # BLD AUTO: 3.68 M/UL (ref 4–5.4)
SODIUM SERPL-SCNC: 133 MMOL/L (ref 136–145)
WBC # BLD AUTO: 8.49 K/UL (ref 3.9–12.7)

## 2024-05-27 PROCEDURE — A4216 STERILE WATER/SALINE, 10 ML: HCPCS | Performed by: STUDENT IN AN ORGANIZED HEALTH CARE EDUCATION/TRAINING PROGRAM

## 2024-05-27 PROCEDURE — 25000003 PHARM REV CODE 250: Performed by: STUDENT IN AN ORGANIZED HEALTH CARE EDUCATION/TRAINING PROGRAM

## 2024-05-27 PROCEDURE — 63600175 PHARM REV CODE 636 W HCPCS: Performed by: STUDENT IN AN ORGANIZED HEALTH CARE EDUCATION/TRAINING PROGRAM

## 2024-05-27 PROCEDURE — 63600175 PHARM REV CODE 636 W HCPCS: Performed by: HOSPITALIST

## 2024-05-27 PROCEDURE — 97530 THERAPEUTIC ACTIVITIES: CPT

## 2024-05-27 PROCEDURE — 83735 ASSAY OF MAGNESIUM: CPT | Performed by: STUDENT IN AN ORGANIZED HEALTH CARE EDUCATION/TRAINING PROGRAM

## 2024-05-27 PROCEDURE — 25000003 PHARM REV CODE 250

## 2024-05-27 PROCEDURE — 84100 ASSAY OF PHOSPHORUS: CPT | Performed by: STUDENT IN AN ORGANIZED HEALTH CARE EDUCATION/TRAINING PROGRAM

## 2024-05-27 PROCEDURE — 94640 AIRWAY INHALATION TREATMENT: CPT

## 2024-05-27 PROCEDURE — 11000001 HC ACUTE MED/SURG PRIVATE ROOM

## 2024-05-27 PROCEDURE — 85027 COMPLETE CBC AUTOMATED: CPT | Performed by: STUDENT IN AN ORGANIZED HEALTH CARE EDUCATION/TRAINING PROGRAM

## 2024-05-27 PROCEDURE — 25000003 PHARM REV CODE 250: Performed by: HOSPITALIST

## 2024-05-27 PROCEDURE — 85007 BL SMEAR W/DIFF WBC COUNT: CPT | Performed by: STUDENT IN AN ORGANIZED HEALTH CARE EDUCATION/TRAINING PROGRAM

## 2024-05-27 PROCEDURE — 25000003 PHARM REV CODE 250: Performed by: INTERNAL MEDICINE

## 2024-05-27 PROCEDURE — 36415 COLL VENOUS BLD VENIPUNCTURE: CPT | Performed by: STUDENT IN AN ORGANIZED HEALTH CARE EDUCATION/TRAINING PROGRAM

## 2024-05-27 PROCEDURE — 80053 COMPREHEN METABOLIC PANEL: CPT | Performed by: STUDENT IN AN ORGANIZED HEALTH CARE EDUCATION/TRAINING PROGRAM

## 2024-05-27 PROCEDURE — 94761 N-INVAS EAR/PLS OXIMETRY MLT: CPT

## 2024-05-27 RX ORDER — GABAPENTIN 300 MG/1
300 CAPSULE ORAL 3 TIMES DAILY
Qty: 90 CAPSULE | Refills: 11 | Status: SHIPPED | OUTPATIENT
Start: 2024-05-27 | End: 2024-05-28

## 2024-05-27 RX ADMIN — FLUTICASONE PROPIONATE 100 MCG: 50 SPRAY, METERED NASAL at 10:05

## 2024-05-27 RX ADMIN — VALACYCLOVIR HYDROCHLORIDE 1000 MG: 500 TABLET, FILM COATED ORAL at 10:05

## 2024-05-27 RX ADMIN — CARVEDILOL 25 MG: 25 TABLET, FILM COATED ORAL at 09:05

## 2024-05-27 RX ADMIN — GABAPENTIN 300 MG: 300 CAPSULE ORAL at 09:05

## 2024-05-27 RX ADMIN — Medication 10 ML: at 06:05

## 2024-05-27 RX ADMIN — FLUTICASONE FUROATE AND VILANTEROL TRIFENATATE 1 PUFF: 100; 25 POWDER RESPIRATORY (INHALATION) at 12:05

## 2024-05-27 RX ADMIN — CEFAZOLIN 2 G: 2 INJECTION, POWDER, FOR SOLUTION INTRAMUSCULAR; INTRAVENOUS at 04:05

## 2024-05-27 RX ADMIN — GABAPENTIN 300 MG: 300 CAPSULE ORAL at 04:05

## 2024-05-27 RX ADMIN — CARBOXYMETHYLCELLULOSE SODIUM 1 DROP: 10 GEL OPHTHALMIC at 04:05

## 2024-05-27 RX ADMIN — GABAPENTIN 300 MG: 300 CAPSULE ORAL at 10:05

## 2024-05-27 RX ADMIN — Medication 10 ML: at 12:05

## 2024-05-27 RX ADMIN — PREDNISOLONE ACETATE 1 DROP: 10 SUSPENSION/ DROPS OPHTHALMIC at 06:05

## 2024-05-27 RX ADMIN — CARBOXYMETHYLCELLULOSE SODIUM 1 DROP: 10 GEL OPHTHALMIC at 06:05

## 2024-05-27 RX ADMIN — ERYTHROMYCIN: 5 OINTMENT OPHTHALMIC at 10:05

## 2024-05-27 RX ADMIN — Medication 10 ML: at 04:05

## 2024-05-27 RX ADMIN — VALACYCLOVIR HYDROCHLORIDE 1000 MG: 500 TABLET, FILM COATED ORAL at 09:05

## 2024-05-27 RX ADMIN — CARBOXYMETHYLCELLULOSE SODIUM 1 DROP: 10 GEL OPHTHALMIC at 10:05

## 2024-05-27 RX ADMIN — CEFAZOLIN 2 G: 2 INJECTION, POWDER, FOR SOLUTION INTRAMUSCULAR; INTRAVENOUS at 12:05

## 2024-05-27 RX ADMIN — CEFAZOLIN 2 G: 2 INJECTION, POWDER, FOR SOLUTION INTRAMUSCULAR; INTRAVENOUS at 10:05

## 2024-05-27 RX ADMIN — ERYTHROMYCIN: 5 OINTMENT OPHTHALMIC at 04:05

## 2024-05-27 RX ADMIN — CARBOXYMETHYLCELLULOSE SODIUM 1 DROP: 10 GEL OPHTHALMIC at 09:05

## 2024-05-27 RX ADMIN — PREDNISOLONE ACETATE 1 DROP: 10 SUSPENSION/ DROPS OPHTHALMIC at 09:05

## 2024-05-27 RX ADMIN — CETIRIZINE HYDROCHLORIDE 5 MG: 5 TABLET, FILM COATED ORAL at 10:05

## 2024-05-27 RX ADMIN — ENOXAPARIN SODIUM 40 MG: 40 INJECTION SUBCUTANEOUS at 04:05

## 2024-05-27 RX ADMIN — ERYTHROMYCIN: 5 OINTMENT OPHTHALMIC at 09:05

## 2024-05-27 RX ADMIN — PREDNISOLONE ACETATE 1 DROP: 10 SUSPENSION/ DROPS OPHTHALMIC at 04:05

## 2024-05-27 RX ADMIN — CARVEDILOL 25 MG: 25 TABLET, FILM COATED ORAL at 10:05

## 2024-05-27 RX ADMIN — OXYCODONE 5 MG: 5 TABLET ORAL at 10:05

## 2024-05-27 RX ADMIN — PREDNISOLONE ACETATE 1 DROP: 10 SUSPENSION/ DROPS OPHTHALMIC at 10:05

## 2024-05-27 RX ADMIN — VALACYCLOVIR HYDROCHLORIDE 1000 MG: 500 TABLET, FILM COATED ORAL at 04:05

## 2024-05-27 RX ADMIN — ERYTHROMYCIN: 5 OINTMENT OPHTHALMIC at 06:05

## 2024-05-27 RX ADMIN — LISINOPRIL 20 MG: 20 TABLET ORAL at 10:05

## 2024-05-27 RX ADMIN — ATROPINE SULFATE 1 DROP: 10 SOLUTION/ DROPS OPHTHALMIC at 10:05

## 2024-05-27 RX ADMIN — OXYCODONE 5 MG: 5 TABLET ORAL at 02:05

## 2024-05-27 NOTE — NURSING
"Family at bedside requested midnight vitals to be skipped. Stated "Pt has just fallen asleep". Visualized pt visibly breathing and resting with no signs of distress.   "

## 2024-05-27 NOTE — PLAN OF CARE
Discharge Plan A and Plan B have been determined by review of patient's clinical status, future medical and therapeutic needs, and coverage/benefits for post-acute care in coordination with multidisciplinary team members.    05/27/24 1220   Post-Acute Status   Post-Acute Authorization Placement   Post-Acute Placement Status Pending post-acute provider review/more information requested   Coverage [employer] MANAGED MEDICARE - [employer] MED[employer]ARE HMO -   Discharge Plan   Discharge Plan A Skilled Nursing Facility   Discharge Plan B Home Health;Home with family     STEPHANIE received phone call from Monica ([facility]). Monica states that facility is able to accept pending review of additional information. Monica also states that she reviewed acceptance w/ family and family is agreeable to facility and its location. Monica is requesting labs, imaging, pt/ot evals. SW sent to Monica via care port. Per Monica, will submit for SNF auth once all documents are reviewed by medical team. Per Monica, will submit for the auth today but does not anticipate getting response from [employer] today due to Memorial Day Holiday. Anticipates insurance auth response Tuesday and can plan to admit the patient Tues.    STEPHANIE will continue to follow.                        MARCELO Munoz, LMSW  [facility]  Case Management  Ext. [phone]

## 2024-05-27 NOTE — ASSESSMENT & PLAN NOTE
3-4 week history of vesicular lesions and pain extending along the L V1 dermatome including the L eyelid. She reports L eye discharge and pain that impacts her vision. She was seen in the ED on 04/26 for this and followed outpatient by her PCP and ophthalmologist. Was taking Valtrex and using steroid eye drops, but the pain and rash persisted. Transferred here for inpatient ophthalmology evaluation. Vision impaired by discharge and presence of rash and neurofibromas on L eyelid, but otherwise no vision loss. Became febrile and tachycardic with new leukocytosis on 05/21.    - Ophthalmology consulted, recommended additional eye drop medications - prednisolone 4x/day, atropine, erythromycin, and artifical tears  - Benadryl and topical acyclovir for pruritis  - ID consulted with recs to continue IV acyclovir 10mg/kg q8h & transition to PO valtrex 1gm tid   - Gabapentin 300mg TID, Tylenol and oxy 5mg prn for pain  - F/u with ophthalmology in [place] within 2 weeks of discharge - discuss date and time with patient

## 2024-05-27 NOTE — PLAN OF CARE
Problem: Adult Inpatient Plan of Care  Goal: Plan of Care Review  Outcome: Progressing  Goal: Patient-Specific Goal (Individualized)  Outcome: Progressing  Goal: Absence of Hospital-Acquired Illness or Injury  Outcome: Progressing  Goal: Optimal Comfort and Wellbeing  Outcome: Progressing  Goal: Readiness for Transition of Care  Outcome: Progressing     Problem: Skin Injury Risk Increased  Goal: Skin Health and Integrity  Outcome: Progressing     Problem: Infection  Goal: Absence of Infection Signs and Symptoms  Outcome: Progressing     Problem: Pain Acute  Goal: Optimal Pain Control and Function  Outcome: Progressing

## 2024-05-27 NOTE — PLAN OF CARE
SWING BED UNIT ORDERS    05/27/2024  [facility]  [facility] - MED SURG ([facility])  [address]  [facility] [address]  Dept: [phone]  Loc: [phone]     Admit to Swing Bed Unit:      Diagnoses:  Active Hospital Problems    Diagnosis  POA    *Herpes zoster ophthalmicus of left eye [B02.30]  Yes    Needs peripherally inserted central catheter (PICC) [Z45.2]  Not Applicable    Infection requiring contact isolation precautions [B99.9]  Yes    MSSA bacteremia [R78.81, B95.61]  No    Elevated BP without diagnosis of hypertension [R03.0]  Yes    Anxiety [F41.9]  Yes    Neurofibromatosis [Q85.00]  Yes     Chronic    Asthma [J45.909]  Yes     Chronic      Resolved Hospital Problems   No resolved problems to display.       Patient is homebound due to:  Herpes zoster ophthalmicus of left eye    Allergies:  Review of patient's allergies indicates:   Allergen Reactions    Avelox [moxifloxacin] Swelling       Vitals:  Routine    Diet: regular diet    Activities:   Activity as tolerated    Goals of Care Treatment Preferences:  Code Status: Full Code      Labs:  CBC and BMP weekly    Nursing Precautions:  Fall    Consults:   PT to evaluate and treat- 3 times a week and OT to evaluate and treat- 3 times a week     Miscellaneous Care: PICC Line Care: PICC Line Use/Care Instructions:  Scrub the Hub: Prior to accessing the line, always perform a 30 second alcohol scrub  Each lumen of the central line is to be flushed at least daily with 10 mL Normal Saline and 3 mL Heparin flush (10 units/mL)  Skilled Nurse (SN) may draw blood from IV access  Blood Draw Procedure:  - Aspirate at least 5 mL of blood  - Discard  - Obtain specimen  - Change injection cap  - Flush with 20 mL Normal Saline followed by a 3-5 mL Heparin flush (10 units/mL)    Central :  - Sterile dressing changes are done weekly and as needed.  - Use chlor-hexadine scrub to cleanse site, apply Biopatch to insertion  site, apply securement device dressing  - Injection caps are changed weekly and after EVERY lab draw.  - If sterile gauze is under dressing to control oozing, dressing change must be performed every 24 hours until gauze is not needed.                   Diabetes Care:  n/a      Medications: Discontinue all previous medication orders, if any. See new list below.     Medication List        START taking these medications      atropine 1% 1 % Drop  Commonly known as: ISOPTO ATROPINE  Place 1 drop into the left eye once daily.     carboxymethylcellulose sodium 1 % Dpge  Apply 1 drop to eye 4 (four) times daily.     carvediloL 25 MG tablet  Commonly known as: COREG  Take 1 tablet (25 mg total) by mouth 2 (two) times daily.     dextrose 5 % in water (D5W) PgBk 50 mL with ceFAZolin 2 gram SolR 2 g  Inject 2 g into the vein every 8 (eight) hours. for 23 days     diphenhydrAMINE 25 mg capsule  Commonly known as: BENADRYL  Take 1 capsule (25 mg total) by mouth every 4 (four) hours as needed for Itching.     ergocalciferol 50,000 unit Cap  Commonly known as: ERGOCALCIFEROL  Take 1 capsule (50,000 Units total) by mouth every 7 days.  Start taking on: June 2, 2024     erythromycin ophthalmic ointment  Commonly known as: ROMYCIN  Place into the left eye 4 (four) times daily.     valACYclovir 1000 MG tablet  Commonly known as: VALTREX  Take 1 tablet (1,000 mg total) by mouth 3 (three) times daily.            CHANGE how you take these medications      gabapentin 300 MG capsule  Commonly known as: NEURONTIN  Take 1 capsule (300 mg total) by mouth 3 (three) times daily.  What changed:   medication strength  how much to take            CONTINUE taking these medications      acetaminophen 325 MG tablet  Commonly known as: TYLENOL  Take 2 tablets (650 mg total) by mouth every 4 (four) hours as needed.     budesonide-formoterol 80-4.5 mcg 80-4.5 mcg/actuation Hfaa  Commonly known as: SYMBICORT  Inhale 2 puffs into the lungs 2 (two) times  daily. Controller     fluticasone propionate 50 mcg/actuation nasal spray  Commonly known as: FLONASE  1 spray (50 mcg total) by Each Nostril route 2 (two) times daily as needed for Rhinitis.     levocetirizine 5 MG tablet  Commonly known as: XYZAL  Take 1 tablet (5 mg total) by mouth every evening.     LORazepam 1 MG tablet  Commonly known as: ATIVAN  Take 1 mg by mouth nightly as needed for Anxiety.     losartan 50 MG tablet  Commonly known as: COZAAR  Take 50 mg by mouth once daily.     prednisoLONE acetate 1 % Drps  Commonly known as: PRED FORTE  Place 1 drop into the left eye 4 (four) times daily.                Immunizations Administered as of 5/27/2024       Name Date Dose VIS Date Route Exp Date    COVID-19, MRNA, LN-S, PF (Moderna) 10/22/2021 0.5 mL -- -- --    Lot: [id number]     External: Auto Reconciled From Outside Source     COVID-19, MRNA, LN-S, PF (Moderna) 9/24/2021 0.5 mL 8/27/2021 Intramuscular --    Site: Left arm     : [employer]     Lot: [id number]     External: Auto Reconciled From Outside Source     Comment: Adminis             This patient has had both covid vaccinations    Some patients may experience side effects after vaccination.  These may include fever, headache, muscle or joint aches.  Most symptoms resolve with 24-48 hours and do not require urgent medical evaluation unless they persist for more than 72 hours or symptoms are concerning for an unrelated medical condition.          _________________________________  Duyen Joy MD  05/27/2024

## 2024-05-27 NOTE — ASSESSMENT & PLAN NOTE
Pt tested positive for Herpes Zoster infection. Droplet-Contact precautions ordered. Treatment with IV Acyclovir commenced.   - Removed isolation precautions after all lesions crusted over and remained dry

## 2024-05-27 NOTE — SUBJECTIVE & OBJECTIVE
Interval History: NAEO. Still hypoxic on RA, discussed with her the importance of using the incentive spirometer. Portable pulse oximetry reading 95% on rounds. Pain improved on current dose of gabapentin, however it is causing drowsiness.     Review of Systems   Constitutional:  Positive for fatigue.   Eyes:  Positive for photophobia, pain, discharge and visual disturbance.   Respiratory:  Negative for cough and shortness of breath.    Cardiovascular:  Negative for chest pain.   Gastrointestinal:  Negative for abdominal pain, nausea and vomiting.   Skin:  Positive for rash.     Objective:     Vital Signs (Most Recent):  Temp: 98.2 °F (36.8 °C) (05/27/24 1201)  Pulse: 80 (05/27/24 1217)  Resp: 18 (05/27/24 1217)  BP: 124/73 (05/27/24 1201)  SpO2: 98 % (05/27/24 1217) Vital Signs (24h Range):  Temp:  [98.2 °F (36.8 °C)-99.2 °F (37.3 °C)] 98.2 °F (36.8 °C)  Pulse:  [72-89] 80  Resp:  [16-18] 18  SpO2:  [91 %-98 %] 98 %  BP: (124-136)/(70-80) 124/73     Weight: 70.3 kg (155 lb)  Body mass index is 29.29 kg/m².    Intake/Output Summary (Last 24 hours) at 5/27/2024 1357  Last data filed at 5/27/2024 1026  Gross per 24 hour   Intake 240 ml   Output --   Net 240 ml         Physical Exam  Vitals and nursing note reviewed.   Constitutional:       General: She is not in acute distress.  HENT:      Head: Normocephalic and atraumatic.      Comments:   Dry scabbed-over lesions along the L V1 dermatome    Erythema L eyelid stable  Diffuse neurofibromas present over the body surface     Mouth/Throat:      Mouth: Mucous membranes are moist.   Eyes:      Comments: Eyes more open this morning   Cardiovascular:      Rate and Rhythm: Normal rate and regular rhythm.   Pulmonary:      Effort: Pulmonary effort is normal. No respiratory distress.   Musculoskeletal:      Right lower leg: No edema.      Left lower leg: No edema.   Skin:     General: Skin is warm and dry.      Findings: Lesion and rash present.      Comments:   Scabbed  vesicular lesions on left brow, forehead, and scalp  Diffuse neurofibromas   Neurological:      General: No focal deficit present.      Mental Status: She is alert and oriented to person, place, and time.   Psychiatric:         Mood and Affect: Mood normal.         Behavior: Behavior normal.             Significant Labs: All pertinent labs within the past 24 hours have been reviewed.    Significant Imaging: I have reviewed all pertinent imaging results/findings within the past 24 hours.

## 2024-05-27 NOTE — ASSESSMENT & PLAN NOTE
On 05/21, noted to have tachycardia, fever, and new leukocytosis. CXR without acute intracranial process. BCx growing GPCs, rapid ID showing MSSA. CT maxillofacial showing mild paranasal sinus disease with aerated secretions in L sphenoid sinus and innumerable cutaneous nodules consistent with neurofibromatosis. RUE US notable for superficial thrombophlebitis of R basilic and cephalic veins. TTE (05/24/24) showing normal EF and no valvular vegetations.    - Continue ancef, midline placed for outpatient IV abx  - ID following, recommending 4 weeks of IV ancef following BCx clearance (05/23/24)  - Repeat BCx with NGTD

## 2024-05-27 NOTE — PT/OT/SLP PROGRESS
Occupational Therapy   Treatment    Name: Jessi Dial  MRN: [id number]  Admitting Diagnosis:  Herpes zoster ophthalmicus of left eye       Recommendations:     Discharge Recommendations: Low Intensity Therapy  Discharge Equipment Recommendations:  none  Barriers to discharge:  None    Assessment:     Jessi Dial is a 68 y.o. female with a medical diagnosis of Herpes zoster ophthalmicus of left eye.  She presents with continued improving activity tolerance but still very limited by pain. Maximal encouragement required for participation. Patient endorsing dizziness that has been present since hospital admission. Performance deficits affecting function are weakness, impaired endurance, impaired self care skills, impaired functional mobility, gait instability, decreased upper extremity function, decreased lower extremity function, decreased safety awareness, impaired skin, visual deficits.     Rehab Prognosis:  Good; patient would benefit from acute skilled OT services to address these deficits and reach maximum level of function.       Plan:     Patient to be seen 3 x/week to address the above listed problems via self-care/home management, therapeutic activities, therapeutic exercises, neuromuscular re-education  Plan of Care Expires: 06/28/24  Plan of Care Reviewed with: patient, sibling    Subjective     Chief Complaint: pain   Patient/Family Comments/goals: patient's sister with concerns over her level of functioning  Pain/Comfort:  Pain Rating 1: 8/10  Location 1:  (head and L eye)    Objective:     Communicated with: nursing prior to session.  Patient found HOB elevated with peripheral IV upon OT entry to room. Patient's sister present in room during session.     General Precautions: Standard, fall    Orthopedic Precautions:N/A  Braces: N/A  Respiratory Status: Room air     Occupational Performance:     Bed Mobility:    Patient completed Supine to Sit with supervision     Functional Mobility/Transfers:  Patient  completed Sit <> Stand Transfer with stand by assistance  with  rolling walker x2 reps  Functional Mobility: Patient ambulated ~10 ft then ~12 ft with RW and SBA; increased cueing required secondary to visual deficits (pain opening eyes)    Activities of Daily Living:  Grooming: stand by assistance to wash face in stance at sink  Upper Body Dressing: stand by assistance to don gown like jacket      [facility] 6 Click ADL: 21    Treatment & Education:    Patient educated on:   -purpose of OT and OT POC  -facilitation and education on proper body mechanics, energy conservation, and safety  -importance of early mobility and out of bed activities with staff assist  -overall benefits of therapy     All questions answered within OT scope and to patient's satisfaction    Patient left up in chair with all lines intact, call button in reach, nursing notified, and sister present    GOALS:   Multidisciplinary Problems       Occupational Therapy Goals          Problem: Occupational Therapy    Goal Priority Disciplines Outcome Interventions   Occupational Therapy Goal     OT, PT/OT Progressing    Description: Goals to be met by: 6/22/25     Patient will increase functional independence with ADLs by performing:    UE Dressing with Modified [place].  LE Dressing with Modified [place].  Grooming while standing at sink with Modified [place].  Toileting from toilet with Modified [place] for hygiene and clothing management.   Step transfer with Modified [place]  Toilet transfer to toilet with Modified [place].                         Time Tracking:     OT Date of Treatment: 05/27/24  OT Start Time: 1045  OT Stop Time: 1107  OT Total Time (min): 22 min    Billable Minutes:Therapeutic Activity 22    OT/CONNIE: OT          5/27/2024

## 2024-05-27 NOTE — PROGRESS NOTES
[facility] - Med Surg ([address] Medicine  Progress Note    Patient Name: Jessi Dial  MRN: [id number]  Patient Class: IP- Inpatient   Admission Date: 5/18/2024  Length of Stay: 9 days  Attending Physician: Hernando Amos MD  Primary Care Provider: Oscar Shafer MD        Subjective:     Principal Problem:Herpes zoster ophthalmicus of left eye        HPI:  Jessi Dial onesimo  69 yo W w/ PMH of asthma, COPD, seizures, and neurofibromatosis who presented to [facility] ED for persistent pain tot he left side of her face and was transferred to [facility] for inpatient ophthalmology evaluation. She reports that about 5 weeks ago she started having pain b/l below her breasts that later developed into an itchy and painful foul-smelling rash. This has been improving. She presented initially to the ED on 04/23 for L-sided headache and ear pain with N/V and was treated and discharged. She then presented to [facility] ED on 04/26 for a rash with an associated burning sensation that covered the L forehead and upper eyelid. The ED provider noted that she had no Pemberton sign and fluorescein staining of the L eye showed no dendritic lesions. She was discharged with valacyclovir and prednisolone eye drops but presented to the ED again yesterday as the pain had continued to persist despite the medications. She was followed outpatient by her PCP and an ophthalmologist. The vesicular lesions have begun to crust over but she is continuing to have significant pain over her L eye and struggles to keep it open. She reports that she was started on gabapentin outpatient for additional pain control and that it initially made her drowsy and confused but that those symptoms resolved as she continued taking it. Reports vision changes due to eye discharge and pain. Denies fever, chills, hearing changes, abdominal pain, shortness of breath, cough, chest pain.    In the OSH ED, she was hypertensive and tachycardic,  afebrile. Labs notable for leukocytosis to 13.11 and Na 134. CTH without evidence of acute intracranial abnormality but notable for numerous scalp neurofibromas. Received a dose of IV acyclovir. Transferred here for further evaluation and management.     Overview/Hospital Course:  Admitted to hospital medicine for herpes zoster ophthalmicus. Started on IV acyclovir. Ophthalmology consulted and added several eye drops. ID consulted with recs to transition to PO valtrex for 14 days upon discharge. Swelling and vision with mild improvement noted on 5/20/24 but significant pain still present. Became febrile and tachycardic with a new leukocytosis on 05/21, started on vanc + rocephin. CXR without an acute intrathoracic process. BCx growing GPCs and rapid ID showing MSSA, deescalated to ancef. CT maxillofacial showing mild paranasal sinus disease with aerated secretions in L sphenoid sinus and innumerable cutaneous nodules consistent with neurofibromatosis. ID re-consulted for new bacteremia. Repeat BCx NGTD. RUE US showing thrombophlebitis. TTE with EF 60-65% and without valvular vegetations. Zoster lesions visualized to be scabbed over and dry, isolation precautions discontinued. Midline placed for continued IV abx therapy. Pending discharge to SNF.    Interval History: NAEO. Still hypoxic on RA, discussed with her the importance of using the incentive spirometer. Portable pulse oximetry reading 95% on rounds. Pain improved on current dose of gabapentin, however it is causing drowsiness.     Review of Systems   Constitutional:  Positive for fatigue.   Eyes:  Positive for photophobia, pain, discharge and visual disturbance.   Respiratory:  Negative for cough and shortness of breath.    Cardiovascular:  Negative for chest pain.   Gastrointestinal:  Negative for abdominal pain, nausea and vomiting.   Skin:  Positive for rash.     Objective:     Vital Signs (Most Recent):  Temp: 98.2 °F (36.8 °C) (05/27/24 1201)  Pulse: 80  (05/27/24 1217)  Resp: 18 (05/27/24 1217)  BP: 124/73 (05/27/24 1201)  SpO2: 98 % (05/27/24 1217) Vital Signs (24h Range):  Temp:  [98.2 °F (36.8 °C)-99.2 °F (37.3 °C)] 98.2 °F (36.8 °C)  Pulse:  [72-89] 80  Resp:  [16-18] 18  SpO2:  [91 %-98 %] 98 %  BP: (124-136)/(70-80) 124/73     Weight: 70.3 kg (155 lb)  Body mass index is 29.29 kg/m².    Intake/Output Summary (Last 24 hours) at 5/27/2024 1357  Last data filed at 5/27/2024 1026  Gross per 24 hour   Intake 240 ml   Output --   Net 240 ml         Physical Exam  Vitals and nursing note reviewed.   Constitutional:       General: She is not in acute distress.  HENT:      Head: Normocephalic and atraumatic.      Comments:   Dry scabbed-over lesions along the L V1 dermatome    Erythema L eyelid stable  Diffuse neurofibromas present over the body surface     Mouth/Throat:      Mouth: Mucous membranes are moist.   Eyes:      Comments: Eyes more open this morning   Cardiovascular:      Rate and Rhythm: Normal rate and regular rhythm.   Pulmonary:      Effort: Pulmonary effort is normal. No respiratory distress.   Musculoskeletal:      Right lower leg: No edema.      Left lower leg: No edema.   Skin:     General: Skin is warm and dry.      Findings: Lesion and rash present.      Comments:   Scabbed vesicular lesions on left brow, forehead, and scalp  Diffuse neurofibromas   Neurological:      General: No focal deficit present.      Mental Status: She is alert and oriented to person, place, and time.   Psychiatric:         Mood and Affect: Mood normal.         Behavior: Behavior normal.             Significant Labs: All pertinent labs within the past 24 hours have been reviewed.    Significant Imaging: I have reviewed all pertinent imaging results/findings within the past 24 hours.    Assessment/Plan:      * Herpes zoster ophthalmicus of left eye  3-4 week history of vesicular lesions and pain extending along the L V1 dermatome including the L eyelid. She reports L eye  discharge and pain that impacts her vision. She was seen in the ED on 04/26 for this and followed outpatient by her PCP and ophthalmologist. Was taking Valtrex and using steroid eye drops, but the pain and rash persisted. Transferred here for inpatient ophthalmology evaluation. Vision impaired by discharge and presence of rash and neurofibromas on L eyelid, but otherwise no vision loss. Became febrile and tachycardic with new leukocytosis on 05/21.    - Ophthalmology consulted, recommended additional eye drop medications - prednisolone 4x/day, atropine, erythromycin, and artifical tears  - Benadryl and topical acyclovir for pruritis  - ID consulted with recs to continue IV acyclovir 10mg/kg q8h & transition to PO valtrex 1gm tid   - Gabapentin 300mg TID, Tylenol and oxy 5mg prn for pain  - F/u with ophthalmology in [place] within 2 weeks of discharge - discuss date and time with patient    Infection requiring contact isolation precautions  Pt tested positive for Herpes Zoster infection. Droplet-Contact precautions ordered. Treatment with IV Acyclovir commenced.   - Removed isolation precautions after all lesions crusted over and remained dry    Needs peripherally inserted central catheter (PICC)  PICC required for continued abx therapy upon discharge. Midline placed 05/25.    MSSA bacteremia  On 05/21, noted to have tachycardia, fever, and new leukocytosis. CXR without acute intracranial process. BCx growing GPCs, rapid ID showing MSSA. CT maxillofacial showing mild paranasal sinus disease with aerated secretions in L sphenoid sinus and innumerable cutaneous nodules consistent with neurofibromatosis. RUE US notable for superficial thrombophlebitis of R basilic and cephalic veins. TTE (05/24/24) showing normal EF and no valvular vegetations.    - Continue ancef, midline placed for outpatient IV abx  - ID following, recommending 4 weeks of IV ancef following BCx clearance (05/23/24)  - Repeat BCx with  NGTD    Elevated BP without diagnosis of hypertension  - Continue lisinopril to 20mg and coreg to 25mg BID  - Trend BP and labs    Anxiety  Continuing home ativan      Neurofibromatosis  Hx of neurofibromatosis      Asthma  Continue home inhaler        VTE Risk Mitigation (From admission, onward)           Ordered     enoxaparin injection 40 mg  Every 24 hours         05/23/24 1614     IP VTE LOW RISK PATIENT  Once         05/18/24 1505     Place sequential compression device  Until discontinued         05/18/24 1505                    Discharge Planning   AJ: 5/27/2024     Code Status: Full Code   Is the patient medically ready for discharge?:     Reason for patient still in hospital (select all that apply): Pending disposition  Discharge Plan A: Skilled Nursing Facility                  Duyen Joy MD  Department of Hospital Medicine   [facility] S[facility] ([place]-[facility])

## 2024-05-28 LAB
ANION GAP SERPL CALC-SCNC: 7 MMOL/L (ref 8–16)
BACTERIA BLD CULT: NORMAL
BACTERIA BLD CULT: NORMAL
BASOPHILS # BLD AUTO: ABNORMAL K/UL (ref 0–0.2)
BASOPHILS NFR BLD: 0 % (ref 0–1.9)
BUN SERPL-MCNC: 7 MG/DL (ref 8–23)
CALCIUM SERPL-MCNC: 8.8 MG/DL (ref 8.7–10.5)
CHLORIDE SERPL-SCNC: 96 MMOL/L (ref 95–110)
CO2 SERPL-SCNC: 28 MMOL/L (ref 23–29)
CREAT SERPL-MCNC: 0.6 MG/DL (ref 0.5–1.4)
DIFFERENTIAL METHOD BLD: ABNORMAL
EOSINOPHIL # BLD AUTO: ABNORMAL K/UL (ref 0–0.5)
EOSINOPHIL NFR BLD: 1 % (ref 0–8)
ERYTHROCYTE [DISTWIDTH] IN BLOOD BY AUTOMATED COUNT: 14.5 % (ref 11.5–14.5)
EST. GFR  (NO RACE VARIABLE): >60 ML/MIN/1.73 M^2
GLUCOSE SERPL-MCNC: 89 MG/DL (ref 70–110)
HCT VFR BLD AUTO: 30.8 % (ref 37–48.5)
HGB BLD-MCNC: 10.1 G/DL (ref 12–16)
IMM GRANULOCYTES # BLD AUTO: ABNORMAL K/UL (ref 0–0.04)
IMM GRANULOCYTES NFR BLD AUTO: ABNORMAL % (ref 0–0.5)
LYMPHOCYTES # BLD AUTO: ABNORMAL K/UL (ref 1–4.8)
LYMPHOCYTES NFR BLD: 12 % (ref 18–48)
MAGNESIUM SERPL-MCNC: 1.5 MG/DL (ref 1.6–2.6)
MCH RBC QN AUTO: 29.4 PG (ref 27–31)
MCHC RBC AUTO-ENTMCNC: 32.8 G/DL (ref 32–36)
MCV RBC AUTO: 90 FL (ref 82–98)
METAMYELOCYTES NFR BLD MANUAL: 1 %
MONOCYTES # BLD AUTO: ABNORMAL K/UL (ref 0.3–1)
MONOCYTES NFR BLD: 8 % (ref 4–15)
MYELOCYTES NFR BLD MANUAL: 5 %
NEUTROPHILS NFR BLD: 72 % (ref 38–73)
NEUTS BAND NFR BLD MANUAL: 1 %
NRBC BLD-RTO: 0 /100 WBC
PHOSPHATE SERPL-MCNC: 2.7 MG/DL (ref 2.7–4.5)
PLATELET # BLD AUTO: 331 K/UL (ref 150–450)
PLATELET BLD QL SMEAR: ABNORMAL
PMV BLD AUTO: 10.5 FL (ref 9.2–12.9)
POTASSIUM SERPL-SCNC: 3.9 MMOL/L (ref 3.5–5.1)
RBC # BLD AUTO: 3.43 M/UL (ref 4–5.4)
SODIUM SERPL-SCNC: 131 MMOL/L (ref 136–145)
WBC # BLD AUTO: 10.32 K/UL (ref 3.9–12.7)

## 2024-05-28 PROCEDURE — 36415 COLL VENOUS BLD VENIPUNCTURE: CPT | Performed by: STUDENT IN AN ORGANIZED HEALTH CARE EDUCATION/TRAINING PROGRAM

## 2024-05-28 PROCEDURE — 85027 COMPLETE CBC AUTOMATED: CPT | Performed by: STUDENT IN AN ORGANIZED HEALTH CARE EDUCATION/TRAINING PROGRAM

## 2024-05-28 PROCEDURE — 85007 BL SMEAR W/DIFF WBC COUNT: CPT | Performed by: STUDENT IN AN ORGANIZED HEALTH CARE EDUCATION/TRAINING PROGRAM

## 2024-05-28 PROCEDURE — 94799 UNLISTED PULMONARY SVC/PX: CPT

## 2024-05-28 PROCEDURE — 63600175 PHARM REV CODE 636 W HCPCS: Performed by: HOSPITALIST

## 2024-05-28 PROCEDURE — 25000003 PHARM REV CODE 250: Performed by: STUDENT IN AN ORGANIZED HEALTH CARE EDUCATION/TRAINING PROGRAM

## 2024-05-28 PROCEDURE — 11000001 HC ACUTE MED/SURG PRIVATE ROOM

## 2024-05-28 PROCEDURE — 25000003 PHARM REV CODE 250

## 2024-05-28 PROCEDURE — 94761 N-INVAS EAR/PLS OXIMETRY MLT: CPT

## 2024-05-28 PROCEDURE — 84100 ASSAY OF PHOSPHORUS: CPT | Performed by: STUDENT IN AN ORGANIZED HEALTH CARE EDUCATION/TRAINING PROGRAM

## 2024-05-28 PROCEDURE — A4216 STERILE WATER/SALINE, 10 ML: HCPCS | Performed by: STUDENT IN AN ORGANIZED HEALTH CARE EDUCATION/TRAINING PROGRAM

## 2024-05-28 PROCEDURE — 25000003 PHARM REV CODE 250: Performed by: INTERNAL MEDICINE

## 2024-05-28 PROCEDURE — 63600175 PHARM REV CODE 636 W HCPCS: Performed by: STUDENT IN AN ORGANIZED HEALTH CARE EDUCATION/TRAINING PROGRAM

## 2024-05-28 PROCEDURE — 80048 BASIC METABOLIC PNL TOTAL CA: CPT | Performed by: STUDENT IN AN ORGANIZED HEALTH CARE EDUCATION/TRAINING PROGRAM

## 2024-05-28 PROCEDURE — 83735 ASSAY OF MAGNESIUM: CPT | Performed by: STUDENT IN AN ORGANIZED HEALTH CARE EDUCATION/TRAINING PROGRAM

## 2024-05-28 PROCEDURE — 97116 GAIT TRAINING THERAPY: CPT

## 2024-05-28 PROCEDURE — 63600175 PHARM REV CODE 636 W HCPCS

## 2024-05-28 PROCEDURE — 25000003 PHARM REV CODE 250: Performed by: HOSPITALIST

## 2024-05-28 PROCEDURE — 94640 AIRWAY INHALATION TREATMENT: CPT

## 2024-05-28 RX ORDER — GABAPENTIN 300 MG/1
300 CAPSULE ORAL 3 TIMES DAILY
Start: 2024-05-28 | End: 2025-05-28

## 2024-05-28 RX ORDER — LOSARTAN POTASSIUM 50 MG/1
50 TABLET ORAL DAILY
Status: DISCONTINUED | OUTPATIENT
Start: 2024-05-28 | End: 2024-05-29 | Stop reason: HOSPADM

## 2024-05-28 RX ORDER — MAGNESIUM SULFATE HEPTAHYDRATE 40 MG/ML
2 INJECTION, SOLUTION INTRAVENOUS
Status: COMPLETED | OUTPATIENT
Start: 2024-05-28 | End: 2024-05-28

## 2024-05-28 RX ORDER — ERGOCALCIFEROL 1.25 MG/1
50000 CAPSULE ORAL
Start: 2024-06-02

## 2024-05-28 RX ADMIN — VALACYCLOVIR HYDROCHLORIDE 1000 MG: 500 TABLET, FILM COATED ORAL at 08:05

## 2024-05-28 RX ADMIN — CETIRIZINE HYDROCHLORIDE 5 MG: 5 TABLET, FILM COATED ORAL at 08:05

## 2024-05-28 RX ADMIN — LORAZEPAM 1 MG: 0.5 TABLET ORAL at 12:05

## 2024-05-28 RX ADMIN — OXYCODONE 5 MG: 5 TABLET ORAL at 10:05

## 2024-05-28 RX ADMIN — CEFAZOLIN 2 G: 2 INJECTION, POWDER, FOR SOLUTION INTRAMUSCULAR; INTRAVENOUS at 03:05

## 2024-05-28 RX ADMIN — PREDNISOLONE ACETATE 1 DROP: 10 SUSPENSION/ DROPS OPHTHALMIC at 08:05

## 2024-05-28 RX ADMIN — MAGNESIUM SULFATE HEPTAHYDRATE 2 G: 40 INJECTION, SOLUTION INTRAVENOUS at 11:05

## 2024-05-28 RX ADMIN — CARBOXYMETHYLCELLULOSE SODIUM 1 DROP: 10 GEL OPHTHALMIC at 08:05

## 2024-05-28 RX ADMIN — ERYTHROMYCIN: 5 OINTMENT OPHTHALMIC at 08:05

## 2024-05-28 RX ADMIN — CEFAZOLIN 2 G: 2 INJECTION, POWDER, FOR SOLUTION INTRAMUSCULAR; INTRAVENOUS at 08:05

## 2024-05-28 RX ADMIN — Medication 10 ML: at 11:05

## 2024-05-28 RX ADMIN — ATROPINE SULFATE 1 DROP: 10 SOLUTION/ DROPS OPHTHALMIC at 08:05

## 2024-05-28 RX ADMIN — ONDANSETRON 4 MG: 2 INJECTION INTRAMUSCULAR; INTRAVENOUS at 06:05

## 2024-05-28 RX ADMIN — ONDANSETRON 4 MG: 2 INJECTION INTRAMUSCULAR; INTRAVENOUS at 11:05

## 2024-05-28 RX ADMIN — Medication 10 ML: at 12:05

## 2024-05-28 RX ADMIN — LOSARTAN POTASSIUM 50 MG: 50 TABLET, FILM COATED ORAL at 11:05

## 2024-05-28 RX ADMIN — OXYCODONE 5 MG: 5 TABLET ORAL at 03:05

## 2024-05-28 RX ADMIN — LISINOPRIL 20 MG: 20 TABLET ORAL at 08:05

## 2024-05-28 RX ADMIN — FLUTICASONE PROPIONATE 100 MCG: 50 SPRAY, METERED NASAL at 08:05

## 2024-05-28 RX ADMIN — Medication 10 ML: at 05:05

## 2024-05-28 RX ADMIN — GABAPENTIN 300 MG: 300 CAPSULE ORAL at 08:05

## 2024-05-28 RX ADMIN — VALACYCLOVIR HYDROCHLORIDE 1000 MG: 500 TABLET, FILM COATED ORAL at 03:05

## 2024-05-28 RX ADMIN — CEFAZOLIN 2 G: 2 INJECTION, POWDER, FOR SOLUTION INTRAMUSCULAR; INTRAVENOUS at 12:05

## 2024-05-28 RX ADMIN — ENOXAPARIN SODIUM 40 MG: 40 INJECTION SUBCUTANEOUS at 05:05

## 2024-05-28 RX ADMIN — GABAPENTIN 300 MG: 300 CAPSULE ORAL at 03:05

## 2024-05-28 RX ADMIN — CARBOXYMETHYLCELLULOSE SODIUM 1 DROP: 10 GEL OPHTHALMIC at 01:05

## 2024-05-28 RX ADMIN — CARVEDILOL 25 MG: 25 TABLET, FILM COATED ORAL at 08:05

## 2024-05-28 RX ADMIN — OXYCODONE 5 MG: 5 TABLET ORAL at 05:05

## 2024-05-28 RX ADMIN — PREDNISOLONE ACETATE 1 DROP: 10 SUSPENSION/ DROPS OPHTHALMIC at 01:05

## 2024-05-28 RX ADMIN — OXYCODONE 5 MG: 5 TABLET ORAL at 01:05

## 2024-05-28 RX ADMIN — MAGNESIUM SULFATE HEPTAHYDRATE 2 G: 40 INJECTION, SOLUTION INTRAVENOUS at 09:05

## 2024-05-28 RX ADMIN — FLUTICASONE FUROATE AND VILANTEROL TRIFENATATE 1 PUFF: 100; 25 POWDER RESPIRATORY (INHALATION) at 09:05

## 2024-05-28 RX ADMIN — ERYTHROMYCIN: 5 OINTMENT OPHTHALMIC at 01:05

## 2024-05-28 RX ADMIN — Medication 10 ML: at 06:05

## 2024-05-28 RX ADMIN — CEFAZOLIN 2 G: 2 INJECTION, POWDER, FOR SOLUTION INTRAMUSCULAR; INTRAVENOUS at 11:05

## 2024-05-28 NOTE — PLAN OF CARE
[facility] - Med Surg ([facility])  Discharge Reassessment    Primary Care Provider: Oscar Shafer MD    Expected Discharge Date: 5/28/2024    Reassessment (most recent)       Discharge Reassessment - 05/27/24 1215          Discharge Reassessment    Assessment Type Discharge Planning Reassessment (P)      Did the patient's condition or plan change since previous assessment? No (P)      Discharge Plan discussed with: Spouse/sig other (P)      Name(s) and Number(s) Sukhwinder Dial (Spouse)  [phone] (P)      Communicated AJ with patient/caregiver Yes (P)      Discharge Plan A Skilled Nursing Facility (P)      Discharge Plan B Home Health;Home with family (P)      DME Needed Upon Discharge  none (P)      Transition of Care Barriers None (P)      Why the patient remains in the hospital Requires continued medical care (P)         Post-Acute Status    Post-Acute Authorization Placement (P)      Post-Acute Placement Status Pending payor review/awaiting authorization (if required) (P)      Coverage [employer] MANAGED MEDICARE - [employer] MEDICARE HMO - (P)                  Discharge Plan A and Plan B have been determined by review of patient's clinical status, future medical and therapeutic needs, and coverage/benefits for post-acute care in coordination with multidisciplinary team members.                     MARCELO Munoz, LMSW  [facility]  Case Management  Ext. [phone]

## 2024-05-28 NOTE — PT/OT/SLP PROGRESS
Physical Therapy Treatment    Patient Name:  Jessi Dial   MRN:  [id number]    Recommendations:     Discharge Recommendations: Low Intensity Therapy  Discharge Equipment Recommendations: none  Barriers to discharge: None    Assessment:     Jessi Dial is a 68 y.o. female admitted with a medical diagnosis of Herpes zoster ophthalmicus of left eye.  She presents with the following impairments/functional limitations: weakness, impaired endurance, impaired self care skills, impaired functional mobility, gait instability, impaired balance, pain Pt. cooperative and tolerated treatment well. Pt. progressing with mobility.    Rehab Prognosis: Good; patient would benefit from acute skilled PT services to address these deficits and reach maximum level of function.    Recent Surgery: * No surgery found *      Plan:     During this hospitalization, patient to be seen 3 x/week to address the identified rehab impairments via gait training, therapeutic activities, therapeutic exercises and progress toward the following goals:    Plan of Care Expires:  06/25/24    Subjective     Chief Complaint: head and (L) eye discomfort  Patient/Family Comments/goals: pt. Agreeable to PT  Pain/Comfort:  Pain Rating 1: 8/10  Location - Side 1: Left  Location - Orientation 1: generalized  Location 1:  (head and (L) eye)  Pain Addressed 1: Reposition, Distraction, Cessation of Activity, Nurse notified  Pain Rating Post-Intervention 1: 8/10      Objective:     Communicated with nursing prior to session.  Patient found ambulatory in room/benson with peripheral IV upon PT entry to room.     General Precautions: Standard, fall  Orthopedic Precautions: N/A  Braces: N/A  Respiratory Status: Room air     Functional Mobility:  Bed Mobility:     Sit to Supine: stand by assistance  Transfers:     Sit to Stand:  stand by assistance and contact guard assistance with rolling walker  Gait: 15' and 120' with RW and SBA-CGA with decreased step length/danni without  LOB. Pt. had seated rest break between gait trials.  Balance: fair      AM-PAC 6 CLICK MOBILITY  Turning over in bed (including adjusting bedclothes, sheets and blankets)?: 3  Sitting down on and standing up from a chair with arms (e.g., wheelchair, bedside commode, etc.): 3  Moving from lying on back to sitting on the side of the bed?: 3  Moving to and from a bed to a chair (including a wheelchair)?: 3  Need to walk in hospital room?: 3  Climbing 3-5 steps with a railing?: 3  Basic Mobility Total Score: 18       Treatment & Education:  Discussed pt.'s progress, goals, and PT. Assisted pt. to/from bathroom and on/off toilet.    Patient left supine with all lines intact and call button in reach..    GOALS:   Multidisciplinary Problems       Physical Therapy Goals          Problem: Physical Therapy    Goal Priority Disciplines Outcome Goal Variances Interventions   Physical Therapy Goal     PT, PT/OT Progressing     Description: Goals to be met by: 24     Patient will increase functional independence with mobility by performin. Supine to sit with [place]  2. Sit to stand transfer with Modified [place]  3. Bed to chair transfer with Modified [place] using LRAD  4. Gait  x 150 feet with Modified [place] using LRAD.   5. Stand for 10 minutes with Modified [place] using LRAD  6. Lower extremity exercise program x15 reps per handout, with independence                         Time Tracking:     PT Received On: 24  PT Start Time: 1331     PT Stop Time: 1343  PT Total Time (min): 12 min     Billable Minutes: Gait Training 12    Treatment Type: Treatment  PT/PTA: PT     Number of PTA visits since last PT visit: 0     2024

## 2024-05-28 NOTE — PLAN OF CARE
Problem: Adult Inpatient Plan of Care  Goal: Plan of Care Review  5/28/2024 0600 by Griffin Sood LPN  Outcome: Progressing  5/28/2024 0149 by Griffin Sood LPN  Outcome: Progressing  Goal: Patient-Specific Goal (Individualized)  5/28/2024 0600 by Griffin Sood LPN  Outcome: Progressing  5/28/2024 0149 by Griffin Sood LPN  Outcome: Progressing  Goal: Absence of Hospital-Acquired Illness or Injury  5/28/2024 0600 by Griffin Sood LPN  Outcome: Progressing  5/28/2024 0149 by Griffin Sood LPN  Outcome: Progressing  Goal: Optimal Comfort and Wellbeing  5/28/2024 0600 by Griffin Sood LPN  Outcome: Progressing  5/28/2024 0149 by Griffin Sood LPN  Outcome: Progressing  Goal: Readiness for Transition of Care  5/28/2024 0600 by Griffin Sood LPN  Outcome: Progressing  5/28/2024 0149 by Griffin Sood LPN  Outcome: Progressing

## 2024-05-28 NOTE — PLAN OF CARE
Problem: Adult Inpatient Plan of Care  Goal: Plan of Care Review  5/28/2024 1748 by Barthelemy, Racquel, LPN  Outcome: Progressing  5/28/2024 1731 by Barthelemy, Racquel, LPN  Outcome: Progressing  Goal: Patient-Specific Goal (Individualized)  5/28/2024 1748 by Barthelemy, Racquel, LPN  Outcome: Progressing  5/28/2024 1731 by Barthelemy, Racquel, LPN  Outcome: Progressing  Goal: Absence of Hospital-Acquired Illness or Injury  5/28/2024 1748 by Barthelemy, Racquel, LPN  Outcome: Progressing  5/28/2024 1731 by Barthelemy, Racquel, LPN  Outcome: Progressing  Goal: Optimal Comfort and Wellbeing  5/28/2024 1748 by Barthelemy, Racquel, LPN  Outcome: Progressing  5/28/2024 1731 by Barthelemy, Racquel, LPN  Outcome: Progressing  Goal: Readiness for Transition of Care  5/28/2024 1748 by Barthelemy, Racquel, LPN  Outcome: Progressing  5/28/2024 1731 by Barthelemy, Racquel, LPN  Outcome: Progressing     Problem: Skin Injury Risk Increased  Goal: Skin Health and Integrity  5/28/2024 1748 by Barthelemy, Racquel, LPN  Outcome: Progressing  5/28/2024 1731 by Barthelemy, Racquel, LPN  Outcome: Progressing     Problem: Infection  Goal: Absence of Infection Signs and Symptoms  5/28/2024 1748 by Barthelemy, Racquel, LPN  Outcome: Progressing  5/28/2024 1731 by Barthelemy, Racquel, LPN  Outcome: Progressing     Problem: Pain Acute  Goal: Optimal Pain Control and Function  5/28/2024 1748 by Barthelemy, Racquel, LPN  Outcome: Progressing  5/28/2024 1731 by Barthelemy, Racquel, LPN  Outcome: Progressing

## 2024-05-28 NOTE — DISCHARGE SUMMARY
[facility] - Med Surg ([address] Medicine  Discharge Summary      Patient Name: Jessi Dial  MRN: [id number]  SELVIN: [id number]  Patient Class: IP- Inpatient  Admission Date: 5/18/2024  Hospital Length of Stay: 10 days  Discharge Date and Time:  05/28/2024 2:32 PM  Attending Physician: Hernando Amos MD   Discharging Provider: Duyen Joy MD  Primary Care Provider: Oscar Shafer MD  [facility] Medicine Team: [facility] MED 3 Duyen Joy MD  Primary Care Team: [facility] 3    HPI:   Jessi Dial onesimo  69 yo W w/ PMH of asthma, COPD, seizures, and neurofibromatosis who presented to [facility] ED for persistent pain tot he left side of her face and was transferred to [facility] for inpatient ophthalmology evaluation. She reports that about 5 weeks ago she started having pain b/l below her breasts that later developed into an itchy and painful foul-smelling rash. This has been improving. She presented initially to the ED on 04/23 for L-sided headache and ear pain with N/V and was treated and discharged. She then presented to [facility] ED on 04/26 for a rash with an associated burning sensation that covered the L forehead and upper eyelid. The ED provider noted that she had no Pemberton sign and fluorescein staining of the L eye showed no dendritic lesions. She was discharged with valacyclovir and prednisolone eye drops but presented to the ED again yesterday as the pain had continued to persist despite the medications. She was followed outpatient by her PCP and an ophthalmologist. The vesicular lesions have begun to crust over but she is continuing to have significant pain over her L eye and struggles to keep it open. She reports that she was started on gabapentin outpatient for additional pain control and that it initially made her drowsy and confused but that those symptoms resolved as she continued taking it. Reports vision changes due to eye discharge and pain. Denies fever, chills,  hearing changes, abdominal pain, shortness of breath, cough, chest pain.    In the OSH ED, she was hypertensive and tachycardic, afebrile. Labs notable for leukocytosis to 13.11 and Na 134. CTH without evidence of acute intracranial abnormality but notable for numerous scalp neurofibromas. Received a dose of IV acyclovir. Transferred here for further evaluation and management.     * No surgery found *      Hospital Course:   Admitted to hospital medicine for herpes zoster ophthalmicus. Started on IV acyclovir. Ophthalmology consulted and added several eye drops. ID consulted with recs to transition to PO valtrex for 14 days upon discharge. Swelling and vision with mild improvement noted on 5/20/24 but significant pain still present. Became febrile and tachycardic with a new leukocytosis on 05/21, started on vanc + rocephin. CXR without an acute intrathoracic process. BCx growing GPCs and rapid ID showing MSSA, deescalated to ancef. CT maxillofacial showing mild paranasal sinus disease with aerated secretions in L sphenoid sinus and innumerable cutaneous nodules consistent with neurofibromatosis. ID re-consulted for new bacteremia. Repeat BCx NGTD. RUE US showing thrombophlebitis. TTE with EF 60-65% and without valvular vegetations. Zoster lesions visualized to be scabbed over and dry, isolation precautions discontinued. Midline placed for continued IV abx therapy, end date to be 06/20/24. IV acyclovir transitioning to Valtrex on discharge, to be continued until f/u with ophthalmologist in [place]. She has been medically stable with improving rash and pain. Discharging to SNF today.     Goals of Care Treatment Preferences:  Code Status: Full Code    Vitals:    05/29/24 0835   BP: (!) 172/72   Pulse: 74   Resp: 18   Temp: 98.2 °F (36.8 °C)     Physical Exam  Vitals and nursing note reviewed.   Constitutional:       General: She is not in acute distress.  HENT:      Head: Normocephalic and atraumatic.      Comments:    Dry scabbed-over lesions along the L V1 dermatome are improving  Erythema over L eyelid and forehead is improving   Diffuse neurofibromas present over the body surface     Mouth/Throat:      Mouth: Mucous membranes are moist.   Eyes:      Comments: More able to open eyes   Cardiovascular:      Rate and Rhythm: Normal rate and regular rhythm.   Pulmonary:      Effort: Pulmonary effort is normal. No respiratory distress.   Musculoskeletal:      Right lower leg: No edema.      Left lower leg: No edema.   Skin:     General: Skin is warm and dry.      Findings: Lesion and rash present.      Comments:   Scabbed vesicular lesions on left brow, forehead, and scalp  Diffuse neurofibromas    Neurological:      General: No focal deficit present.      Mental Status: She is alert and oriented to person, place, and time.   Psychiatric:         Mood and Affect: Mood normal.         Behavior: Behavior normal.         Consults:   Consults (From admission, onward)          Status Ordering Provider     [facility]  Once        Provider:  (Not yet assigned)    Completed KE AGUILA     Inpatient [facility]ICC team [facility])  Once        Provider:  (Not yet assigned)    Completed BRANDON PATEL     Inpatient consult to Infectious Diseases  Once        Provider:  (Not yet assigned)    Completed SELENA LAND     Inpatient consult to Infectious Diseases  Once        Provider:  (Not yet assigned)    Completed SELENA LAND     Inpatient consult to Ophthalmology  Once        Provider:  (Not yet assigned)    Completed SELENA LAND            No new Assessment & Plan notes have been filed under this hospital service since the last note was generated.  Service: Hospital Medicine    Final Active Diagnoses:    Diagnosis Date Noted POA    PRINCIPAL PROBLEM:  Herpes zoster ophthalmicus of left eye [B02.30] 05/18/2024 Yes    Needs peripherally inserted central catheter (PICC) [Z45.2] 05/25/2024 Not Applicable     Infection requiring contact isolation precautions [B99.9] 05/25/2024 Yes    MSSA bacteremia [R78.81, B95.61] 05/22/2024 No    Elevated BP without diagnosis of hypertension [R03.0] 05/20/2024 Yes    Anxiety [F41.9] 05/18/2024 Yes    Neurofibromatosis [Q85.00] 02/25/2018 Yes     Chronic    Asthma [J45.909] 02/25/2018 Yes     Chronic      Problems Resolved During this Admission:       Discharged Condition: stable    Disposition:     Follow Up:    Patient Instructions:   No discharge procedures on file.    Significant Diagnostic Studies: N/A    Pending Diagnostic Studies:       Procedure Component Value Units Date/Time    CBC auto differential [[id number]]     Order Status: Sent Lab Status: No result     Specimen: Blood     Magnesium [[id number]]     Order Status: Sent Lab Status: No result     Specimen: Blood     Phosphorus [[id number]]     Order Status: Sent Lab Status: No result     Specimen: Blood     Vitamin C [[id number]] Collected: 05/26/24 1321    Order Status: Sent Lab Status: In process Updated: 05/26/24 1356    Specimen: Blood            Medications:  Reconciled Home Medications:      Medication List        START taking these medications      atropine 1% 1 % Drop  Commonly known as: ISOPTO ATROPINE  Place 1 drop into the left eye once daily.     carboxymethylcellulose sodium 1 % Dpge  Apply 1 drop to eye 4 (four) times daily.     carvediloL 25 MG tablet  Commonly known as: COREG  Take 1 tablet (25 mg total) by mouth 2 (two) times daily.     dextrose 5 % in water (D5W) PgBk 50 mL with ceFAZolin 2 gram SolR 2 g  Inject 2 g into the vein every 8 (eight) hours. for 23 days     diphenhydrAMINE 25 mg capsule  Commonly known as: BENADRYL  Take 1 capsule (25 mg total) by mouth every 4 (four) hours as needed for Itching.     ergocalciferol 50,000 unit Cap  Commonly known as: ERGOCALCIFEROL  Take 1 capsule (50,000 Units total) by mouth every 7 days.  Start taking on: June 2, 2024     erythromycin ophthalmic  ointment  Commonly known as: ROMYCIN  Place into the left eye 4 (four) times daily.     valACYclovir 1000 MG tablet  Commonly known as: VALTREX  Take 1 tablet (1,000 mg total) by mouth 3 (three) times daily.            CHANGE how you take these medications      gabapentin 300 MG capsule  Commonly known as: NEURONTIN  Take 1 capsule (300 mg total) by mouth 3 (three) times daily.  What changed:   medication strength  how much to take            CONTINUE taking these medications      acetaminophen 325 MG tablet  Commonly known as: TYLENOL  Take 2 tablets (650 mg total) by mouth every 4 (four) hours as needed.     budesonide-formoterol 80-4.5 mcg 80-4.5 mcg/actuation Hfaa  Commonly known as: SYMBICORT  Inhale 2 puffs into the lungs 2 (two) times daily. Controller     fluticasone propionate 50 mcg/actuation nasal spray  Commonly known as: FLONASE  1 spray (50 mcg total) by Each Nostril route 2 (two) times daily as needed for Rhinitis.     levocetirizine 5 MG tablet  Commonly known as: XYZAL  Take 1 tablet (5 mg total) by mouth every evening.     LORazepam 1 MG tablet  Commonly known as: ATIVAN  Take 1 mg by mouth nightly as needed for Anxiety.     losartan 50 MG tablet  Commonly known as: COZAAR  Take 50 mg by mouth once daily.     prednisoLONE acetate 1 % Drps  Commonly known as: PRED FORTE  Place 1 drop into the left eye 4 (four) times daily.              Indwelling Lines/Drains at time of discharge:   Lines/Drains/Airways       None                   Time spent on the discharge of patient: 48 minutes         Duyen Joy MD  Department of Hospital Medicine  [facility] ([place])

## 2024-05-28 NOTE — PLAN OF CARE
SW provided patient w/ outpatient mental health resources per patient/family's request.                  MARCELO Munoz, LMSW  [facility]  Case Management  Ext. [phone]

## 2024-05-28 NOTE — PLAN OF CARE
Discharge Plan A and Plan B have been determined by review of patient's clinical status, future medical and therapeutic needs, and coverage/benefits for post-acute care in coordination with multidisciplinary team members.    05/28/24 1012   Post-Acute Status   Post-Acute Authorization Placement   Post-Acute Placement Status Pending payor review/awaiting authorization (if required)   Coverage [employer] MEDICARE   Discharge Plan   Discharge Plan A Skilled Nursing Facility   Discharge Plan B Home with family;Home Health     STEPHANIE f/u w/ Monica ([facility]). Monica states that auth is still pending w/ [employer], submitted 5/27. Monica requesting labs for provider review. SW sent via care port. Patient's discharge to [facility] is pending payor review.    2:10pm  STEPHANIE received confirmation from Monica that auth has been approved for patient admit to [facility]. Report to be called to [phone]. Transport orders to follow.    STEPHANIE placed [facility] orders for patient transport to [facility].  Patient/family notified and agreeable to dc plan. Report to be called to [phone].    SW will continue to follow.                        MARCELO Munoz, LMSW  [facility]  Case Management  Ext. [phone]

## 2024-05-29 VITALS
BODY MASS INDEX: 29.27 KG/M2 | HEIGHT: 61 IN | SYSTOLIC BLOOD PRESSURE: 172 MMHG | WEIGHT: 155 LBS | RESPIRATION RATE: 18 BRPM | OXYGEN SATURATION: 94 % | TEMPERATURE: 98 F | HEART RATE: 74 BPM | DIASTOLIC BLOOD PRESSURE: 72 MMHG

## 2024-05-29 LAB
ANION GAP SERPL CALC-SCNC: 6 MMOL/L (ref 8–16)
ANISOCYTOSIS BLD QL SMEAR: SLIGHT
BASOPHILS NFR BLD: 0 % (ref 0–1.9)
BUN SERPL-MCNC: 6 MG/DL (ref 8–23)
CALCIUM SERPL-MCNC: 9 MG/DL (ref 8.7–10.5)
CHLORIDE SERPL-SCNC: 96 MMOL/L (ref 95–110)
CO2 SERPL-SCNC: 28 MMOL/L (ref 23–29)
CREAT SERPL-MCNC: 0.6 MG/DL (ref 0.5–1.4)
DIFFERENTIAL METHOD BLD: ABNORMAL
EOSINOPHIL NFR BLD: 1 % (ref 0–8)
ERYTHROCYTE [DISTWIDTH] IN BLOOD BY AUTOMATED COUNT: 14.1 % (ref 11.5–14.5)
EST. GFR  (NO RACE VARIABLE): >60 ML/MIN/1.73 M^2
GLUCOSE SERPL-MCNC: 88 MG/DL (ref 70–110)
HCT VFR BLD AUTO: 32.7 % (ref 37–48.5)
HGB BLD-MCNC: 11 G/DL (ref 12–16)
IMM GRANULOCYTES # BLD AUTO: ABNORMAL K/UL (ref 0–0.04)
IMM GRANULOCYTES NFR BLD AUTO: ABNORMAL % (ref 0–0.5)
LYMPHOCYTES NFR BLD: 7 % (ref 18–48)
MAGNESIUM SERPL-MCNC: 1.9 MG/DL (ref 1.6–2.6)
MCH RBC QN AUTO: 29.6 PG (ref 27–31)
MCHC RBC AUTO-ENTMCNC: 33.6 G/DL (ref 32–36)
MCV RBC AUTO: 88 FL (ref 82–98)
MONOCYTES NFR BLD: 7 % (ref 4–15)
NEUTROPHILS NFR BLD: 81 % (ref 38–73)
NEUTS BAND NFR BLD MANUAL: 4 %
NRBC BLD-RTO: 0 /100 WBC
PHOSPHATE SERPL-MCNC: 2.7 MG/DL (ref 2.7–4.5)
PLATELET # BLD AUTO: 391 K/UL (ref 150–450)
PLATELET BLD QL SMEAR: ABNORMAL
PMV BLD AUTO: 10.4 FL (ref 9.2–12.9)
POLYCHROMASIA BLD QL SMEAR: ABNORMAL
POTASSIUM SERPL-SCNC: 4 MMOL/L (ref 3.5–5.1)
RBC # BLD AUTO: 3.72 M/UL (ref 4–5.4)
SODIUM SERPL-SCNC: 130 MMOL/L (ref 136–145)
WBC # BLD AUTO: 10.03 K/UL (ref 3.9–12.7)

## 2024-05-29 PROCEDURE — 25000003 PHARM REV CODE 250

## 2024-05-29 PROCEDURE — 99900035 HC TECH TIME PER 15 MIN (STAT)

## 2024-05-29 PROCEDURE — 85027 COMPLETE CBC AUTOMATED: CPT | Performed by: STUDENT IN AN ORGANIZED HEALTH CARE EDUCATION/TRAINING PROGRAM

## 2024-05-29 PROCEDURE — 25000003 PHARM REV CODE 250: Performed by: STUDENT IN AN ORGANIZED HEALTH CARE EDUCATION/TRAINING PROGRAM

## 2024-05-29 PROCEDURE — 63600175 PHARM REV CODE 636 W HCPCS: Performed by: STUDENT IN AN ORGANIZED HEALTH CARE EDUCATION/TRAINING PROGRAM

## 2024-05-29 PROCEDURE — 63600175 PHARM REV CODE 636 W HCPCS: Performed by: HOSPITALIST

## 2024-05-29 PROCEDURE — A4216 STERILE WATER/SALINE, 10 ML: HCPCS | Performed by: STUDENT IN AN ORGANIZED HEALTH CARE EDUCATION/TRAINING PROGRAM

## 2024-05-29 PROCEDURE — 83735 ASSAY OF MAGNESIUM: CPT | Performed by: STUDENT IN AN ORGANIZED HEALTH CARE EDUCATION/TRAINING PROGRAM

## 2024-05-29 PROCEDURE — 84100 ASSAY OF PHOSPHORUS: CPT | Performed by: STUDENT IN AN ORGANIZED HEALTH CARE EDUCATION/TRAINING PROGRAM

## 2024-05-29 PROCEDURE — 25000003 PHARM REV CODE 250: Performed by: HOSPITALIST

## 2024-05-29 PROCEDURE — 85007 BL SMEAR W/DIFF WBC COUNT: CPT | Performed by: STUDENT IN AN ORGANIZED HEALTH CARE EDUCATION/TRAINING PROGRAM

## 2024-05-29 PROCEDURE — 80048 BASIC METABOLIC PNL TOTAL CA: CPT | Performed by: STUDENT IN AN ORGANIZED HEALTH CARE EDUCATION/TRAINING PROGRAM

## 2024-05-29 PROCEDURE — 25000003 PHARM REV CODE 250: Performed by: INTERNAL MEDICINE

## 2024-05-29 PROCEDURE — 36415 COLL VENOUS BLD VENIPUNCTURE: CPT | Performed by: STUDENT IN AN ORGANIZED HEALTH CARE EDUCATION/TRAINING PROGRAM

## 2024-05-29 RX ADMIN — Medication 10 ML: at 05:05

## 2024-05-29 RX ADMIN — CEFAZOLIN 2 G: 2 INJECTION, POWDER, FOR SOLUTION INTRAMUSCULAR; INTRAVENOUS at 08:05

## 2024-05-29 RX ADMIN — OXYCODONE 5 MG: 5 TABLET ORAL at 02:05

## 2024-05-29 RX ADMIN — CETIRIZINE HYDROCHLORIDE 5 MG: 5 TABLET, FILM COATED ORAL at 08:05

## 2024-05-29 RX ADMIN — LORAZEPAM 1 MG: 0.5 TABLET ORAL at 10:05

## 2024-05-29 RX ADMIN — VALACYCLOVIR HYDROCHLORIDE 1000 MG: 500 TABLET, FILM COATED ORAL at 08:05

## 2024-05-29 RX ADMIN — ONDANSETRON 4 MG: 2 INJECTION INTRAMUSCULAR; INTRAVENOUS at 11:05

## 2024-05-29 RX ADMIN — LOSARTAN POTASSIUM 50 MG: 50 TABLET, FILM COATED ORAL at 08:05

## 2024-05-29 RX ADMIN — GABAPENTIN 300 MG: 300 CAPSULE ORAL at 08:05

## 2024-05-29 RX ADMIN — CARVEDILOL 25 MG: 25 TABLET, FILM COATED ORAL at 08:05

## 2024-05-29 NOTE — PROGRESS NOTES
[facility] - Med Surg ([address] Medicine  Progress Note    Patient Name: Jessi Dial  MRN: [id number]  Patient Class: IP- Inpatient   Admission Date: 5/18/2024  Length of Stay: 11 days  Attending Physician: Hernando Amos MD  Primary Care Provider: Oscar Shafer MD        Subjective:     Principal Problem:Herpes zoster ophthalmicus of left eye        HPI:  Jessi Dial onesimo  69 yo W w/ PMH of asthma, COPD, seizures, and neurofibromatosis who presented to [facility] ED for persistent pain tot he left side of her face [facility]. She reports that about 5 weeks ago she started having pain b/l below her breasts that later developed into an itchy and painful foul-smelling rash. This has been improving. She presented initially to the ED on 04/23 for L-sided headache and ear pain with N/V and was treated and discharged. She then presented to [facility] ED on 04/26 for a rash with an associated burning sensation that covered the L forehead and upper eyelid. The ED provider noted that she had no Pemberton sign and fluorescein staining of the L eye showed no dendritic lesions. She was discharged with valacyclovir and prednisolone eye drops but presented to the ED again yesterday as the pain had continued to persist despite the medications. She was followed outpatient by her PCP and an ophthalmologist. The vesicular lesions have begun to crust over but she is continuing to have significant pain over her L eye and struggles to keep it open. She reports that she was started on gabapentin outpatient for additional pain control and that it initially made her drowsy and confused but that those symptoms resolved as she continued taking it. Reports vision changes due to eye discharge and pain. Denies fever, chills, hearing changes, abdominal pain, shortness of breath, cough, chest pain.    In the OSH ED, she was hypertensive and tachycardic,  afebrile. Labs notable for leukocytosis to 13.11 and Na 134. CTH without evidence of acute intracranial abnormality but notable for numerous scalp neurofibromas. Received a dose of IV acyclovir. Transferred here for further evaluation and management.     Overview/Hospital Course:  Admitted to hospital medicine for herpes zoster ophthalmicus. Started on IV acyclovir. Ophthalmology consulted and added several eye drops. ID consulted with recs to transition to PO valtrex for 14 days upon discharge. Swelling and vision with mild improvement noted on 5/20/24 but significant pain still present. Became febrile and tachycardic with a new leukocytosis on 05/21, started on vanc + rocephin. CXR without an acute intrathoracic process. BCx growing GPCs and rapid ID showing MSSA, deescalated to ancef. CT maxillofacial showing mild paranasal sinus disease with aerated secretions in L sphenoid sinus and innumerable cutaneous nodules consistent with neurofibromatosis. ID re-consulted for new bacteremia. Repeat BCx NGTD. RUE US showing thrombophlebitis. TTE with EF 60-65% and without valvular vegetations. Zoster lesions visualized to be scabbed over and dry, isolation precautions discontinued. Midline placed for continued IV abx therapy, end date to be 06/20/24. IV acyclovir transitioning to Valtrex on discharge, to be continued until f/u with ophthalmologist in [place]. She has been medically stable with improving rash and pain. Discharging to SNF.    Interval History: NAEO. Pain improving, eyes more open. Still fells drowsy from gabapentin.    Review of Systems   Constitutional:  Positive for fatigue.   Eyes:  Positive for photophobia, pain, discharge and visual disturbance.   Respiratory:  Negative for cough and shortness of breath.    Cardiovascular:  Negative for chest pain.   Gastrointestinal:  Negative for abdominal pain, nausea and vomiting.   Skin:  Positive for rash.     Objective:     Vital Signs (Most Recent):  Temp:  98.7 °F (37.1 °C) (05/29/24 0450)  Pulse: 80 (05/29/24 0450)  Resp: 18 (05/29/24 0450)  BP: (!) 143/67 (05/29/24 0450)  SpO2: 97 % (05/29/24 0450) Vital Signs (24h Range):  Temp:  [98.2 °F (36.8 °C)-98.8 °F (37.1 °C)] 98.7 °F (37.1 °C)  Pulse:  [78-84] 80  Resp:  [17-18] 18  SpO2:  [92 %-97 %] 97 %  BP: (143-176)/(67-96) 143/67     Weight: 70.3 kg (155 lb)  Body mass index is 29.29 kg/m².    Intake/Output Summary (Last 24 hours) at 5/29/2024 0833  Last data filed at 5/29/2024 0627  Gross per 24 hour   Intake 2012.05 ml   Output --   Net 2012.05 ml         Physical Exam  Vitals and nursing note reviewed.   Constitutional:       General: She is not in acute distress.  HENT:      Head: Normocephalic and atraumatic.      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   Eyes:      Comments: More able to open eyes   Cardiovascular:      Rate and Rhythm: Normal rate and regular rhythm.   Pulmonary:      Effort: Pulmonary effort is normal. No respiratory distress.   Musculoskeletal:      Right lower leg: No edema.      Left lower leg: No edema.   Skin:     General: Skin is warm and dry.      Findings: Lesion and rash present.      Comments:   Scabbed vesicular lesions on left brow, forehead, and scalp  Diffuse neurofibromas    Neurological:      General: No focal deficit present.      Mental Status: She is alert and oriented to person, place, and time.   Psychiatric:         Mood and Affect: Mood normal.         Behavior: Behavior normal.         Significant Labs: All pertinent labs within the past 24 hours have been reviewed.    Significant Imaging: I have reviewed all pertinent imaging results/findings within the past 24 hours.    Assessment/Plan:      * Herpes zoster ophthalmicus of left eye  3-4 week history of vesicular lesions and  pain extending along the L V1 dermatome including the L eyelid. She reports L eye discharge and pain that impacts her vision. She was seen in the ED on 04/26 for this and followed outpatient by her PCP and ophthalmologist. Was taking Valtrex and using steroid eye drops, but the pain and rash persisted. Transferred here for inpatient ophthalmology evaluation. Vision impaired by discharge and presence of rash and neurofibromas on L eyelid, but otherwise no vision loss. Became febrile and tachycardic with new leukocytosis on 05/21.    - Ophthalmology consulted, recommended additional eye drop medications - prednisolone 4x/day, atropine, erythromycin, and artifical tears  - Benadryl and topical acyclovir for pruritis  - ID consulted with recs to continue IV acyclovir 10mg/kg q8h & transition to PO valtrex 1gm tid   - Gabapentin 300mg TID, Tylenol and oxy 5mg prn for pain  - F/u with ophthalmology in [place] within 2 weeks of discharge - discuss date and time with patient    Infection requiring contact isolation precautions  Pt tested positive for Herpes Zoster infection. Droplet-Contact precautions ordered. Treatment with IV Acyclovir commenced.   - Removed isolation precautions after all lesions crusted over and remained dry    Needs peripherally inserted central catheter (PICC)  PICC required for continued abx therapy upon discharge. Midline placed 05/25.    MSSA bacteremia  On 05/21, noted to have tachycardia, fever, and new leukocytosis. CXR without acute intracranial process. BCx growing GPCs, rapid ID showing MSSA. CT maxillofacial showing mild paranasal sinus disease with aerated secretions in L sphenoid sinus and innumerable cutaneous nodules consistent with neurofibromatosis. RUE US notable for superficial thrombophlebitis of R basilic and cephalic veins. TTE (05/24/24) showing normal EF and no valvular vegetations.    - Continue ancef, midline placed for outpatient IV abx  - ID following, recommending 4 weeks  of IV ancef following BCx clearance (05/23/24)  - Repeat BCx with NGTD    Elevated BP without diagnosis of hypertension  - Continue lisinopril to 20mg and coreg to 25mg BID  - Trend BP and labs    Anxiety  Continuing home ativan      Neurofibromatosis  Hx of neurofibromatosis      Asthma  Continue home inhaler        VTE Risk Mitigation (From admission, onward)           Ordered     enoxaparin injection 40 mg  Every 24 hours         05/23/24 1614     IP VTE LOW RISK PATIENT  Once         05/18/24 1505     Place sequential compression device  Until discontinued         05/18/24 1505                    Discharge Planning   AJ: 5/28/2024     Code Status: Full Code   Is the patient medically ready for discharge?:     Reason for patient still in hospital (select all that apply): Pending disposition  Discharge Plan A: Skilled Nursing Facility                  Duyen Joy MD  Department of Hospital Medicine   [facility] - Med Surg ([place]-[facility])

## 2024-05-29 NOTE — ASSESSMENT & PLAN NOTE
3-4 week history of vesicular lesions and pain extending along the L V1 dermatome including the L eyelid. She reports L eye discharge and pain that impacts her vision. She was seen in the ED on 04/26 for this and followed outpatient by her PCP and ophthalmologist. Was taking Valtrex and using steroid eye drops, but the pain and rash persisted. Transferred here for inpatient ophthalmology evaluation. Vision impaired by discharge and presence of rash and neurofibromas on L eyelid, but otherwise no vision loss. Became febrile and tachycardic with new leukocytosis on 05/21.    - Ophthalmology consulted, recommended additional eye drop medications - prednisolone 4x/day, atropine, erythromycin, and artifical tears  - Benadryl and topical acyclovir for pruritis  - ID consulted with recs to continue IV acyclovir 10mg/kg q8h & transition to PO valtrex 1gm tid   - Gabapentin 300mg TID, Tylenol and oxy 5mg prn for pain  - F/u with ophthalmology in [place] within 2 weeks of discharge - discuss date and time with patient

## 2024-05-29 NOTE — NURSING
Spoke with nurse at [facility] and notified of delay in transportation as well as new  time of 0800. Report to be called prior to pt leaving.

## 2024-05-29 NOTE — NURSING
Report given to Esthela at [facility], Patient no longer transporting by wheelchair van. She will be transported to [facility] by her sister.
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
## 2024-05-29 NOTE — SUBJECTIVE & OBJECTIVE
Interval History: NAEO. Pain improving, eyes more open. Still fells drowsy from gabapentin.    Review of Systems   Constitutional:  Positive for fatigue.   Eyes:  Positive for photophobia, pain, discharge and visual disturbance.   Respiratory:  Negative for cough and shortness of breath.    Cardiovascular:  Negative for chest pain.   Gastrointestinal:  Negative for abdominal pain, nausea and vomiting.   Skin:  Positive for rash.     Objective:     Vital Signs (Most Recent):  Temp: 98.7 °F (37.1 °C) (05/29/24 0450)  Pulse: 80 (05/29/24 0450)  Resp: 18 (05/29/24 0450)  BP: (!) 143/67 (05/29/24 0450)  SpO2: 97 % (05/29/24 0450) Vital Signs (24h Range):  Temp:  [98.2 °F (36.8 °C)-98.8 °F (37.1 °C)] 98.7 °F (37.1 °C)  Pulse:  [78-84] 80  Resp:  [17-18] 18  SpO2:  [92 %-97 %] 97 %  BP: (143-176)/(67-96) 143/67     Weight: 70.3 kg (155 lb)  Body mass index is 29.29 kg/m².    Intake/Output Summary (Last 24 hours) at 5/29/2024 0833  Last data filed at 5/29/2024 0627  Gross per 24 hour   Intake 2012.05 ml   Output --   Net 2012.05 ml         Physical Exam  Vitals and nursing note reviewed.   Constitutional:       General: She is not in acute distress.  HENT:      Head: Normocephalic and atraumatic.      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   Eyes:      Comments: More able to open eyes   Cardiovascular:      Rate and Rhythm: Normal rate and regular rhythm.   Pulmonary:      Effort: Pulmonary effort is normal. No respiratory distress.   Musculoskeletal:      Right lower leg: No edema.      Left lower leg: No edema.   Skin:     General: Skin is warm and dry.      Findings: Lesion and rash present.      Comments:   Scabbed vesicular lesions on left brow, forehead, and scalp  Diffuse neurofibromas    Neurological:      General: No  focal deficit present.      Mental Status: She is alert and oriented to person, place, and time.   Psychiatric:         Mood and Affect: Mood normal.         Behavior: Behavior normal.         Significant Labs: All pertinent labs within the past 24 hours have been reviewed.    Significant Imaging: I have reviewed all pertinent imaging results/findings within the past 24 hours.

## 2024-05-29 NOTE — PLAN OF CARE
[facility] - Med Surg ([facility])  Discharge Final Note    Primary Care Provider: Oscar Shafer MD    Expected Discharge Date: 5/28/2024    Final Discharge Note (most recent)       Final Note - 05/29/24 1555          Final Note    Assessment Type Final Discharge Note (P)      Anticipated Discharge Disposition Skilled Nursing Facility (P)      What phone number can be called within the next 1-3 days to see how you are doing after discharge? [phone] (P)         Post-Acute Status    Post-Acute Authorization Placement (P)      Post-Acute Placement Status Set-up Complete/Auth obtained (P)      Coverage [employer] MANAGED MEDICARE - [employer] MEDICARE HMO - (P)                      Important Message from Medicare  Important Message from Medicare regarding Discharge Appeal Rights: Given to patient/caregiver, Explained to patient/caregiver, Signed/date by patient/caregiver, Other (comments) (sister signed)     Date IMM was signed: 05/29/24  Time IMM was signed: 1215      Patient discharged to [facility] (Swingbed [facility].                      MARCELO Munoz, LMSW  [facility]  Case Management  Ext. [phone]

## 2024-05-29 NOTE — NURSING
Pt was to d/c this evening to [facility] in [place], [place] by [facility] with a  time of 1900. Transportation was delayed and new time of 2200 given. As of now pt is still waiting to d/c. Pt sister is at bedside and had plan on following pt to the facility in her own vehicle. As of now, there isn't an exact time that transportation will arrive and it is unsafe for the pt and her sister to travel during this time of night. Dr. Jaimes with med team 3 paged and notified of transportation issue. Ok to delay d/c until AM. New  time is for 0800. Pt and sister aware.

## 2024-05-29 NOTE — PLAN OF CARE
AAOX4. VS stable - hypertensive. PRN PO medication given for eye/nose/head pain. PRN IV medication given for nausea - one emesis event with undigested food. IV antibiotics given. Sister at bedside. Discharge to [facility] rescheduled until 8:00 AM due to delayed transportation to facility. No new complaints reported. No adverse events noted during this shift.    Problem: Adult Inpatient Plan of Care  Goal: Plan of Care Review  5/29/2024 0621 by Maximino Yan, RN  Outcome: Progressing  Flowsheets (Taken 5/29/2024 0621)  Plan of Care Reviewed With: patient  5/29/2024 0620 by Maximino Yan, RN  Outcome: Progressing  Goal: Patient-Specific Goal (Individualized)  Outcome: Progressing  Goal: Absence of Hospital-Acquired Illness or Injury  Outcome: Progressing  Intervention: Identify and Manage Fall Risk  Flowsheets (Taken 5/29/2024 0621)  Safety Promotion/Fall Prevention:   assistive device/personal item within reach   Fall Risk reviewed with patient/family   family to remain at bedside   medications reviewed   nonskid shoes/socks when out of bed   room near unit station   side rails raised x 2  Goal: Optimal Comfort and Wellbeing  Outcome: Progressing  Intervention: Monitor Pain and Promote Comfort  Flowsheets (Taken 5/29/2024 0621)  Pain Management Interventions:   care clustered   medication offered   pain management plan reviewed with patient/caregiver   pillow support provided   position adjusted   quiet environment facilitated   relaxation techniques promoted   warm blanket provided  Goal: Readiness for Transition of Care  Outcome: Progressing     Problem: Skin Injury Risk Increased  Goal: Skin Health and Integrity  Outcome: Progressing  Intervention: Optimize Skin Protection  Flowsheets (Taken 5/29/2024 0621)  Pressure Reduction Techniques: frequent weight shift encouraged  Activity Management: Ambulated to bathroom - L4     Problem: Infection  Goal: Absence of Infection Signs and Symptoms  Outcome: Progressing      Problem: Pain Acute  Goal: Optimal Pain Control and Function  Outcome: Progressing  Intervention: Optimize Psychosocial Wellbeing  Flowsheets (Taken 5/29/2024 3221)  Supportive Measures:   active listening utilized   verbalization of feelings encouraged   self-care encouraged   relaxation techniques promoted  Diversional Activities: television

## 2024-06-04 LAB — VIT C SERPL-MCNC: <1 MG/L (ref 2–19)

## 2024-07-23 ENCOUNTER — DOCUMENT SCAN (OUTPATIENT)
Dept: HOME HEALTH SERVICES | Facility: HOSPITAL | Age: 69
End: 2024-07-23
Payer: MEDICARE

## 2024-08-06 ENCOUNTER — EXTERNAL HOME HEALTH (OUTPATIENT)
Dept: HOME HEALTH SERVICES | Facility: HOSPITAL | Age: 69
End: 2024-08-06
Payer: MEDICARE

## 2024-08-26 ENCOUNTER — DOCUMENT SCAN (OUTPATIENT)
Dept: HOME HEALTH SERVICES | Facility: HOSPITAL | Age: 69
End: 2024-08-26
Payer: MEDICARE

## 2024-09-23 ENCOUNTER — EXTERNAL HOME HEALTH (OUTPATIENT)
Dept: HOME HEALTH SERVICES | Facility: HOSPITAL | Age: 69
End: 2024-09-23
Payer: MEDICARE

## 2024-09-26 ENCOUNTER — DOCUMENT SCAN (OUTPATIENT)
Dept: HOME HEALTH SERVICES | Facility: HOSPITAL | Age: 69
End: 2024-09-26
Payer: MEDICARE

## 2025-07-08 DIAGNOSIS — Q85.00 NEUROFIBROMATOSIS: Primary | ICD-10-CM

## 2025-07-09 ENCOUNTER — TELEPHONE (OUTPATIENT)
Dept: NEUROSURGERY | Facility: CLINIC | Age: 70
End: 2025-07-09
Payer: MEDICARE

## 2025-07-09 NOTE — TELEPHONE ENCOUNTER
----- Message from Lisa sent at 7/9/2025 10:00 AM CDT -----  Pt is requesting a callback at [phone]. Thank you.

## 2025-07-11 ENCOUNTER — DOCUMENTATION ONLY (OUTPATIENT)
Dept: NEUROSURGERY | Facility: HOSPITAL | Age: 70
End: 2025-07-11

## 2025-07-11 ENCOUNTER — TELEPHONE (OUTPATIENT)
Dept: NEUROSURGERY | Facility: CLINIC | Age: 70
End: 2025-07-11
Payer: MEDICARE

## 2025-07-11 NOTE — TELEPHONE ENCOUNTER
Attempted to call pt's daughter 2x regarding referral for NF per GÓMEZ Martinez. No answer so left VM with appt details and call back number for further questions.

## 2025-07-11 NOTE — PROGRESS NOTES
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  She is interested in having them removed.  Prior brain MRI WO (09/27/2024) revealed left paramedial cortical lesion.  Daughter-in-law reported they would like to see someone that can address the brain lesion as where as the back lesions.  She has previously been seen by dermatology who recommend she sees neurosurgery.  This office currently does not treat these types of lesions.  [facility] neurosurgery office was contacted.  Patient will be scheduled with Dr. Newman to assess brain and skin lesions.  MRI with contrast ordered and will be completed prior to appointment.

## 2025-07-15 DIAGNOSIS — Q85.00 NEUROFIBROMATOSIS: Primary | ICD-10-CM

## 2025-07-21 ENCOUNTER — HOSPITAL ENCOUNTER (OUTPATIENT)
Dept: RADIOLOGY | Facility: HOSPITAL | Age: 70
Discharge: HOME | End: 2025-07-21
Attending: HEALTH CARE PROVIDER
Payer: MEDICARE

## 2025-07-21 DIAGNOSIS — Q85.00 NEUROFIBROMATOSIS: ICD-10-CM

## 2025-07-21 DIAGNOSIS — G93.9 BRAIN LESION: ICD-10-CM

## 2025-07-21 PROCEDURE — 70553 MRI BRAIN STEM W/O & W/DYE: CPT | Mod: 26,,, | Performed by: RADIOLOGY

## 2025-07-21 PROCEDURE — 25500020 PHARM REV CODE 255

## 2025-07-21 PROCEDURE — 70553 MRI BRAIN STEM W/O & W/DYE: CPT | Mod: TC

## 2025-07-21 PROCEDURE — A9585 GADOBUTROL INJECTION: HCPCS

## 2025-07-21 RX ORDER — GADOBUTROL 604.72 MG/ML
INJECTION INTRAVENOUS
Status: COMPLETED
Start: 2025-07-21 | End: 2025-07-21

## 2025-07-21 RX ADMIN — GADOBUTROL 7 ML: 604.72 INJECTION INTRAVENOUS at 07:07

## 2025-08-13 ENCOUNTER — OFFICE VISIT (OUTPATIENT)
Dept: NEUROSURGERY | Facility: CLINIC | Age: 70
End: 2025-08-13
Payer: MEDICARE

## 2025-08-13 VITALS — DIASTOLIC BLOOD PRESSURE: 79 MMHG | SYSTOLIC BLOOD PRESSURE: 147 MMHG | HEART RATE: 97 BPM

## 2025-08-13 DIAGNOSIS — Q85.00 NEUROFIBROMATOSIS: ICD-10-CM

## 2025-08-13 PROCEDURE — 3288F FALL RISK ASSESSMENT DOCD: CPT | Mod: CPTII,S$GLB,, | Performed by: NEUROLOGICAL SURGERY

## 2025-08-13 PROCEDURE — 1101F PT FALLS ASSESS-DOCD LE1/YR: CPT | Mod: CPTII,S$GLB,, | Performed by: NEUROLOGICAL SURGERY

## 2025-08-13 PROCEDURE — 1125F AMNT PAIN NOTED PAIN PRSNT: CPT | Mod: CPTII,S$GLB,, | Performed by: NEUROLOGICAL SURGERY

## 2025-08-13 PROCEDURE — 99215 OFFICE O/P EST HI 40 MIN: CPT | Mod: S$GLB,,, | Performed by: NEUROLOGICAL SURGERY

## 2025-08-13 PROCEDURE — 99999 PR PBB SHADOW E&M-EST. PATIENT-LVL IV: CPT | Mod: PBBFAC,,, | Performed by: NEUROLOGICAL SURGERY

## 2025-08-13 PROCEDURE — 1160F RVW MEDS BY RX/DR IN RCRD: CPT | Mod: CPTII,S$GLB,, | Performed by: NEUROLOGICAL SURGERY

## 2025-08-13 PROCEDURE — 4010F ACE/ARB THERAPY RXD/TAKEN: CPT | Mod: CPTII,S$GLB,, | Performed by: NEUROLOGICAL SURGERY

## 2025-08-13 PROCEDURE — 1159F MED LIST DOCD IN RCRD: CPT | Mod: CPTII,S$GLB,, | Performed by: NEUROLOGICAL SURGERY

## 2025-08-13 PROCEDURE — 3077F SYST BP >= 140 MM HG: CPT | Mod: CPTII,S$GLB,, | Performed by: NEUROLOGICAL SURGERY

## 2025-08-13 PROCEDURE — 3078F DIAST BP <80 MM HG: CPT | Mod: CPTII,S$GLB,, | Performed by: NEUROLOGICAL SURGERY
